# Patient Record
Sex: MALE | Race: WHITE | Employment: OTHER | URBAN - NONMETROPOLITAN AREA
[De-identification: names, ages, dates, MRNs, and addresses within clinical notes are randomized per-mention and may not be internally consistent; named-entity substitution may affect disease eponyms.]

---

## 2021-04-28 ENCOUNTER — TELEPHONE (OUTPATIENT)
Dept: VASCULAR SURGERY | Age: 76
End: 2021-04-28

## 2021-05-10 RX ORDER — SODIUM CHLORIDE FOR INHALATION 10 %
VIAL, NEBULIZER (ML) INHALATION
COMMUNITY
End: 2021-06-09 | Stop reason: CLARIF

## 2021-05-10 RX ORDER — PAROXETINE HYDROCHLORIDE 20 MG/1
20 TABLET, FILM COATED ORAL EVERY MORNING
COMMUNITY

## 2021-05-13 ENCOUNTER — OFFICE VISIT (OUTPATIENT)
Dept: VASCULAR SURGERY | Age: 76
End: 2021-05-13
Payer: MEDICARE

## 2021-05-13 ENCOUNTER — HOSPITAL ENCOUNTER (OUTPATIENT)
Dept: NON INVASIVE DIAGNOSTICS | Age: 76
Discharge: HOME | End: 2021-05-13
Payer: MEDICARE

## 2021-05-13 VITALS
HEART RATE: 108 BPM | SYSTOLIC BLOOD PRESSURE: 82 MMHG | HEIGHT: 67 IN | TEMPERATURE: 98.3 F | BODY MASS INDEX: 21.97 KG/M2 | WEIGHT: 140 LBS | OXYGEN SATURATION: 95 % | DIASTOLIC BLOOD PRESSURE: 54 MMHG | RESPIRATION RATE: 16 BRPM

## 2021-05-13 DIAGNOSIS — I73.9 CLAUDICATION (HCC): ICD-10-CM

## 2021-05-13 DIAGNOSIS — N17.9 ACUTE RENAL FAILURE, UNSPECIFIED ACUTE RENAL FAILURE TYPE (HCC): ICD-10-CM

## 2021-05-13 DIAGNOSIS — I65.23 BILATERAL CAROTID ARTERY STENOSIS: Primary | ICD-10-CM

## 2021-05-13 LAB
ANION GAP SERPL CALCULATED.3IONS-SCNC: 9 MMOL/L (ref 7–19)
BUN BLDV-MCNC: 26 MG/DL (ref 8–23)
CALCIUM SERPL-MCNC: 9.7 MG/DL (ref 8.8–10.2)
CHLORIDE BLD-SCNC: 98 MMOL/L (ref 98–111)
CO2: 28 MMOL/L (ref 22–29)
CREAT SERPL-MCNC: 1.3 MG/DL (ref 0.5–1.2)
GFR AFRICAN AMERICAN: >59
GFR NON-AFRICAN AMERICAN: 54
GLUCOSE BLD-MCNC: 91 MG/DL (ref 74–109)
POTASSIUM SERPL-SCNC: 4.1 MMOL/L (ref 3.5–5)
SODIUM BLD-SCNC: 135 MMOL/L (ref 136–145)

## 2021-05-13 PROCEDURE — 99204 OFFICE O/P NEW MOD 45 MIN: CPT | Performed by: NURSE PRACTITIONER

## 2021-05-13 PROCEDURE — 4004F PT TOBACCO SCREEN RCVD TLK: CPT | Performed by: NURSE PRACTITIONER

## 2021-05-13 PROCEDURE — 93923 UPR/LXTR ART STDY 3+ LVLS: CPT

## 2021-05-13 PROCEDURE — 3017F COLORECTAL CA SCREEN DOC REV: CPT | Performed by: NURSE PRACTITIONER

## 2021-05-13 PROCEDURE — G8420 CALC BMI NORM PARAMETERS: HCPCS | Performed by: NURSE PRACTITIONER

## 2021-05-13 PROCEDURE — 1123F ACP DISCUSS/DSCN MKR DOCD: CPT | Performed by: NURSE PRACTITIONER

## 2021-05-13 PROCEDURE — G8427 DOCREV CUR MEDS BY ELIG CLIN: HCPCS | Performed by: NURSE PRACTITIONER

## 2021-05-13 PROCEDURE — 4040F PNEUMOC VAC/ADMIN/RCVD: CPT | Performed by: NURSE PRACTITIONER

## 2021-05-13 RX ORDER — LISINOPRIL 40 MG/1
40 TABLET ORAL DAILY
COMMUNITY

## 2021-05-13 NOTE — PROGRESS NOTES
Millie Chirinos (:  1945) is a 70 y.o. male,New patient, here for evaluation of the following chief complaint(s):  New Patient [facility]; Carotid stenosis)            SUBJECTIVE/OBJECTIVE:  He presents for follow up of carotid artery stenosis. He was recently hospitalized. He was admitted with multiple episodes of syncope. He had sob and reports his eyes would dim as he was about to pass out. He had no lateralizing events. On admission he was in ARF he reports due to hydration. He states all of that is better but he is still having near syncopal spells. He reports he had cardiac monitoring while there and was sent her due to critical carotid. His current treatment includes none. He denies a history of CVA. He reports has not had TIA's, episodes of lateralizing weakness and episodes of amaurosis fugax. He also reports claudication at a short distance, about 100 foot. Right leg is more significant than the left. He has pain in his right foot at rest on occasion. Millie Chirinos is a 70 y.o. male [facility] the [facility] There are no active problems to display for this patient. Current Outpatient Medications   Medication Sig Dispense Refill    lisinopril (PRINIVIL;ZESTRIL) 40 MG tablet Take 40 mg by mouth daily      HYDROCHLOROTHIAZIDE PO Take by mouth      PARoxetine (PAXIL) 20 MG tablet Take 20 mg by mouth every morning      Meperidine HCl-Sodium Chloride 1000-0.9 MG/100ML-% SOLN Infuse intravenously.  Sodium Chloride 10 % NEBU Inhale into the lungs       No current facility-administered medications for this visit. Allergies:  Other and Sulfa antibiotics  Past Medical History:   Diagnosis Date    [facility]cere[facility] [address] [address]     Depression     Hypertension      Past Surgical History:   Procedure Laterality Date    CARPAL TUNNEL RELEASE Bilateral 2000    KNEE SURGERY       Family History   Problem Relation Age of Onset    function      Reviewed lfts and these are normal        Reviewed on this visit: pcp notes and recent hospitalization notes from last month         ASSESSMENT/PLAN:  1. Bilateral carotid artery stenosis  -     CTA NECK W CONTRAST; Future  -     CTA HEAD W WO CONTRAST; Future  2. Claudication ([address]  -     VL LOWER EXTREMITY ARTERIAL SEGMENTAL PRESSURES W PPG; Future  3. Acute renal failure, unspecified acute renal failure type ([address]  -     Basic Metabolic Panel        Discussed management of carotid u/s which includes:  start asa to reduce risk of TIA/CVA, to reduce risk of arterial thrombosis and to decrease rate of plaque buildup  Strongly encourage start/continue statin therapy - will need fasting lipid  Recommend no smoking - discussed the effect tobacco has on illness;   Proceed with Lower extremity arterial study: Right RON 0.39, Left RON 0.55. Individual films reviewed: Yes. These results were reviewed with the patient. Disease process is acute/chronic illness that poses a threat to life or bodily function    Bmp today - creatinine now 1.3. Discussed with Dr. Anthony Murrieta. He needs cta head and neck. We will prep with mucomyst and hydrate to try and decrease risk to kidneys. Then we can address whether CE is indicated. He will need intervention to his legs, especially right when this is complete         Patient instructed to call or proceed to the emergency room with any symptoms of lateralizing weakness, loss of vision in one eye, or episodes slurred speech. An electronic signature was used to authenticate this note.     --[facility], ROLANDO

## 2021-05-13 NOTE — Clinical Note
Please call patient. His blood flow to his feet is very poor. His labs are greatly improved. Per dr. Dianne Ken. We need to know more about the carotids because he is still passing out. The next step is a ct scan. We have to use dye which must pass through the kidneys. We can prep with mucomyst and hydrate as well as possible to reduce risk of injury to kidneys. We will then see if the carotids need to be cleaned out. We will deal with his legs after this.

## 2021-05-14 ENCOUNTER — TELEPHONE (OUTPATIENT)
Dept: VASCULAR SURGERY | Age: 76
End: 2021-05-14

## 2021-05-14 NOTE — TELEPHONE ENCOUNTER
Spoke with patient him know we have him scheduled for 5/25/21 for CTA with pre and post fluids. He will  mucomyst from [facility] next week and take as instructed. Patient voiced understanding is aware.

## 2021-05-25 ENCOUNTER — HOSPITAL ENCOUNTER (OUTPATIENT)
Dept: CT IMAGING | Age: 76
Discharge: HOME | End: 2021-05-25
Payer: MEDICARE

## 2021-05-25 DIAGNOSIS — I65.23 BILATERAL CAROTID ARTERY STENOSIS: ICD-10-CM

## 2021-05-25 PROCEDURE — 70496 CT ANGIOGRAPHY HEAD: CPT

## 2021-05-25 PROCEDURE — 70498 CT ANGIOGRAPHY NECK: CPT

## 2021-05-25 PROCEDURE — 6360000004 HC RX CONTRAST MEDICATION: Performed by: NURSE PRACTITIONER

## 2021-05-25 PROCEDURE — 2500000003 HC RX 250 WO HCPCS: Performed by: NURSE PRACTITIONER

## 2021-05-25 PROCEDURE — 2580000003 HC RX 258: Performed by: NURSE PRACTITIONER

## 2021-05-25 RX ORDER — SODIUM CHLORIDE 0.9 % (FLUSH) 0.9 %
5-40 SYRINGE (ML) INJECTION EVERY 12 HOURS SCHEDULED
Status: DISCONTINUED | OUTPATIENT
Start: 2021-05-25 | End: 2021-05-27 | Stop reason: HOSPADM

## 2021-05-25 RX ORDER — SODIUM CHLORIDE 0.9 % (FLUSH) 0.9 %
5-40 SYRINGE (ML) INJECTION PRN
Status: DISCONTINUED | OUTPATIENT
Start: 2021-05-25 | End: 2021-05-27 | Stop reason: HOSPADM

## 2021-05-25 RX ORDER — SODIUM CHLORIDE 9 MG/ML
25 INJECTION, SOLUTION INTRAVENOUS PRN
Status: DISCONTINUED | OUTPATIENT
Start: 2021-05-25 | End: 2021-05-27 | Stop reason: HOSPADM

## 2021-05-25 RX ADMIN — SODIUM BICARBONATE: 84 INJECTION, SOLUTION INTRAVENOUS at 13:30

## 2021-05-25 RX ADMIN — SODIUM CHLORIDE, PRESERVATIVE FREE 10 ML: 5 INJECTION INTRAVENOUS at 08:10

## 2021-05-25 RX ADMIN — IOPAMIDOL 90 ML: 755 INJECTION, SOLUTION INTRAVENOUS at 12:55

## 2021-05-25 RX ADMIN — SODIUM BICARBONATE: 84 INJECTION, SOLUTION INTRAVENOUS at 08:40

## 2021-05-25 RX ADMIN — SODIUM CHLORIDE, PRESERVATIVE FREE 10 ML: 5 INJECTION INTRAVENOUS at 08:40

## 2021-05-28 ENCOUNTER — VIRTUAL VISIT (OUTPATIENT)
Dept: VASCULAR SURGERY | Age: 76
End: 2021-05-28
Payer: MEDICARE

## 2021-05-28 DIAGNOSIS — I65.23 BILATERAL CAROTID ARTERY STENOSIS: Primary | ICD-10-CM

## 2021-05-28 PROCEDURE — 99442 PR PHYS/QHP TELEPHONE EVALUATION 11-20 MIN: CPT | Performed by: NURSE PRACTITIONER

## 2021-05-28 RX ORDER — CLOPIDOGREL BISULFATE 75 MG/1
75 TABLET ORAL DAILY
Qty: 30 TABLET | Refills: 3 | Status: ON HOLD | OUTPATIENT
Start: 2021-05-28 | End: 2021-07-30 | Stop reason: ALTCHOICE

## 2021-05-28 RX ORDER — ASPIRIN 81 MG/1
81 TABLET ORAL DAILY
COMMUNITY

## 2021-05-28 NOTE — PROGRESS NOTES
the patient. Disease process is acute/chronic illness that poses a threat to life or bodily function       Options have been discussed with the patient including continued medical management vs. proceeding with right CE. Patient has opted to proceed with this. Risks have been discussed with the patient including but not limited to MI, death, CVA, bleed, nerve injury, and infection. Assessment    1. Bilateral carotid artery stenosis          Plan    1. Bilateral carotid artery stenosis      Proceed with right ce  Offered next week, he prefers to wait  Start plavix 75 m po daily  Will get fasting lipid on arrival  Strongly encouraged start/continue statin therapy  Recommended no smoking        Documentation:  I communicated with the patient and/or health care decision maker about cvd. Details of this discussion including any medical advice provided: as above      I affirm this is a Patient Initiated Episode with a Patient who has not had a related appointment within my department in the past 7 days or scheduled within the next 24 hours. Patient identification was verified at the start of the visit: Yes    Total Time: minutes: 11-20 minutes    The visit was conducted pursuant to the emergency declaration under the [address] [place] [address], [address] [place] [address] authority and the [employer] Act. Patient identification was verified, and a caregiver was present when appropriate. The patient was located in a state where the provider was credentialed to provide care.     Note: not billable if this call serves to triage the patient into an appointment for the relevant concern      ROLANDO Valle

## 2021-06-01 ENCOUNTER — TELEPHONE (OUTPATIENT)
Dept: VASCULAR SURGERY | Age: 76
End: 2021-06-01

## 2021-06-01 DIAGNOSIS — Z01.818 PRE-OP TESTING: Primary | ICD-10-CM

## 2021-06-01 NOTE — TELEPHONE ENCOUNTER
bottle of Hibiclens. Wash thoroughly with this the night before and the morning of the procedure, paying special attention to the area that will be operated on. Make sure you rinse very well. The Hibiclens should only be used prior to surgery. 15. You will need to use Bactroban Ointment prior to your procedure. You will need to apply the Bactroban Ointment to the inside of your nose on both sides twice a day for 5 days starting on Wed. 6/9/2021. The Bactroban Ointment has been sent to [facility]. 16. Please register at the [facility] Patient Registration on Wed. 6/9/2021 at 1:00 pm for pre-op testing. Please make sure to bring your covid vaccination card with you to this appointment. You will not have to fast prior. 16. New policy requires that anyone who comes into the hospital will be required to wear a face mask. A cloth mask is acceptable. 18. To ensure the health and safety of our patients and staff, [facility] has implemented visitor restrictions. Only one person will be allowed to accompany you for your procedure. If you or your visitor are exhibiting signs & symptoms of illness such as fever, cough, sore throat or body aches, we ask that you reschedule your procedure to a later date after your symptoms have been resolved. 19. Other Directions: If you have any questions or concerns please contact our office at [phone] and ask for Lizbet Kaufman. Unless instructed otherwise by your physician, cleanse incision/puncture site twice daily with soap and water. Apply dry gauze. Do not get in tub. Okay to shower. Do not apply any salve, cream, peroxide or alcohol to the incision. Call with any increasing redness or drainage. PLEASE NOTE:  If the patient is unable to sign his/her own paperwork, the appointed caregiver (POA, child, sibling, etc) must be present at the time of registration for all testing and procedures.     Transportation to and from all testing and procedure appointments is the sole responsibility of the patient, caregiver, and/or nursing facility in which they reside. Please remember you will not be able to drive after you are discharged. Please call the office at [phone] with any questions or concerns. Please allow 48-72 hours notice for cancellations or rescheduling. We will attempt to accommodate your needs to the best of our capabilities, however, strict policies with procedure room availability does not allow much flexibility.

## 2021-06-02 RX ORDER — ASPIRIN 81 MG/1
81 TABLET ORAL ONCE
Status: CANCELLED | OUTPATIENT
Start: 2021-06-02 | End: 2021-06-02

## 2021-06-02 RX ORDER — SODIUM CHLORIDE 0.9 % (FLUSH) 0.9 %
10 SYRINGE (ML) INJECTION EVERY 12 HOURS SCHEDULED
Status: CANCELLED | OUTPATIENT
Start: 2021-06-02

## 2021-06-02 RX ORDER — SODIUM CHLORIDE 0.9 % (FLUSH) 0.9 %
10 SYRINGE (ML) INJECTION PRN
Status: CANCELLED | OUTPATIENT
Start: 2021-06-02

## 2021-06-02 RX ORDER — SODIUM CHLORIDE 9 MG/ML
25 INJECTION, SOLUTION INTRAVENOUS PRN
Status: CANCELLED | OUTPATIENT
Start: 2021-06-02

## 2021-06-02 NOTE — H&P (VIEW-ONLY)
Mary Tyler is a 70 y.o. male evaluated via telephone on 2021. Mary Tyler (:  1945) is a 70 y.o. male,Established patient, here for evaluation of the following chief complaint(s):     Consent:  He and/or health care decision maker is aware that that he may receive a bill for this telephone service, depending on his insurance coverage, and has provided verbal consent to proceed: Yes    Patient is located at home  [facility]   Also present during call is no rosa    Patient was recently hospitalized with multiple episodes of syncope. He continued to have spells for 2 weeks after leaving hospital but none since. He gets weak all over when these occur. His vision would dim as he would pass out. He had a complete workup and cardiac monitoring. He was found to have a critical carotid stenosis. He has had no amaurosis or lateralizing weakness. Mary Tyler is a 70 y.o. male with the following history reviewed and recorded in [employer]: There are no problems to display for this patient. Current Outpatient Medications   Medication Sig Dispense Refill    aspirin 81 MG EC tablet Take 81 mg by mouth daily      clopidogrel (PLAVIX) 75 MG tablet Take 1 tablet by mouth daily 30 tablet 3    mupirocin (BACTROBAN) 2 % ointment Apply to nares BID x 5 days starting on 21 3 g 0    lisinopril (PRINIVIL;ZESTRIL) 40 MG tablet Take 40 mg by mouth daily      HYDROCHLOROTHIAZIDE PO Take by mouth      Meperidine HCl-Sodium Chloride 1000-0.9 MG/100ML-% SOLN Infuse intravenously.  PARoxetine (PAXIL) 20 MG tablet Take 20 mg by mouth every morning      Sodium Chloride 10 % NEBU Inhale into the lungs       No current facility-administered medications for this visit. Allergies:  Other and Sulfa antibiotics  Past Medical History:   Diagnosis Date    [facility]cere[facility] [address]     Depression     Hypertension      Past Surgical History:   Procedure Laterality the patient. Disease process is acute/chronic illness that poses a threat to life or bodily function       Options have been discussed with the patient including continued medical management vs. proceeding with right CE. Patient has opted to proceed with this. Risks have been discussed with the patient including but not limited to MI, death, CVA, bleed, nerve injury, and infection. Assessment    1. Bilateral carotid artery stenosis          Plan    1.  Bilateral carotid artery stenosis      Proceed with right ce  Offered next week, he prefers to wait  Start plavix 75 m po daily  Will get fasting lipid on arrival  Strongly encouraged start/continue statin therapy  Recommended no smoking

## 2021-06-02 NOTE — H&P
Johana Lopez is a 70 y.o. male evaluated via telephone on 2021. Johana Lopez (:  1945) is a 70 y.o. male,Established patient, here for evaluation of the following chief complaint(s):     Consent:  He and/or health care decision maker is aware that that he may receive a bill for this telephone service, depending on his insurance coverage, and has provided verbal consent to proceed: Yes    Patient is located at home  Provider is located at [facility]   Also present during call is no rosa    Patient was recently hospitalized with multiple episodes of syncope. He continued to have spells for 2 weeks after leaving hospital but none since. He gets weak all over when these occur. His vision would dim as he would pass out. He had a complete workup and cardiac monitoring. He was found to have a critical carotid stenosis. He has had no amaurosis or lateralizing weakness. Johana Lopez is a 70 y.o. male with the following history reviewed and recorded in [employer]: There are no problems to display for this patient. Current Outpatient Medications   Medication Sig Dispense Refill    aspirin 81 MG EC tablet Take 81 mg by mouth daily      clopidogrel (PLAVIX) 75 MG tablet Take 1 tablet by mouth daily 30 tablet 3    mupirocin (BACTROBAN) 2 % ointment Apply to nares BID x 5 days starting on 21 3 g 0    lisinopril (PRINIVIL;ZESTRIL) 40 MG tablet Take 40 mg by mouth daily      HYDROCHLOROTHIAZIDE PO Take by mouth      Meperidine HCl-Sodium Chloride 1000-0.9 MG/100ML-% SOLN Infuse intravenously.  PARoxetine (PAXIL) 20 MG tablet Take 20 mg by mouth every morning      Sodium Chloride 10 % NEBU Inhale into the lungs       No current facility-administered medications for this visit. Allergies:  Other and Sulfa antibiotics  Past Medical History:   Diagnosis Date    [facility]cere[facility]) [facility] [address]     Depression     Hypertension      Past Surgical History:   Procedure Laterality Date    CARPAL TUNNEL RELEASE Bilateral 2000    KNEE SURGERY  1996     Family History   Problem Relation Age of Onset    Breast Cancer Mother      Social History     Tobacco Use    Smoking status: Current Every Day Smoker     Types: Cigars    Smokeless tobacco: Never Used    Tobacco comment: 1 cigar per day    Substance Use Topics    Alcohol use: Yes     Alcohol/week: 0.0 - 2.0 standard drinks       No flowsheet data found. Review of Systems      Eyes  no sudden vision change or amaurosis. Respiratory  no significant shortness of breath, wheezing, or stridor. No cough,  Cardiovascular  no chest pain, syncope, or significant dizziness. No significant leg swelling.  has not had claudication. Skin   has not had new wound. Neurologic   No speech difficulty or lateralizing weakness. Psychiatric  no severe anxiety or nervousness. No confusion. All other review of systems are negative. PHYSICAL EXAMINATION:    [ INSTRUCTIONS:  \"[x]\" Indicates a positive item  \"[]\" Indicates a negative item  -- DELETE ALL ITEMS NOT EXAMINED]    [x] Alert  [x] Oriented to person/place/time    [x] No apparent distress  [] Toxic appearing  [x] Normal Mood  [] Anxious appearing    [] Depressed appearing  [] Confused appearing      [] Poor short term memory  [] Poor long term memory   Memory appears to be intact       cta -  1.  80% narrowing of the RIGHT internal carotid artery origin and 60%   narrowing of the LEFT internal carotid artery origin secondary to   heavy atherosclerotic plaque and calcification. 2.  Only faint opacification of the LEFT vertebral artery with   appearance of occlusion distally near foramen magnum. 3.  Severe multilevel cervical spine degenerative change     Doppler results:    Right CCA/ICA 70-99% stenotic  Left CCA/ICA 50-69% stenotic  Right verterbral artery flow is antegrade  Left verterbral artery flow is antegrade  Individual velocities reviewed: Yes.   Results were reviewed with

## 2021-06-03 ENCOUNTER — TELEPHONE (OUTPATIENT)
Dept: NEUROSURGERY | Age: 76
End: 2021-06-03

## 2021-06-04 ENCOUNTER — TELEPHONE (OUTPATIENT)
Dept: VASCULAR SURGERY | Age: 76
End: 2021-06-04

## 2021-06-04 DIAGNOSIS — Z01.818 PRE-OP TESTING: Primary | ICD-10-CM

## 2021-06-04 NOTE — TELEPHONE ENCOUNTER
I left a vm letting the pt know he will meet with Dr. Remedios Schwartz the morning of his carotid surgery. I have also offered the pt an ov in Dr. Remedios Schwartz' clinic this coming Argalo Sanchez if he would prefer a sit down. When the pt and I had discussed this previously he did not want the ov scheduled if he would have the chance to meet with him the morning of. I asked for a callback if the pt has changed his mind about the ov.

## 2021-06-07 ENCOUNTER — TELEPHONE (OUTPATIENT)
Dept: NEUROSURGERY | Age: 76
End: 2021-06-07

## 2021-06-07 NOTE — TELEPHONE ENCOUNTER
2nd attempt to call patient to schedule appointment, left voicemail with call back number [phone]

## 2021-06-09 ENCOUNTER — HOSPITAL ENCOUNTER (OUTPATIENT)
Dept: GENERAL RADIOLOGY | Age: 76
Discharge: HOME | End: 2021-06-09
Payer: MEDICARE

## 2021-06-09 ENCOUNTER — HOSPITAL ENCOUNTER (OUTPATIENT)
Dept: PREADMISSION TESTING | Age: 76
Discharge: HOME | End: 2021-06-13
Payer: MEDICARE

## 2021-06-09 VITALS — HEIGHT: 67 IN | BODY MASS INDEX: 22.44 KG/M2 | WEIGHT: 143 LBS

## 2021-06-09 DIAGNOSIS — Z01.818 PRE-OP TESTING: ICD-10-CM

## 2021-06-09 LAB
ABO/RH: NORMAL
ANION GAP SERPL CALCULATED.3IONS-SCNC: 8 MMOL/L (ref 7–19)
ANTIBODY SCREEN: NORMAL
APTT: 27.2 SEC (ref 26–36.2)
BUN BLDV-MCNC: 17 MG/DL (ref 8–23)
CALCIUM SERPL-MCNC: 9.9 MG/DL (ref 8.8–10.2)
CHLORIDE BLD-SCNC: 102 MMOL/L (ref 98–111)
CO2: 29 MMOL/L (ref 22–29)
CREAT SERPL-MCNC: 1.1 MG/DL (ref 0.5–1.2)
GFR AFRICAN AMERICAN: >59
GFR NON-AFRICAN AMERICAN: >60
GLUCOSE BLD-MCNC: 92 MG/DL (ref 74–109)
HCT VFR BLD CALC: 40.7 % (ref 42–52)
HEMOGLOBIN: 14.1 G/DL (ref 14–18)
INR BLD: 0.99 (ref 0.88–1.18)
MCH RBC QN AUTO: 32.5 PG (ref 27–31)
MCHC RBC AUTO-ENTMCNC: 34.6 G/DL (ref 33–37)
MCV RBC AUTO: 93.8 FL (ref 80–94)
MRSA SCREEN RT-PCR: NOT DETECTED
PDW BLD-RTO: 13.4 % (ref 11.5–14.5)
PLATELET # BLD: 376 K/UL (ref 130–400)
PMV BLD AUTO: 9.7 FL (ref 9.4–12.4)
POTASSIUM SERPL-SCNC: 4.5 MMOL/L (ref 3.5–5)
PROTHROMBIN TIME: 13 SEC (ref 12–14.6)
RBC # BLD: 4.34 M/UL (ref 4.7–6.1)
SODIUM BLD-SCNC: 139 MMOL/L (ref 136–145)
WBC # BLD: 7.7 K/UL (ref 4.8–10.8)

## 2021-06-09 PROCEDURE — 80048 BASIC METABOLIC PNL TOTAL CA: CPT

## 2021-06-09 PROCEDURE — 85730 THROMBOPLASTIN TIME PARTIAL: CPT

## 2021-06-09 PROCEDURE — 86900 BLOOD TYPING SEROLOGIC ABO: CPT

## 2021-06-09 PROCEDURE — 93005 ELECTROCARDIOGRAM TRACING: CPT

## 2021-06-09 PROCEDURE — 85027 COMPLETE CBC AUTOMATED: CPT

## 2021-06-09 PROCEDURE — 86850 RBC ANTIBODY SCREEN: CPT

## 2021-06-09 PROCEDURE — 87641 MR-STAPH DNA AMP PROBE: CPT

## 2021-06-09 PROCEDURE — 86901 BLOOD TYPING SEROLOGIC RH(D): CPT

## 2021-06-09 PROCEDURE — 71046 X-RAY EXAM CHEST 2 VIEWS: CPT

## 2021-06-09 PROCEDURE — 85610 PROTHROMBIN TIME: CPT

## 2021-06-10 LAB
EKG P AXIS: 7 DEGREES
EKG P-R INTERVAL: 166 MS
EKG Q-T INTERVAL: 386 MS
EKG QRS DURATION: 82 MS
EKG QTC CALCULATION (BAZETT): 415 MS
EKG T AXIS: 68 DEGREES

## 2021-06-10 PROCEDURE — 93010 ELECTROCARDIOGRAM REPORT: CPT | Performed by: INTERNAL MEDICINE

## 2021-06-15 ENCOUNTER — ANESTHESIA (OUTPATIENT)
Dept: OPERATING ROOM | Age: 76
DRG: 038 | End: 2021-06-15
Payer: MEDICARE

## 2021-06-15 ENCOUNTER — HOSPITAL ENCOUNTER (INPATIENT)
Age: 76
LOS: 2 days | Discharge: HOME | DRG: 038 | End: 2021-06-17
Attending: SURGERY | Admitting: SURGERY
Payer: MEDICARE

## 2021-06-15 ENCOUNTER — APPOINTMENT (OUTPATIENT)
Dept: INTERVENTIONAL RADIOLOGY/VASCULAR | Age: 76
DRG: 038 | End: 2021-06-15
Attending: SURGERY
Payer: MEDICARE

## 2021-06-15 ENCOUNTER — ANESTHESIA EVENT (OUTPATIENT)
Dept: OPERATING ROOM | Age: 76
DRG: 038 | End: 2021-06-15
Payer: MEDICARE

## 2021-06-15 VITALS — DIASTOLIC BLOOD PRESSURE: 83 MMHG | TEMPERATURE: 99.3 F | SYSTOLIC BLOOD PRESSURE: 123 MMHG | OXYGEN SATURATION: 100 %

## 2021-06-15 PROBLEM — I65.21 ASYMPTOMATIC STENOSIS OF RIGHT CAROTID ARTERY: Status: ACTIVE | Noted: 2021-06-15

## 2021-06-15 LAB
ABO/RH: NORMAL
ANTIBODY SCREEN: NORMAL
CHOLESTEROL, TOTAL: 212 MG/DL (ref 160–199)
D DIMER: 1.25 UG/ML FEU (ref 0–0.48)
EKG P AXIS: 67 DEGREES
EKG P-R INTERVAL: 172 MS
EKG Q-T INTERVAL: 332 MS
EKG QRS DURATION: 88 MS
EKG QTC CALCULATION (BAZETT): 407 MS
EKG T AXIS: 122 DEGREES
HDLC SERPL-MCNC: 41 MG/DL (ref 55–121)
LDL CHOLESTEROL CALCULATED: 145 MG/DL
LV EF: 58 %
LVEF MODALITY: NORMAL
POTASSIUM SERPL-SCNC: 4.3 MMOL/L (ref 3.5–5)
TRIGL SERPL-MCNC: 128 MG/DL (ref 0–149)
TROPONIN: <0.01 NG/ML (ref 0–0.03)

## 2021-06-15 PROCEDURE — C1768 GRAFT, VASCULAR: HCPCS | Performed by: SURGERY

## 2021-06-15 PROCEDURE — 03CK0ZZ EXTIRPATION OF MATTER FROM RIGHT INTERNAL CAROTID ARTERY, OPEN APPROACH: ICD-10-PCS | Performed by: SURGERY

## 2021-06-15 PROCEDURE — 2500000003 HC RX 250 WO HCPCS: Performed by: NURSE ANESTHETIST, CERTIFIED REGISTERED

## 2021-06-15 PROCEDURE — 7100000001 HC PACU RECOVERY - ADDTL 15 MIN: Performed by: SURGERY

## 2021-06-15 PROCEDURE — 6360000002 HC RX W HCPCS: Performed by: SURGERY

## 2021-06-15 PROCEDURE — 3600000015 HC SURGERY LEVEL 5 ADDTL 15MIN: Performed by: SURGERY

## 2021-06-15 PROCEDURE — 2709999900 HC NON-CHARGEABLE SUPPLY: Performed by: SURGERY

## 2021-06-15 PROCEDURE — 3700000000 HC ANESTHESIA ATTENDED CARE: Performed by: SURGERY

## 2021-06-15 PROCEDURE — 93010 ELECTROCARDIOGRAM REPORT: CPT | Performed by: INTERNAL MEDICINE

## 2021-06-15 PROCEDURE — 2580000003 HC RX 258: Performed by: ANESTHESIOLOGY

## 2021-06-15 PROCEDURE — 7100000000 HC PACU RECOVERY - FIRST 15 MIN: Performed by: SURGERY

## 2021-06-15 PROCEDURE — 84132 ASSAY OF SERUM POTASSIUM: CPT

## 2021-06-15 PROCEDURE — 86900 BLOOD TYPING SEROLOGIC ABO: CPT

## 2021-06-15 PROCEDURE — 93005 ELECTROCARDIOGRAM TRACING: CPT | Performed by: ANESTHESIOLOGY

## 2021-06-15 PROCEDURE — 93306 TTE W/DOPPLER COMPLETE: CPT

## 2021-06-15 PROCEDURE — 80061 LIPID PANEL: CPT

## 2021-06-15 PROCEDURE — 86901 BLOOD TYPING SEROLOGIC RH(D): CPT

## 2021-06-15 PROCEDURE — 6360000002 HC RX W HCPCS: Performed by: NURSE PRACTITIONER

## 2021-06-15 PROCEDURE — 2580000003 HC RX 258: Performed by: SURGERY

## 2021-06-15 PROCEDURE — 3700000001 HC ADD 15 MINUTES (ANESTHESIA): Performed by: SURGERY

## 2021-06-15 PROCEDURE — 36415 COLL VENOUS BLD VENIPUNCTURE: CPT

## 2021-06-15 PROCEDURE — 84484 ASSAY OF TROPONIN QUANT: CPT

## 2021-06-15 PROCEDURE — 35301 RECHANNELING OF ARTERY: CPT | Performed by: SURGERY

## 2021-06-15 PROCEDURE — 2000000000 HC ICU R&B

## 2021-06-15 PROCEDURE — 6370000000 HC RX 637 (ALT 250 FOR IP): Performed by: NURSE ANESTHETIST, CERTIFIED REGISTERED

## 2021-06-15 PROCEDURE — 86850 RBC ANTIBODY SCREEN: CPT

## 2021-06-15 PROCEDURE — 6360000002 HC RX W HCPCS: Performed by: NURSE ANESTHETIST, CERTIFIED REGISTERED

## 2021-06-15 PROCEDURE — 99223 1ST HOSP IP/OBS HIGH 75: CPT | Performed by: INTERNAL MEDICINE

## 2021-06-15 PROCEDURE — 3600000005 HC SURGERY LEVEL 5 BASE: Performed by: SURGERY

## 2021-06-15 PROCEDURE — 88304 TISSUE EXAM BY PATHOLOGIST: CPT

## 2021-06-15 PROCEDURE — 03UK0KZ SUPPLEMENT RIGHT INTERNAL CAROTID ARTERY WITH NONAUTOLOGOUS TISSUE SUBSTITUTE, OPEN APPROACH: ICD-10-PCS | Performed by: SURGERY

## 2021-06-15 PROCEDURE — 2500000003 HC RX 250 WO HCPCS: Performed by: ANESTHESIOLOGY

## 2021-06-15 PROCEDURE — 6370000000 HC RX 637 (ALT 250 FOR IP): Performed by: SURGERY

## 2021-06-15 PROCEDURE — 88311 DECALCIFY TISSUE: CPT

## 2021-06-15 PROCEDURE — 85379 FIBRIN DEGRADATION QUANT: CPT

## 2021-06-15 DEVICE — PATCH BIOLOGIC VASC 1CM WX14CM L .55MM THICKNESSXENOSURE: Type: IMPLANTABLE DEVICE | Site: CAROTID | Status: FUNCTIONAL

## 2021-06-15 RX ORDER — ROCURONIUM BROMIDE 10 MG/ML
INJECTION, SOLUTION INTRAVENOUS PRN
Status: DISCONTINUED | OUTPATIENT
Start: 2021-06-15 | End: 2021-06-15 | Stop reason: SDUPTHER

## 2021-06-15 RX ORDER — HYDROCHLOROTHIAZIDE 25 MG/1
25 TABLET ORAL DAILY
Status: DISCONTINUED | OUTPATIENT
Start: 2021-06-15 | End: 2021-06-17 | Stop reason: HOSPADM

## 2021-06-15 RX ORDER — SODIUM CHLORIDE 0.9 % (FLUSH) 0.9 %
5-40 SYRINGE (ML) INJECTION EVERY 12 HOURS SCHEDULED
Status: DISCONTINUED | OUTPATIENT
Start: 2021-06-15 | End: 2021-06-17 | Stop reason: HOSPADM

## 2021-06-15 RX ORDER — MEPERIDINE HYDROCHLORIDE 50 MG/ML
12.5 INJECTION INTRAMUSCULAR; INTRAVENOUS; SUBCUTANEOUS EVERY 5 MIN PRN
Status: DISCONTINUED | OUTPATIENT
Start: 2021-06-15 | End: 2021-06-15 | Stop reason: HOSPADM

## 2021-06-15 RX ORDER — DEXAMETHASONE SODIUM PHOSPHATE 10 MG/ML
INJECTION, SOLUTION INTRAMUSCULAR; INTRAVENOUS PRN
Status: DISCONTINUED | OUTPATIENT
Start: 2021-06-15 | End: 2021-06-15 | Stop reason: SDUPTHER

## 2021-06-15 RX ORDER — SODIUM CHLORIDE 0.9 % (FLUSH) 0.9 %
10 SYRINGE (ML) INJECTION PRN
Status: DISCONTINUED | OUTPATIENT
Start: 2021-06-15 | End: 2021-06-15 | Stop reason: HOSPADM

## 2021-06-15 RX ORDER — LIDOCAINE HYDROCHLORIDE 10 MG/ML
1 INJECTION, SOLUTION EPIDURAL; INFILTRATION; INTRACAUDAL; PERINEURAL
Status: DISCONTINUED | OUTPATIENT
Start: 2021-06-15 | End: 2021-06-15 | Stop reason: HOSPADM

## 2021-06-15 RX ORDER — ASPIRIN 81 MG/1
81 TABLET ORAL DAILY
Status: DISCONTINUED | OUTPATIENT
Start: 2021-06-16 | End: 2021-06-17 | Stop reason: HOSPADM

## 2021-06-15 RX ORDER — SODIUM CHLORIDE 0.9 % (FLUSH) 0.9 %
5-40 SYRINGE (ML) INJECTION PRN
Status: DISCONTINUED | OUTPATIENT
Start: 2021-06-15 | End: 2021-06-15 | Stop reason: HOSPADM

## 2021-06-15 RX ORDER — ONDANSETRON 4 MG/1
4 TABLET, ORALLY DISINTEGRATING ORAL EVERY 8 HOURS PRN
Status: DISCONTINUED | OUTPATIENT
Start: 2021-06-15 | End: 2021-06-16

## 2021-06-15 RX ORDER — HYDROMORPHONE HYDROCHLORIDE 1 MG/ML
0.25 INJECTION, SOLUTION INTRAMUSCULAR; INTRAVENOUS; SUBCUTANEOUS
Status: DISCONTINUED | OUTPATIENT
Start: 2021-06-15 | End: 2021-06-16

## 2021-06-15 RX ORDER — METOCLOPRAMIDE HYDROCHLORIDE 5 MG/ML
10 INJECTION INTRAMUSCULAR; INTRAVENOUS
Status: DISCONTINUED | OUTPATIENT
Start: 2021-06-15 | End: 2021-06-15 | Stop reason: HOSPADM

## 2021-06-15 RX ORDER — SODIUM CHLORIDE, SODIUM LACTATE, POTASSIUM CHLORIDE, CALCIUM CHLORIDE 600; 310; 30; 20 MG/100ML; MG/100ML; MG/100ML; MG/100ML
INJECTION, SOLUTION INTRAVENOUS CONTINUOUS
Status: DISCONTINUED | OUTPATIENT
Start: 2021-06-15 | End: 2021-06-15

## 2021-06-15 RX ORDER — HYDROMORPHONE HYDROCHLORIDE 1 MG/ML
0.25 INJECTION, SOLUTION INTRAMUSCULAR; INTRAVENOUS; SUBCUTANEOUS EVERY 5 MIN PRN
Status: DISCONTINUED | OUTPATIENT
Start: 2021-06-15 | End: 2021-06-15 | Stop reason: HOSPADM

## 2021-06-15 RX ORDER — ONDANSETRON 2 MG/ML
INJECTION INTRAMUSCULAR; INTRAVENOUS PRN
Status: DISCONTINUED | OUTPATIENT
Start: 2021-06-15 | End: 2021-06-15 | Stop reason: SDUPTHER

## 2021-06-15 RX ORDER — MIDAZOLAM HYDROCHLORIDE 1 MG/ML
2 INJECTION INTRAMUSCULAR; INTRAVENOUS
Status: DISCONTINUED | OUTPATIENT
Start: 2021-06-15 | End: 2021-06-15 | Stop reason: HOSPADM

## 2021-06-15 RX ORDER — CLOPIDOGREL BISULFATE 75 MG/1
75 TABLET ORAL DAILY
Status: DISCONTINUED | OUTPATIENT
Start: 2021-06-15 | End: 2021-06-17 | Stop reason: HOSPADM

## 2021-06-15 RX ORDER — EPHEDRINE SULFATE 50 MG/ML
INJECTION, SOLUTION INTRAVENOUS PRN
Status: DISCONTINUED | OUTPATIENT
Start: 2021-06-15 | End: 2021-06-15 | Stop reason: SDUPTHER

## 2021-06-15 RX ORDER — SODIUM CHLORIDE 9 MG/ML
25 INJECTION, SOLUTION INTRAVENOUS PRN
Status: DISCONTINUED | OUTPATIENT
Start: 2021-06-15 | End: 2021-06-15 | Stop reason: HOSPADM

## 2021-06-15 RX ORDER — DIPHENHYDRAMINE HYDROCHLORIDE 50 MG/ML
12.5 INJECTION INTRAMUSCULAR; INTRAVENOUS
Status: DISCONTINUED | OUTPATIENT
Start: 2021-06-15 | End: 2021-06-15 | Stop reason: HOSPADM

## 2021-06-15 RX ORDER — OXYCODONE HYDROCHLORIDE 5 MG/1
5 TABLET ORAL EVERY 4 HOURS PRN
Status: DISCONTINUED | OUTPATIENT
Start: 2021-06-15 | End: 2021-06-17 | Stop reason: HOSPADM

## 2021-06-15 RX ORDER — HYDRALAZINE HYDROCHLORIDE 20 MG/ML
5 INJECTION INTRAMUSCULAR; INTRAVENOUS EVERY 10 MIN PRN
Status: DISCONTINUED | OUTPATIENT
Start: 2021-06-15 | End: 2021-06-15 | Stop reason: HOSPADM

## 2021-06-15 RX ORDER — PAROXETINE HYDROCHLORIDE 20 MG/1
20 TABLET, FILM COATED ORAL EVERY MORNING
Status: DISCONTINUED | OUTPATIENT
Start: 2021-06-16 | End: 2021-06-17 | Stop reason: HOSPADM

## 2021-06-15 RX ORDER — OXYCODONE HYDROCHLORIDE 5 MG/1
10 TABLET ORAL EVERY 4 HOURS PRN
Status: DISCONTINUED | OUTPATIENT
Start: 2021-06-15 | End: 2021-06-17 | Stop reason: HOSPADM

## 2021-06-15 RX ORDER — SODIUM CHLORIDE 9 MG/ML
INJECTION, SOLUTION INTRAVENOUS CONTINUOUS
Status: ACTIVE | OUTPATIENT
Start: 2021-06-15 | End: 2021-06-15

## 2021-06-15 RX ORDER — ONDANSETRON 2 MG/ML
4 INJECTION INTRAMUSCULAR; INTRAVENOUS EVERY 6 HOURS PRN
Status: DISCONTINUED | OUTPATIENT
Start: 2021-06-15 | End: 2021-06-17 | Stop reason: HOSPADM

## 2021-06-15 RX ORDER — ENALAPRILAT 2.5 MG/2ML
1.25 INJECTION INTRAVENOUS
Status: DISCONTINUED | OUTPATIENT
Start: 2021-06-15 | End: 2021-06-15 | Stop reason: HOSPADM

## 2021-06-15 RX ORDER — MORPHINE SULFATE 4 MG/ML
2 INJECTION, SOLUTION INTRAMUSCULAR; INTRAVENOUS EVERY 5 MIN PRN
Status: DISCONTINUED | OUTPATIENT
Start: 2021-06-15 | End: 2021-06-15 | Stop reason: HOSPADM

## 2021-06-15 RX ORDER — PROMETHAZINE HYDROCHLORIDE 25 MG/ML
6.25 INJECTION, SOLUTION INTRAMUSCULAR; INTRAVENOUS
Status: DISCONTINUED | OUTPATIENT
Start: 2021-06-15 | End: 2021-06-15 | Stop reason: HOSPADM

## 2021-06-15 RX ORDER — DILTIAZEM HYDROCHLORIDE 5 MG/ML
5 INJECTION INTRAVENOUS PRN
Status: DISCONTINUED | OUTPATIENT
Start: 2021-06-15 | End: 2021-06-15 | Stop reason: HOSPADM

## 2021-06-15 RX ORDER — PROPOFOL 10 MG/ML
INJECTION, EMULSION INTRAVENOUS PRN
Status: DISCONTINUED | OUTPATIENT
Start: 2021-06-15 | End: 2021-06-15 | Stop reason: SDUPTHER

## 2021-06-15 RX ORDER — SODIUM CHLORIDE 9 MG/ML
INJECTION, SOLUTION INTRAVENOUS CONTINUOUS
Status: DISCONTINUED | OUTPATIENT
Start: 2021-06-15 | End: 2021-06-15

## 2021-06-15 RX ORDER — CLONIDINE HYDROCHLORIDE 0.1 MG/1
0.1 TABLET ORAL
Status: DISCONTINUED | OUTPATIENT
Start: 2021-06-15 | End: 2021-06-17 | Stop reason: HOSPADM

## 2021-06-15 RX ORDER — HYDROMORPHONE HYDROCHLORIDE 1 MG/ML
0.5 INJECTION, SOLUTION INTRAMUSCULAR; INTRAVENOUS; SUBCUTANEOUS
Status: DISCONTINUED | OUTPATIENT
Start: 2021-06-15 | End: 2021-06-16

## 2021-06-15 RX ORDER — HEPARIN SODIUM 1000 [USP'U]/ML
INJECTION, SOLUTION INTRAVENOUS; SUBCUTANEOUS PRN
Status: DISCONTINUED | OUTPATIENT
Start: 2021-06-15 | End: 2021-06-15 | Stop reason: SDUPTHER

## 2021-06-15 RX ORDER — SODIUM CHLORIDE 0.9 % (FLUSH) 0.9 %
5-40 SYRINGE (ML) INJECTION PRN
Status: DISCONTINUED | OUTPATIENT
Start: 2021-06-15 | End: 2021-06-17 | Stop reason: HOSPADM

## 2021-06-15 RX ORDER — SODIUM CHLORIDE 0.9 % (FLUSH) 0.9 %
10 SYRINGE (ML) INJECTION EVERY 12 HOURS SCHEDULED
Status: DISCONTINUED | OUTPATIENT
Start: 2021-06-15 | End: 2021-06-15 | Stop reason: HOSPADM

## 2021-06-15 RX ORDER — FENTANYL CITRATE 50 UG/ML
INJECTION, SOLUTION INTRAMUSCULAR; INTRAVENOUS PRN
Status: DISCONTINUED | OUTPATIENT
Start: 2021-06-15 | End: 2021-06-15 | Stop reason: SDUPTHER

## 2021-06-15 RX ORDER — HYDROMORPHONE HYDROCHLORIDE 1 MG/ML
0.5 INJECTION, SOLUTION INTRAMUSCULAR; INTRAVENOUS; SUBCUTANEOUS EVERY 5 MIN PRN
Status: DISCONTINUED | OUTPATIENT
Start: 2021-06-15 | End: 2021-06-15 | Stop reason: HOSPADM

## 2021-06-15 RX ORDER — LABETALOL HYDROCHLORIDE 5 MG/ML
5 INJECTION, SOLUTION INTRAVENOUS EVERY 10 MIN PRN
Status: DISCONTINUED | OUTPATIENT
Start: 2021-06-15 | End: 2021-06-15 | Stop reason: HOSPADM

## 2021-06-15 RX ORDER — SODIUM CHLORIDE 9 MG/ML
25 INJECTION, SOLUTION INTRAVENOUS PRN
Status: DISCONTINUED | OUTPATIENT
Start: 2021-06-15 | End: 2021-06-16

## 2021-06-15 RX ORDER — LIDOCAINE HYDROCHLORIDE 10 MG/ML
INJECTION, SOLUTION EPIDURAL; INFILTRATION; INTRACAUDAL; PERINEURAL PRN
Status: DISCONTINUED | OUTPATIENT
Start: 2021-06-15 | End: 2021-06-15 | Stop reason: SDUPTHER

## 2021-06-15 RX ORDER — LIDOCAINE HYDROCHLORIDE 40 MG/ML
SOLUTION TOPICAL PRN
Status: DISCONTINUED | OUTPATIENT
Start: 2021-06-15 | End: 2021-06-15 | Stop reason: SDUPTHER

## 2021-06-15 RX ORDER — FENTANYL CITRATE 50 UG/ML
50 INJECTION, SOLUTION INTRAMUSCULAR; INTRAVENOUS
Status: DISCONTINUED | OUTPATIENT
Start: 2021-06-15 | End: 2021-06-15 | Stop reason: HOSPADM

## 2021-06-15 RX ORDER — MORPHINE SULFATE 4 MG/ML
4 INJECTION, SOLUTION INTRAMUSCULAR; INTRAVENOUS EVERY 5 MIN PRN
Status: DISCONTINUED | OUTPATIENT
Start: 2021-06-15 | End: 2021-06-15 | Stop reason: HOSPADM

## 2021-06-15 RX ORDER — HYDRALAZINE HYDROCHLORIDE 20 MG/ML
10 INJECTION INTRAMUSCULAR; INTRAVENOUS
Status: DISCONTINUED | OUTPATIENT
Start: 2021-06-15 | End: 2021-06-17 | Stop reason: HOSPADM

## 2021-06-15 RX ORDER — RIFAMPIN 600 MG/10ML
INJECTION, POWDER, LYOPHILIZED, FOR SOLUTION INTRAVENOUS PRN
Status: DISCONTINUED | OUTPATIENT
Start: 2021-06-15 | End: 2021-06-15 | Stop reason: HOSPADM

## 2021-06-15 RX ORDER — ASPIRIN 81 MG/1
81 TABLET ORAL ONCE
Status: DISCONTINUED | OUTPATIENT
Start: 2021-06-15 | End: 2021-06-15 | Stop reason: HOSPADM

## 2021-06-15 RX ORDER — LISINOPRIL 20 MG/1
40 TABLET ORAL DAILY
Status: DISCONTINUED | OUTPATIENT
Start: 2021-06-15 | End: 2021-06-17 | Stop reason: HOSPADM

## 2021-06-15 RX ORDER — FENTANYL CITRATE 50 UG/ML
25 INJECTION, SOLUTION INTRAMUSCULAR; INTRAVENOUS
Status: DISCONTINUED | OUTPATIENT
Start: 2021-06-15 | End: 2021-06-15 | Stop reason: HOSPADM

## 2021-06-15 RX ORDER — PROTAMINE SULFATE 10 MG/ML
INJECTION, SOLUTION INTRAVENOUS PRN
Status: DISCONTINUED | OUTPATIENT
Start: 2021-06-15 | End: 2021-06-15 | Stop reason: SDUPTHER

## 2021-06-15 RX ORDER — SODIUM CHLORIDE 0.9 % (FLUSH) 0.9 %
5-40 SYRINGE (ML) INJECTION EVERY 12 HOURS SCHEDULED
Status: DISCONTINUED | OUTPATIENT
Start: 2021-06-15 | End: 2021-06-15 | Stop reason: HOSPADM

## 2021-06-15 RX ORDER — FENTANYL CITRATE 50 UG/ML
INJECTION, SOLUTION INTRAMUSCULAR; INTRAVENOUS
Status: DISCONTINUED
Start: 2021-06-15 | End: 2021-06-15 | Stop reason: WASHOUT

## 2021-06-15 RX ADMIN — FENTANYL CITRATE 50 MCG: 50 INJECTION, SOLUTION INTRAMUSCULAR; INTRAVENOUS at 10:07

## 2021-06-15 RX ADMIN — FENTANYL CITRATE 50 MCG: 50 INJECTION, SOLUTION INTRAMUSCULAR; INTRAVENOUS at 07:55

## 2021-06-15 RX ADMIN — CEFAZOLIN SODIUM 2000 MG: 10 INJECTION, POWDER, FOR SOLUTION INTRAVENOUS at 23:10

## 2021-06-15 RX ADMIN — HEPARIN SODIUM 6000 UNITS: 1000 INJECTION, SOLUTION INTRAVENOUS; SUBCUTANEOUS at 08:26

## 2021-06-15 RX ADMIN — SUGAMMADEX 300 MG: 100 INJECTION, SOLUTION INTRAVENOUS at 09:47

## 2021-06-15 RX ADMIN — EPHEDRINE SULFATE 10 MG: 50 INJECTION INTRAMUSCULAR; INTRAVENOUS; SUBCUTANEOUS at 07:41

## 2021-06-15 RX ADMIN — PROTAMINE SULFATE 30 MG: 10 INJECTION, SOLUTION INTRAVENOUS at 09:40

## 2021-06-15 RX ADMIN — Medication 10 ML: at 20:19

## 2021-06-15 RX ADMIN — LIDOCAINE HYDROCHLORIDE 4 ML: 40 SOLUTION TOPICAL at 07:36

## 2021-06-15 RX ADMIN — PROPOFOL 150 MG: 10 INJECTION, EMULSION INTRAVENOUS at 07:34

## 2021-06-15 RX ADMIN — ROCURONIUM BROMIDE 50 MG: 10 INJECTION, SOLUTION INTRAVENOUS at 07:34

## 2021-06-15 RX ADMIN — SODIUM CHLORIDE, SODIUM LACTATE, POTASSIUM CHLORIDE, AND CALCIUM CHLORIDE: 600; 310; 30; 20 INJECTION, SOLUTION INTRAVENOUS at 09:03

## 2021-06-15 RX ADMIN — CEFAZOLIN SODIUM 2000 MG: 10 INJECTION, POWDER, FOR SOLUTION INTRAVENOUS at 16:26

## 2021-06-15 RX ADMIN — FENTANYL CITRATE 50 MCG: 50 INJECTION, SOLUTION INTRAMUSCULAR; INTRAVENOUS at 08:00

## 2021-06-15 RX ADMIN — ONDANSETRON HYDROCHLORIDE 4 MG: 2 INJECTION, SOLUTION INTRAMUSCULAR; INTRAVENOUS at 09:45

## 2021-06-15 RX ADMIN — LISINOPRIL 40 MG: 20 TABLET ORAL at 12:20

## 2021-06-15 RX ADMIN — LIDOCAINE HYDROCHLORIDE 50 MG: 10 INJECTION, SOLUTION EPIDURAL; INFILTRATION; INTRACAUDAL; PERINEURAL at 07:34

## 2021-06-15 RX ADMIN — DILTIAZEM HYDROCHLORIDE 5 MG: 5 INJECTION, SOLUTION INTRAVENOUS at 10:42

## 2021-06-15 RX ADMIN — CLOPIDOGREL BISULFATE 75 MG: 75 TABLET ORAL at 12:20

## 2021-06-15 RX ADMIN — HYDROCHLOROTHIAZIDE 25 MG: 25 TABLET ORAL at 12:20

## 2021-06-15 RX ADMIN — SODIUM CHLORIDE: 9 INJECTION, SOLUTION INTRAVENOUS at 12:14

## 2021-06-15 RX ADMIN — SODIUM CHLORIDE, SODIUM LACTATE, POTASSIUM CHLORIDE, AND CALCIUM CHLORIDE: 600; 310; 30; 20 INJECTION, SOLUTION INTRAVENOUS at 06:10

## 2021-06-15 RX ADMIN — SODIUM CHLORIDE 25 ML: 9 INJECTION, SOLUTION INTRAVENOUS at 23:09

## 2021-06-15 RX ADMIN — DEXAMETHASONE SODIUM PHOSPHATE 10 MG: 10 INJECTION, SOLUTION INTRAMUSCULAR; INTRAVENOUS at 07:49

## 2021-06-15 RX ADMIN — ROCURONIUM BROMIDE 20 MG: 10 INJECTION, SOLUTION INTRAVENOUS at 08:04

## 2021-06-15 RX ADMIN — EPHEDRINE SULFATE 20 MG: 50 INJECTION INTRAMUSCULAR; INTRAVENOUS; SUBCUTANEOUS at 07:39

## 2021-06-15 RX ADMIN — Medication 2000 MG: at 07:30

## 2021-06-15 ASSESSMENT — PAIN SCALES - GENERAL
PAINLEVEL_OUTOF10: 0

## 2021-06-15 ASSESSMENT — ENCOUNTER SYMPTOMS
EYES NEGATIVE: 1
SHORTNESS OF BREATH: 0
DIARRHEA: 0
GASTROINTESTINAL NEGATIVE: 1
NAUSEA: 0
RESPIRATORY NEGATIVE: 1
VOMITING: 0
SHORTNESS OF BREATH: 0

## 2021-06-15 ASSESSMENT — LIFESTYLE VARIABLES: SMOKING_STATUS: 1

## 2021-06-15 NOTE — ANESTHESIA PROCEDURE NOTES
Arterial Line:    An arterial line was placed using ultrasound guidance and surface landmarks, in the pre-op for the following indication(s): continuous blood pressure monitoring and blood sampling needed. A 22 gauge (size), 1 and 3/4 inch (length), Arrow (type) catheter was placed, Seldinger technique used, into the right radial artery and a [employer] Arterial Cannulation Support device was used for positioning, secured by tape and Tegaderm. Anesthesia type: Local  Local infiltration: None    Events:  patient tolerated procedure well with no complications and EBL 0mL. Additional notes:  Line placed by CRNA student.   6/15/2021 7:24 AM6/15/2021 7:24 AM  Anesthesiologist: Clinton Thibodeaux DO  Performed: Anesthesiologist and Other anesthesia staff   Preanesthetic Checklist  Completed: patient identified, IV checked, site marked, risks and benefits discussed, surgical consent, monitors and equipment checked, pre-op evaluation, timeout performed, anesthesia consent given, oxygen available and patient being monitored

## 2021-06-15 NOTE — ANESTHESIA PRE PROCEDURE
Department of Anesthesiology  Preprocedure Note       Name:  Jorden Ormond   Age:  70 y.o.  :  1945                                          MRN:  [id number]         Date:  6/15/2021      Surgeon: José Hicks):  Patti Burns MD    Procedure: Procedure(s):  RIGHT CAROTID ENDARTERECTOMY    Medications prior to admission:   Prior to Admission medications    Medication Sig Start Date End Date Taking? Authorizing Provider   aspirin 81 MG EC tablet Take 81 mg by mouth daily   Yes Historical Provider, MD   clopidogrel (PLAVIX) 75 MG tablet Take 1 tablet by mouth daily 21  Yes Ne Flow, APRN   lisinopril (PRINIVIL;ZESTRIL) 40 MG tablet Take 40 mg by mouth daily   Yes Historical Provider, MD   HYDROCHLOROTHIAZIDE PO Take 25 mg by mouth daily    Yes Historical Provider, MD   PARoxetine (PAXIL) 20 MG tablet Take 20 mg by mouth every morning   Yes Historical Provider, MD       Current medications:    Current Facility-Administered Medications   Medication Dose Route Frequency Provider Last Rate Last Admin    0.9 % sodium chloride infusion  25 mL Intravenous PRN Ne Flow, APRN        ceFAZolin (ANCEF) 2000 mg in 0.9% sodium chloride 50 mL IVPB  2,000 mg Intravenous On Call to [facility], APRN        sodium chloride flush 0.9 % injection 10 mL  10 mL Intravenous 2 times per day Ne Flow, APRN        sodium chloride flush 0.9 % injection 10 mL  10 mL Intravenous PRN Ne Flow, APRN        aspirin EC tablet 81 mg  81 mg Oral Once Ne Flow, APRN        lactated ringers infusion   Intravenous Continuous Jac Loft,  mL/hr at 06/15/21 0610 New Bag at 06/15/21 0610       Allergies: Allergies   Allergen Reactions    Other Hives     Red Meat; hives and throat swelling    Sulfa Antibiotics Other (See Comments)     Childhood allergy       Problem List:  There is no problem list on file for this patient.       Past Medical History:        Diagnosis Date    Allergy to alpha-gal     started in the 90's    Carotid arterial disease ([facility] [address]     CVA (cerebral vascular accident) ([facility] [address]     loss of balance    Depression     Hypertension        Past Surgical History:        Procedure Laterality Date    CARPAL TUNNEL RELEASE Bilateral 2000    KNEE SURGERY  1996       Social History:    Social History     Tobacco Use    Smoking status: Current Every Day Smoker     Types: Cigars    Smokeless tobacco: Never Used    Tobacco comment: 1 cigar per day    Substance Use Topics    Alcohol use: Yes     Alcohol/week: 12.0 standard drinks     Types: 12 Cans of beer per week                                Ready to quit: Not Answered  Counseling given: Not Answered  Comment: 1 cigar per day       Vital Signs (Current):   Vitals:    06/15/21 0546   BP: (!) 157/86   Pulse: 92   Resp: 16   Temp: 97.2 °F (36.2 °C)   TempSrc: Temporal   SpO2: 100%   Weight: 140 lb (63.5 kg)   Height: 5' 7\" (1.702 m)                                              BP Readings from Last 3 Encounters:   06/15/21 (!) 157/86   05/13/21 (!) 82/54       NPO Status: Time of last liquid consumption: 2300                        Time of last solid consumption: 2300                        Date of last liquid consumption: 06/14/21                        Date of last solid food consumption: 06/14/21    BMI:   Wt Readings from Last 3 Encounters:   06/15/21 140 lb (63.5 kg)   06/09/21 143 lb (64.9 kg)   05/13/21 140 lb (63.5 kg)     Body mass index is 21.93 kg/m².     CBC:   Lab Results   Component Value Date    WBC 7.7 06/09/2021    RBC 4.34 06/09/2021    HGB 14.1 06/09/2021    HCT 40.7 06/09/2021    MCV 93.8 06/09/2021    RDW 13.4 06/09/2021     06/09/2021       CMP:   Lab Results   Component Value Date     06/09/2021    K 4.5 06/09/2021     06/09/2021    CO2 29 06/09/2021    BUN 17 06/09/2021    CREATININE 1.1 06/09/2021    GFRAA >59 06/09/2021    LABGLOM >60 06/09/2021    GLUCOSE 92 06/09/2021    CALCIUM 9.9 06/09/2021       POC Tests: No results for input(s): POCGLU, POCNA, POCK, POCCL, POCBUN, POCHEMO, POCHCT in the last 72 hours. Coags:   Lab Results   Component Value Date    PROTIME 13.0 06/09/2021    INR 0.99 06/09/2021    APTT 27.2 06/09/2021       HCG (If Applicable): No results found for: PREGTESTUR, PREGSERUM, HCG, HCGQUANT     ABGs: No results found for: PHART, PO2ART, UVF4NKZ, IFC9HAN, BEART, R6UKDMTX     Type & Screen (If Applicable):  No results found for: LABABO, LABRH    Drug/Infectious Status (If Applicable):  No results found for: HIV, HEPCAB    COVID-19 Screening (If Applicable): No results found for: COVID19        Anesthesia Evaluation  Patient summary reviewed and Nursing notes reviewed  Airway: Mallampati: I  TM distance: >3 FB   Neck ROM: full  Mouth opening: > = 3 FB Dental:    (+) edentulous      Pulmonary:   (+) current smoker    (-) asthma, shortness of breath and sleep apnea          Patient smoked on day of surgery. ROS comment: CXR:  Impression  1. No acute lung disease.    Cardiovascular:  Exercise tolerance: good (>4 METS),   (+) hypertension:, hyperlipidemia    (-) pacemaker, past MI, CAD, CABG/stent, dysrhythmias and  angina    ECG reviewed               Beta Blocker:  Not on Beta Blocker      ROS comment: EKG;  77 BPM  Sinus rhythm  Lateral ST-T abnormality is nonspecific  Comparison Summary: No serial comparison made  Summary: Borderline ECG     Neuro/Psych:   (+) CVA: no interval change, psychiatric history:   (-) seizures           GI/Hepatic/Renal:        (-) GERD, liver disease and no renal disease       Endo/Other:    (+) blood dyscrasia (plavix use)::., .    (-) diabetes mellitus, no electrolyte abnormalities               Abdominal:           Vascular:   + PVD, aortic or cerebral, .  - DVT (? DVT in both legs?) and PE.       ROS comment: Impression:  1.  80% narrowing of the RIGHT internal carotid artery origin and 60%  narrowing of the LEFT internal carotid artery origin secondary to  heavy atherosclerotic plaque and calcification. 2.  Only faint opacification of the LEFT vertebral artery with  appearance of occlusion distally near foramen magnum. 3.  Severe multilevel cervical spine degenerative change. Signed by Dr Varsha Dillard on 5/25/2021 3:28 PM    .                               Anesthesia Plan      general     ASA 3       Induction: intravenous. arterial line  MIPS: Postoperative opioids intended and Prophylactic antiemetics administered. Anesthetic plan and risks discussed with patient. Use of blood products discussed with patient whom consented to blood products.                    Collette Blander, DO   6/15/2021

## 2021-06-15 NOTE — ANESTHESIA POSTPROCEDURE EVALUATION
Department of Anesthesiology  Postprocedure Note    Patient: Tigre Olvera  MRN: [id number]  YOB: 1945  Date of evaluation: 6/15/2021  Time:  10:18 AM     Procedure Summary     Date: 06/15/21 Room / Location: [address]    Anesthesia Start: 8030 Anesthesia Stop:     Procedure: RIGHT CAROTID ENDARTERECTOMY WITH COMPLETION ANGIOGRAM (Right ) Diagnosis: (W64.77)    Surgeons: Muna Cortez MD Responsible Provider: ROLANDO Frank CRNA    Anesthesia Type: general ASA Status: 3          Anesthesia Type: No value filed. Claudia Phase I: Claudia Score: 10    Claudia Phase II:      Last vitals: Reviewed and per EMR flowsheets.        Anesthesia Post Evaluation    Patient location during evaluation: PACU  Patient participation: complete - patient participated  Level of consciousness: awake  Pain score: 0  Airway patency: patent  Nausea & Vomiting: no nausea and no vomiting  Complications: no  Cardiovascular status: hemodynamically stable  Respiratory status: acceptable, nasal cannula and spontaneous ventilation  Hydration status: euvolemic

## 2021-06-15 NOTE — CONSULTS
Past Social History:  Social History     Socioeconomic History    Marital status:      Spouse name: Not on file    Number of children: Not on file    Years of education: Not on file    Highest education level: Not on file   Occupational History    Not on file   Tobacco Use    Smoking status: Current Every Day Smoker     Types: Cigars    Smokeless tobacco: Never Used    Tobacco comment: 1 cigar per day    Vaping Use    Vaping Use: Never used   Substance and Sexual Activity    Alcohol use: Yes     Alcohol/week: 12.0 standard drinks     Types: 12 Cans of beer per week    Drug use: Yes     Types: Marijuana     Comment: very rare    Sexual activity: Not on file   Other Topics Concern    Not on file   Social History Narrative    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 once divorcedHe has 1 child a daughterNever in the [facility] and riding motorcycles remains activeSmokes 1 to 2 cigars a day quit smoking cigarettes 15 to 20 years ago     Social Determinants of Health     Financial Resource Strain:     Difficulty of Paying Living Expenses:    Food Insecurity:     Worried About Running Out of Food in the Last Year:     Ran Out of Food in the Last Year:    Transportation Needs:     Lack of Transportation (Medical):  Lack of Transportation (Non-Medical):    Physical Activity:     Days of Exercise per Week:     Minutes of Exercise per Session:    Stress:     Feeling of Stress :    Social Connections:     Frequency of Communication with Friends and Family:     Frequency of Social Gatherings with Friends and Family:     Attends Gnosticist Services:     Active Member of Clubs or Organizations:     Attends Club or Organization Meetings:     Marital Status:    Intimate Partner Violence:     Fear of Current or Ex-Partner:     Emotionally Abused:     Physically Abused:     Sexually Abused: Allergies:   Allergies   Allergen Reactions    Other Hives     Red Meat; hives and throat swelling    Sulfa Antibiotics Other (See Comments)     Childhood allergy       Home Meds:  Prior to Admission medications    Medication Sig Start Date End Date Taking? Authorizing Provider   aspirin 81 MG EC tablet Take 81 mg by mouth daily   Yes Historical Provider, MD   clopidogrel (PLAVIX) 75 MG tablet Take 1 tablet by mouth daily 5/28/21  Yes ROLANDO Matthew   lisinopril (PRINIVIL;ZESTRIL) 40 MG tablet Take 40 mg by mouth daily   Yes Historical Provider, MD   HYDROCHLOROTHIAZIDE PO Take 25 mg by mouth daily    Yes Historical Provider, MD   PARoxetine (PAXIL) 20 MG tablet Take 20 mg by mouth every morning   Yes Historical Provider, MD       Current Meds:   [START ON 6/16/2021] aspirin  81 mg Oral Daily    clopidogrel  75 mg Oral Daily    hydroCHLOROthiazide  25 mg Oral Daily    lisinopril  40 mg Oral Daily    [START ON 6/16/2021] PARoxetine  20 mg Oral QAM    sodium chloride flush  5-40 mL Intravenous 2 times per day    ceFAZolin  2,000 mg Intravenous Q8H       Current Infused Meds:   sodium chloride 100 mL/hr at 06/15/21 1214    sodium chloride         Physical Exam:  Vitals:    06/15/21 1207   BP:    Pulse: 98   Resp: 22   Temp: 97.6 °F (36.4 °C)   SpO2: 92%       Intake/Output Summary (Last 24 hours) at 6/15/2021 1342  Last data filed at 6/15/2021 1207  Gross per 24 hour   Intake 1200 ml   Output 475 ml   Net 725 ml     Estimated body mass index is 21.93 kg/m² as calculated from the following:    Height as of this encounter: 5' 7\" (1.702 m). Weight as of this encounter: 140 lb (63.5 kg). Physical Exam  Vitals reviewed. Constitutional:       General: He is not in acute distress. Appearance: Normal appearance. He is well-developed and normal weight. He is not ill-appearing, toxic-appearing or diaphoretic. HENT:      Head: Normocephalic and atraumatic.       Nose: Nose normal.      Mouth/Throat:      Mouth: Mucous membranes MCAs and ACAs are widely patent. Signed by Dr Judah Goyal on 5/25/2021 3:19 PM    XR CHEST (2 VW)    Result Date: 6/9/2021  XR CHEST (2 VW) 6/9/2021 2:19 PM History: Preop vascular surgery. Carotid occlusive disease. Smoking history. Two-view chest x-ray with no comparison. The heart size is normal. The mediastinum is within normal limits. The lungs are adequately expanded with no pneumonia or pneumothorax. There is no significant pleural fluid. No congestive failure changes. Moderate thoracic endplate spurring. 1. No acute lung disease. Signed by Dr Adriana Robin on 6/9/2021 3:20 PM    CTA NECK W CONTRAST    Result Date: 5/25/2021  Exam: CT angiography with 3D MIP images neck with IV contrast - 5/25/2021 11:50 AM Indication: I65.23 Comparison: None available. DLP: 1571 mGy cm. In order to have a CT radiation dose as low as reasonably achievable, Automated Exposure Control was utilized for adjustment of the mA and/or KV according to patient size. Findings: All estimates of stenosis per published NASCET criteria. Aortic arch branch origins appear widely patent but evaluation is limited by streak artifact from dense contrast in the brachycephalic vein. Both subclavian arteries appear patent. Prominent atherosclerotic plaque in the distal aortic arch is noted. RIGHT common carotid artery is widely patent. Heavy atherosclerotic calcification and plaque at the RIGHT carotid bifurcation causes severe stenosis of the RIGHT internal carotid artery origin (80% narrowing, 1 mm narrow lumen, 5 mm distal patent lumen). The RIGHT internal carotid artery takes a retropharyngeal course. LEFT common carotid artery appears widely patent. Approximately 60% atherosclerotic stenosis of the LEFT internal carotid artery origin secondary to heavy atherosclerotic plaque at the carotid bulb (2 mm narrow lumen, 5 mm distal patent lumen).  The RIGHT vertebral artery is widely patent. The LEFT vertebral artery is only faintly opacified throughout its course and appears occluded just proximal to foramen magnum. Nonvascular findings: Parapharyngeal fat planes are maintained. Parotid glands appear normal. Submandibular glands appear normal. No cervical lymphadenopathy. No suspicious thyroid nodule. Included lung apices are clear. No focal asymmetry of the aerodigestive tract. Severe multilevel cervical spine degenerative change. Impression: 1.  80% narrowing of the RIGHT internal carotid artery origin and 60% narrowing of the LEFT internal carotid artery origin secondary to heavy atherosclerotic plaque and calcification. 2.  Only faint opacification of the LEFT vertebral artery with appearance of occlusion distally near foramen magnum. 3.  Severe multilevel cervical spine degenerative change. Signed by Dr Sanket Delacruz on 5/25/2021 3:28 PM      Assessment:  1. Carotid artery stenosis  2.  2180% right internal carotid artery stenosis at the origin 60% on the left faint opacification left vertebral artery appearance of occlusion distally near foramen magnum  3. Severe multilevel cervical spine degenerative changes by CT angiogram 5/25/2021  4. Postoperative day 0 following right carotid endarterectomy  5. Postoperative episode of rapid tachycardia? Atrial fibrillation now converted sinus rhythm  6. No typical exertional angina or limiting dyspnea        Recommendations:  1. Echocardiogram  2. Daily EKG  3. Check troponin  4. We will reassess tomorrow further comments to follow  5.  Will benefit from a stress test at least at some point possibly as an outpatient

## 2021-06-15 NOTE — OP NOTE
Preoperative Diagnosis:  1. Asymptomatic right carotid artery stenosis    Post Op Diagnosis: Same    Operative Procedure:  1. Right carotid endarterectomy with bovine pericardial catch  2. Right cervical carotid arteriograms    Anesthesia:  General endotracheal    Estimated Blood Loss:  100 mL    Specimens:  Plaque to pathology    Drains:  Fluted SAMEER right neck wound    Findings:   1. There was at least 80 percent stenosis of the right internal carotid artery. Organized thrombus within ulceration. 2.  The stenosis extended 4 cm beyond the carotid bulb. 3.  Post cervical carotid arteriogram showed a widely patent internal carotid artery. The external carotid artery remains patent. Procedure in detail:    After the patient was consented and given intravenous antibiotics, he was brought to the operating room and placed on the operating room table in the supine position. General endotracheal anesthesia was achieved. The patient's right neck was prepped and draped in the usual sterile procedure. A curvilinear incision in the right neck was made anterior to the sternocleidomastoid with scalpel. This was carried through subcutaneous tissue and platysma with bovie electrocautery. Facial vein branches are transected between hemostats and ligated with 3-0 Vicryl and 4-0 Vicryl sutures and/or clips. The patient was given intravenous heparin and the right common carotid and superior thyroid arteries were then dissected circumferentially and vesseloops placed for future vascular control. Finally, the internal carotid artery is dissected  and vessel loops were placed for future vascular control. Ansa cervicalis was not transected between clips. The hypoglossal and vagus nerves were carefully identified and protected throughout this dissection. After adequate exposure and heparinization time, the distal internal, common and external carotid arteries are clamped.   A longitudinal arteriotomy made in the

## 2021-06-16 ENCOUNTER — APPOINTMENT (OUTPATIENT)
Dept: CT IMAGING | Age: 76
DRG: 038 | End: 2021-06-16
Attending: SURGERY
Payer: MEDICARE

## 2021-06-16 LAB
ANION GAP SERPL CALCULATED.3IONS-SCNC: 12 MMOL/L (ref 7–19)
BASOPHILS ABSOLUTE: 0 K/UL (ref 0–0.2)
BASOPHILS RELATIVE PERCENT: 0.1 % (ref 0–1)
BUN BLDV-MCNC: 16 MG/DL (ref 8–23)
CALCIUM SERPL-MCNC: 8.8 MG/DL (ref 8.8–10.2)
CHLORIDE BLD-SCNC: 102 MMOL/L (ref 98–111)
CO2: 25 MMOL/L (ref 22–29)
CREAT SERPL-MCNC: 1.1 MG/DL (ref 0.5–1.2)
EKG P AXIS: 73 DEGREES
EKG P-R INTERVAL: 182 MS
EKG Q-T INTERVAL: 416 MS
EKG QRS DURATION: 88 MS
EKG QTC CALCULATION (BAZETT): 437 MS
EKG T AXIS: 85 DEGREES
EOSINOPHILS ABSOLUTE: 0 K/UL (ref 0–0.6)
EOSINOPHILS RELATIVE PERCENT: 0 % (ref 0–5)
GFR AFRICAN AMERICAN: >59
GFR NON-AFRICAN AMERICAN: >60
GLUCOSE BLD-MCNC: 147 MG/DL (ref 74–109)
HCT VFR BLD CALC: 32.4 % (ref 42–52)
HEMOGLOBIN: 11 G/DL (ref 14–18)
IMMATURE GRANULOCYTES #: 0 K/UL
LYMPHOCYTES ABSOLUTE: 1.2 K/UL (ref 1.1–4.5)
LYMPHOCYTES RELATIVE PERCENT: 12.6 % (ref 20–40)
MCH RBC QN AUTO: 32.2 PG (ref 27–31)
MCHC RBC AUTO-ENTMCNC: 34 G/DL (ref 33–37)
MCV RBC AUTO: 94.7 FL (ref 80–94)
MONOCYTES ABSOLUTE: 0.8 K/UL (ref 0–0.9)
MONOCYTES RELATIVE PERCENT: 8.6 % (ref 0–10)
NEUTROPHILS ABSOLUTE: 7.4 K/UL (ref 1.5–7.5)
NEUTROPHILS RELATIVE PERCENT: 78.3 % (ref 50–65)
PDW BLD-RTO: 13.4 % (ref 11.5–14.5)
PLATELET # BLD: 247 K/UL (ref 130–400)
PMV BLD AUTO: 10.1 FL (ref 9.4–12.4)
POTASSIUM SERPL-SCNC: 4.4 MMOL/L (ref 3.5–5)
RBC # BLD: 3.42 M/UL (ref 4.7–6.1)
SODIUM BLD-SCNC: 139 MMOL/L (ref 136–145)
WBC # BLD: 9.5 K/UL (ref 4.8–10.8)

## 2021-06-16 PROCEDURE — 85025 COMPLETE CBC W/AUTO DIFF WBC: CPT

## 2021-06-16 PROCEDURE — 99024 POSTOP FOLLOW-UP VISIT: CPT | Performed by: NURSE PRACTITIONER

## 2021-06-16 PROCEDURE — 6370000000 HC RX 637 (ALT 250 FOR IP): Performed by: SURGERY

## 2021-06-16 PROCEDURE — 1210000000 HC MED SURG R&B

## 2021-06-16 PROCEDURE — 36415 COLL VENOUS BLD VENIPUNCTURE: CPT

## 2021-06-16 PROCEDURE — 80048 BASIC METABOLIC PNL TOTAL CA: CPT

## 2021-06-16 PROCEDURE — 93010 ELECTROCARDIOGRAM REPORT: CPT | Performed by: INTERNAL MEDICINE

## 2021-06-16 PROCEDURE — 71275 CT ANGIOGRAPHY CHEST: CPT

## 2021-06-16 PROCEDURE — 6360000004 HC RX CONTRAST MEDICATION: Performed by: NURSE PRACTITIONER

## 2021-06-16 PROCEDURE — 6370000000 HC RX 637 (ALT 250 FOR IP): Performed by: NURSE PRACTITIONER

## 2021-06-16 PROCEDURE — 2580000003 HC RX 258: Performed by: SURGERY

## 2021-06-16 PROCEDURE — 2580000003 HC RX 258: Performed by: NURSE PRACTITIONER

## 2021-06-16 PROCEDURE — 93005 ELECTROCARDIOGRAM TRACING: CPT | Performed by: INTERNAL MEDICINE

## 2021-06-16 RX ORDER — ROSUVASTATIN CALCIUM 10 MG/1
10 TABLET, COATED ORAL NIGHTLY
Status: DISCONTINUED | OUTPATIENT
Start: 2021-06-16 | End: 2021-06-17 | Stop reason: HOSPADM

## 2021-06-16 RX ADMIN — ASPIRIN 81 MG: 81 TABLET, COATED ORAL at 08:23

## 2021-06-16 RX ADMIN — CLOPIDOGREL BISULFATE 75 MG: 75 TABLET ORAL at 08:23

## 2021-06-16 RX ADMIN — Medication 10 ML: at 08:22

## 2021-06-16 RX ADMIN — PAROXETINE HYDROCHLORIDE 20 MG: 20 TABLET, FILM COATED ORAL at 08:23

## 2021-06-16 RX ADMIN — ROSUVASTATIN CALCIUM 10 MG: 10 TABLET, FILM COATED ORAL at 20:52

## 2021-06-16 RX ADMIN — IOPAMIDOL 90 ML: 755 INJECTION, SOLUTION INTRAVENOUS at 17:20

## 2021-06-16 RX ADMIN — Medication 10 ML: at 20:53

## 2021-06-16 ASSESSMENT — PAIN SCALES - GENERAL
PAINLEVEL_OUTOF10: 0

## 2021-06-16 NOTE — CONSULTS
**Physician Signature**  This document was electronically signed by: Javier Rubio MD    06/15/2021 10:18 PM    **Consult Information**  Member Facility: [address] [place] [address] MRN: [id number]  Visit/Encounter Number: [id number]  Consult ID: [id number]  Facility Time Zone: CT  Date and Time of Request: 06/15/2021 09:03 PM  Requesting Clinician: Smita Montano MD  Patient Name: July Chappell  YOB: 1945  Gender: Male  Patient identity was confirmed at the beginning of the consult with the   patient/family/staff using two personal identifiers: Patient name and       **Admission**  Admission Date: 06-  Chief reason for ICU admission: CEA c/b - ?afib    **Core Metrics**  General orienting sentence for patient: Pt is a 69 yo male R CEA and had SVT   vs Afib w RVR- no gtt needed, watching overnight  Chief physiologic deterioration: None - Stable patient  Is the patient on DVT prophylaxis?: Yes  Prophylaxis type: Mechanical, Pharmacological  Is the patient on GI prophylaxis?: Yes  Has this patient reached their nutritional goal?: Yes  Are there current issues with pain management in this patient?:   No  Are there issues with skin integrity?: No  Are there issues with delirium?: No  Has the patient been mobilized?: No  Is this patient currently intubated?: No  Are there ethical or care philosophy or family issues?: No  Do you recommend an in depth evaluation?: No  Do you recommend the patient should: : Continue ICU level of care

## 2021-06-16 NOTE — PLAN OF CARE
Problem: Falls - Risk of:  Goal: Will remain free from falls  Description: Will remain free from falls  Outcome: Ongoing  Goal: Absence of physical injury  Description: Absence of physical injury  Outcome: Ongoing     Problem: Discharge Planning:  Goal: Participates in care planning  Description: Participates in care planning  Outcome: Ongoing  Goal: Discharged to appropriate level of care  Description: Discharged to appropriate level of care  Outcome: Ongoing     Problem: Airway Clearance - Ineffective:  Goal: Ability to maintain a clear airway will improve  Description: Ability to maintain a clear airway will improve  Outcome: Ongoing     Problem: Bowel Function - Altered:  Goal: Bowel elimination is within specified parameters  Description: Bowel elimination is within specified parameters  Outcome: Ongoing     Problem: Pain:  Description: Pain management should include both nonpharmacologic and pharmacologic interventions.   Goal: Pain level will decrease  Description: Pain level will decrease  Outcome: Ongoing  Goal: Recognizes and communicates pain  Description: Recognizes and communicates pain  Outcome: Ongoing  Goal: Control of acute pain  Description: Control of acute pain  Outcome: Ongoing  Goal: Control of chronic pain  Description: Control of chronic pain  Outcome: Ongoing     Problem: Tissue Perfusion - Cardiopulmonary, Altered:  Goal: Absence of angina  Description: Absence of angina  Outcome: Ongoing  Goal: Hemodynamic stability will improve  Description: Hemodynamic stability will improve  Outcome: Ongoing

## 2021-06-16 NOTE — PROGRESS NOTES
Mirian Aschoff transferred to 335 from 148 via bed. Reason for transfer: Lower level of care   Explained reason for transfer to Patient. Belongings: Glasses, dentures, books, bag of clothes with patient at bedside . Soft chart transferred with patient: Yes. Telemetry box number 19 transferred with patient: yes. Report given to: Walker Hernandez RN, via telephone.       Electronically signed by Aissatou Yee RN on 6/16/2021 at 3:04 PM

## 2021-06-16 NOTE — PROGRESS NOTES
Ronald Vnace received from ICU to room # 335 . Mental Status: Patient is oriented, alert, coherent, logical, thought processes intact and able to concentrate and follow conversation. Vitals:    06/16/21 1520   BP: 129/64   Pulse: 86   Resp: 16   Temp: 98.2 °F (36.8 °C)   SpO2: 96%     Placed on cardiac monitor: Yes. Box # [id number]. Belongings: Glasses, clothing with patient at bedside . Family at bedside Yes. Oriented Patient to room. Call light within reach. Yes. Transfer was: Well tolerated by patient. .    Electronically signed by Marcio Stewart RN on 6/16/2021 at 3:59 PM

## 2021-06-16 NOTE — PROGRESS NOTES
Vascular Surgery  Dr. Maite Jean-Baptiste   Daily Progress Note    Pt Name: Sonya Brody,[address] Floor [place] Record Number: [id number]  Date of Birth 1945   Today's Date: 6/16/2021        SUBJECTIVE:     Patient was seen and examined. Sitting up in bed, stated he felt great. Denies any complaints.      OBJECTIVE:     Patient Vitals for the past 24 hrs:   BP Temp Temp src Pulse Resp SpO2 Weight   06/16/21 0900 101/66 -- -- 79 14 97 % --   06/16/21 0800 104/73 98 °F (36.7 °C) Temporal 77 13 94 % --   06/16/21 0700 110/76 -- -- 74 14 96 % --   06/16/21 0615 92/63 -- -- 78 15 97 % --   06/16/21 0600 -- -- -- 72 11 96 % --   06/16/21 0500 -- -- -- 71 11 95 % --   06/16/21 0400 -- 98.1 °F (36.7 °C) Temporal 86 16 97 % 141 lb 11.2 oz (64.3 kg)   06/16/21 0300 -- -- -- 77 13 96 % --   06/16/21 0200 -- -- -- 77 12 95 % --   06/16/21 0100 -- -- -- 83 11 96 % --   06/16/21 0000 -- 97.8 °F (36.6 °C) Temporal 93 15 96 % --   06/15/21 2300 -- -- -- 87 13 94 % --   06/15/21 2200 -- -- -- 88 11 97 % --   06/15/21 2100 -- -- -- 91 18 96 % --   06/15/21 2000 -- 97.7 °F (36.5 °C) Temporal 93 15 95 % --   06/15/21 1900 -- -- -- 91 22 100 % --   06/15/21 1800 -- -- -- 79 19 99 % --   06/15/21 1700 -- -- -- 82 22 99 % --   06/15/21 1600 -- -- -- 86 13 100 % --   06/15/21 1500 -- -- -- 86 19 99 % --   06/15/21 1400 -- -- -- 90 15 99 % --   06/15/21 1300 -- -- -- 95 18 99 % --   06/15/21 1207 -- 97.6 °F (36.4 °C) Temporal 98 22 92 % --   06/15/21 1155 102/77 -- -- 97 11 100 % --   06/15/21 1154 -- -- -- 94 -- -- --   06/15/21 1145 104/74 97.3 °F (36.3 °C) -- 96 11 100 % --   06/15/21 1140 129/74 -- -- 96 12 100 % --   06/15/21 1139 -- -- -- 95 -- -- --   06/15/21 1130 88/72 -- -- 96 12 100 % --   06/15/21 1124 -- -- -- 96 -- -- --   06/15/21 1115 89/71 -- -- 100 13 98 % --   06/15/21 1110 93/66 -- -- 98 13 98 % --   06/15/21 1109 -- -- -- 97 -- -- --   06/15/21 1105 -- -- -- 98 -- -- --   06/15/21 1058 89/68 -- -- 103 14 100 % --   06/15/21 1055 85/62 -- -- 104 11 98 % --   06/15/21 1050 86/69 -- -- 101 16 97 % --   06/15/21 1045 (!) 79/65 -- -- 100 8 100 % --   06/15/21 1040 -- -- -- 105 18 97 % --         Intake/Output Summary (Last 24 hours) at 6/16/2021 1024  Last data filed at 6/16/2021 0800  Gross per 24 hour   Intake 1376.67 ml   Output 1820 ml   Net -443.33 ml       In: 1200 [P.O.:500; I.V.:600]  Out: 1580 [Urine:1535; Drains:45]    I/O last 3 completed shifts: In: 2576.7 [P.O.:500; I.V.:1976.7; IV Piggyback:100]  Out: 2165 [Urine:1910; Drains:55; Blood:200]     Date 06/16/21 0000 - 06/16/21 2359   Shift 3903-2739 9547-0533 7848-6523 24 Hour Total   INTAKE   P.O. 100   100   IV Piggyback 100   100   Shift Total(mL/kg) 200(3.1)   200(3.1)   OUTPUT   Urine(mL/kg/hr) 975(1.9)   975   Drains 10 5  15   Shift Total(mL/kg) 985(15.3) 5(0.1)  990(15.4)   Weight (kg) 64.3 64.3 64.3 64.3       Wt Readings from Last 3 Encounters:   06/16/21 141 lb 11.2 oz (64.3 kg)   06/09/21 143 lb (64.9 kg)   05/13/21 140 lb (63.5 kg)        Body mass index is 22.19 kg/m². Diet: ADULT DIET; Regular        MEDS:     Scheduled Meds:   aspirin  81 mg Oral Daily    clopidogrel  75 mg Oral Daily    hydroCHLOROthiazide  25 mg Oral Daily    lisinopril  40 mg Oral Daily    PARoxetine  20 mg Oral QAM    sodium chloride flush  5-40 mL Intravenous 2 times per day     Continuous Infusions:   sodium chloride 25 mL (06/15/21 2309)     PRN Meds:sodium chloride flush, 5-40 mL, PRN  sodium chloride, 25 mL, PRN  oxyCODONE, 5 mg, Q4H PRN   Or  oxyCODONE, 10 mg, Q4H PRN  ondansetron, 4 mg, Q8H PRN   Or  ondansetron, 4 mg, Q6H PRN  HYDROmorphone, 0.25 mg, Q3H PRN   Or  HYDROmorphone, 0.5 mg, Q3H PRN  metoprolol tartrate, 25 mg, Q12H PRN  cloNIDine, 0.1 mg, Q2H PRN  hydrALAZINE, 10 mg, Q1H PRN          PHYSICAL EXAM:     CONSTITUTIONAL: Alert and oriented times 3, no acute distress and cooperative to examination. HEENT: Normocephalic. Atraumatic. REZA. NECK: Supple.  Mild swelling left side of neck with SAMEER to bulb suction. Minimal serosanguinous drainage. LUNGS: Coarse bilateral breath sounds with scattered rhonchi, clears with cough. No wheezes. CARDIOVASCULAR: Normal HT with RRR. No murmurs, gallops or rubs. ABDOMEN: Soft, non-distended with active bowel sounds. NEUROLOGIC: Grossly intact. Strong, equal bilateral , shoulder shrug, and BLE.   WOUND/INCISION: Right neck incision with edges well approximated. No drainage or erythema. Moderate amount of bruising and swelling. SAMEER drain to bulb suction with minimal serosanguinous drainage. EXTREMITY: No clubbing or cyanosis. No peripheral edema. Pedal pulses palpated. LABS:     CBC:   Recent Labs     06/16/21  0110   WBC 9.5   RBC 3.42*   HGB 11.0*   HCT 32.4*   MCV 94.7*   MCH 32.2*   MCHC 34.0   RDW 13.4      MPV 10.1      Last 3 CMP:   Recent Labs     06/15/21  1356 06/16/21  0110   NA  --  139   K 4.3 4.4   CL  --  102   CO2  --  25   BUN  --  16   CREATININE  --  1.1   GLUCOSE  --  147*   CALCIUM  --  8.8      Troponin:   Recent Labs     06/15/21  1356   TROPONINI <0.01     Calcium:   Lab Results   Component Value Date    CALCIUM 8.8 06/16/2021    CALCIUM 9.9 06/09/2021    CALCIUM 9.7 05/13/2021      Lipids:   Recent Labs     06/15/21  0559   CHOL 212*   HDL 41*           DVT prophylaxis: [] Lovenox                                 [] SCDs                                 [] SQ Heparin                                 [x] Encourage ambulation, low risk for DVT, no chemical or                                      mechanical prophylaxis necessary              [] Already on Anticoagulation          ASSESSMENT:     1. Right Carotid Artery Stenosis, Asymptomatic  2. Postoperative Atrial Fibrillation with RVR - Currently in SR.  3. Essential HTN  4. Mixed Hyperlipidemia      PLAN:     1. D/C SAMEER Drain  2. Transfer to Vascular Floor with Telemetry      Patient seen in conjunction with Dr. Jovan Menjivar.        Marva Latif, APRN-BC

## 2021-06-17 VITALS
DIASTOLIC BLOOD PRESSURE: 70 MMHG | WEIGHT: 141.7 LBS | BODY MASS INDEX: 22.24 KG/M2 | RESPIRATION RATE: 18 BRPM | HEIGHT: 67 IN | SYSTOLIC BLOOD PRESSURE: 106 MMHG | HEART RATE: 94 BPM | OXYGEN SATURATION: 98 % | TEMPERATURE: 97.3 F

## 2021-06-17 PROCEDURE — 99024 POSTOP FOLLOW-UP VISIT: CPT | Performed by: NURSE PRACTITIONER

## 2021-06-17 PROCEDURE — 6370000000 HC RX 637 (ALT 250 FOR IP): Performed by: NURSE PRACTITIONER

## 2021-06-17 PROCEDURE — 93246 EXT ECG>7D<15D RECORDING: CPT

## 2021-06-17 PROCEDURE — 99231 SBSQ HOSP IP/OBS SF/LOW 25: CPT | Performed by: INTERNAL MEDICINE

## 2021-06-17 PROCEDURE — 2580000003 HC RX 258: Performed by: NURSE PRACTITIONER

## 2021-06-17 RX ORDER — ROSUVASTATIN CALCIUM 10 MG/1
10 TABLET, COATED ORAL NIGHTLY
Qty: 30 TABLET | Refills: 3 | Status: SHIPPED | OUTPATIENT
Start: 2021-06-17 | End: 2022-05-17

## 2021-06-17 RX ADMIN — CLOPIDOGREL BISULFATE 75 MG: 75 TABLET ORAL at 08:44

## 2021-06-17 RX ADMIN — ASPIRIN 81 MG: 81 TABLET, COATED ORAL at 08:44

## 2021-06-17 RX ADMIN — PAROXETINE HYDROCHLORIDE 20 MG: 20 TABLET, FILM COATED ORAL at 08:44

## 2021-06-17 RX ADMIN — HYDROCHLOROTHIAZIDE 25 MG: 25 TABLET ORAL at 08:44

## 2021-06-17 RX ADMIN — Medication 10 ML: at 08:44

## 2021-06-17 RX ADMIN — LISINOPRIL 40 MG: 20 TABLET ORAL at 08:44

## 2021-06-17 ASSESSMENT — PAIN SCALES - GENERAL: PAINLEVEL_OUTOF10: 0

## 2021-06-17 NOTE — PROGRESS NOTES
PRN  metoprolol tartrate, 25 mg, Q12H PRN  cloNIDine, 0.1 mg, Q2H PRN  hydrALAZINE, 10 mg, Q1H PRN        PHYSICAL EXAM:     CONSTITUTIONAL: Alert and oriented times 3, no acute distress and cooperative to examination. HEENT: Normal  NECK: Soft, trachea midline and straight, small soft hematoma noted mid-incision  LUNGS: Chest expands equally bilaterally upon respiration, no accessory muscle used. Ausculation reveals no wheezes, rales or rhonchi. CARDIOVASCULAR: Heart regular rate and rhythm  ABDOMEN: soft, nontender, nondistended  NEUROLOGIC: Awake, alert, oriented to name, place and time. Speech clear, swallow intact, moving extremities without difficulty,  equal  WOUND/INCISION:  Right neck incision line is SOLOMON with edges well approximated, Dermabond CDI. Incision line is soft to touch, small soft hematoma noted on proximal incision. EXTREMITY: feet warm to touch, pale color  LABS:     CBC:   Recent Labs     06/16/21  0110   WBC 9.5   RBC 3.42*   HGB 11.0*   HCT 32.4*   MCV 94.7*   MCH 32.2*   MCHC 34.0   RDW 13.4      MPV 10.1      Last 3 CMP:   Recent Labs     06/15/21  1356 06/16/21  0110   NA  --  139   K 4.3 4.4   CL  --  102   CO2  --  25   BUN  --  16   CREATININE  --  1.1   GLUCOSE  --  147*   CALCIUM  --  8.8      Troponin:   Recent Labs     06/15/21  1356   TROPONINI <0.01     Calcium:   Lab Results   Component Value Date    CALCIUM 8.8 06/16/2021    CALCIUM 9.9 06/09/2021    CALCIUM 9.7 05/13/2021     Lipids:   Recent Labs     06/15/21  0559   CHOL 212*   HDL 41*         DVT prophylaxis:                                  [x] SCDs                            ASSESSMENT:     Operative Procedure 6/15/21 :  1. Right carotid endarterectomy with bovine pericardial catch  2.   Right cervical carotid arteriograms     HD # 2  Active Hospital Problems    Diagnosis Date Noted    Asymptomatic stenosis of right carotid artery [I65.21] 06/15/2021         Chief Complaint:  No chief complaint on file.      PLAN:     HR in 130's per telemetry, was NSR.  HR now in 80-90's  Plans for Zio patch placement at discharge  Follow up Vascular office on 6/9/21 for post op visit  Home instructions reviewed with patient, he voices understanding  Okay for dc home today per Vascular standpoint

## 2021-06-17 NOTE — PROGRESS NOTES
Cardiology Daily Note Camille Johnson MD      Patient:  Santiago Thakkar  [id number]    Patient Active Problem List    Diagnosis Date Noted    Asymptomatic stenosis of right carotid artery 06/15/2021       Admit Date:  6/15/2021    Admission Problem List: Present on Admission:   Asymptomatic stenosis of right carotid artery      Cardiac Specific Data:  Specialty Problems        Cardiology Problems    * (Principal) Asymptomatic stenosis of right carotid artery              Subjective:  Mr. Rodrigo Moncada seen today CTA pulmonary yesterday negative. Overall feels well denies chest pain or dyspnea. No new findings or complaints otherwise noted. Blood pressure 106/70 heart 94. Objective:   /70   Pulse 94   Temp 97.3 °F (36.3 °C)   Resp 18   Ht 5' 7\" (1.702 m)   Wt 141 lb 11.2 oz (64.3 kg)   SpO2 98%   BMI 22.19 kg/m²       Intake/Output Summary (Last 24 hours) at 6/17/2021 1530  Last data filed at 6/17/2021 1794  Gross per 24 hour   Intake 970 ml   Output --   Net 970 ml       Prior to Admission medications    Medication Sig Start Date End Date Taking?  Authorizing Provider   rosuvastatin (CRESTOR) 10 MG tablet Take 1 tablet by mouth nightly 6/17/21  Yes ROLANDO Rogel   aspirin 81 MG EC tablet Take 81 mg by mouth daily   Yes Historical Provider, MD   clopidogrel (PLAVIX) 75 MG tablet Take 1 tablet by mouth daily 5/28/21  Yes ROLANDO Evans   lisinopril (PRINIVIL;ZESTRIL) 40 MG tablet Take 40 mg by mouth daily   Yes Historical Provider, MD   HYDROCHLOROTHIAZIDE PO Take 25 mg by mouth daily    Yes Historical Provider, MD   PARoxetine (PAXIL) 20 MG tablet Take 20 mg by mouth every morning   Yes Historical Provider, MD           TELEMETRY: Sinus     Physical Exam:      Physical Exam      General:  Awake, alert, NAD  Skin:  Warm and dry  Neck:  no jvd , no carotid bruits  Chest:  Clear to auscultation, no wheezing or rales  Cardiovascular:  RRR D6I1 no murmurs, clicks, gallups, or rubs  Abdomen:  Soft nontender, nondistended, bowel sounds present  Extremities:  Edema: none       Lab Data:  CBC:   Recent Labs     06/16/21  0110   WBC 9.5   HGB 11.0*   HCT 32.4*   MCV 94.7*        BMP:   Recent Labs     06/15/21  1356 06/16/21  0110   NA  --  139   K 4.3 4.4   CL  --  102   CO2  --  25   BUN  --  16   CREATININE  --  1.1     LIVER PROFILE: No results for input(s): AST, ALT, LIPASE, BILIDIR, BILITOT, ALKPHOS in the last 72 hours. Invalid input(s): AMYLASE,  ALB  PT/INR: No results for input(s): PROTIME, INR in the last 72 hours. APTT: No results for input(s): APTT in the last 72 hours. BNP:  No results for input(s): BNP in the last 72 hours. CK, CKMB, Troponin: @LABRCNT (CKTOTAL:3, CKMB:3, TROPONINI:3)@    IMAGING:  ECHO Complete 2D W Doppler W Color    Result Date: 6/16/2021  Transthoracic Echocardiography Report (TTE)  Demographics   Patient Name   Laurie Henning  Date of Study            06/15/2021   MRN            [id number]        Gender                   Male   Date of Birth  1945    Room Number              MHL-0148   Age            70 year(s)   Height:        67 inches     Referring Physician      Marion Stanford MD   Weight:        140 pounds    Sonographer              Daksha Chaudhary   BSA:           1.74 m^2      Interpreting Physician   Marion Stanford MD   BMI:           21.93 kg/m^2  Procedure Type of Study   TTE procedure:ECHO NO CONTRAST WITH DOP/COLR. Study Location: Echo Lab Technical Quality: Adequate visualization Patient Status: Inpatient Rhythm: Within normal limits HR: 82 bpm BP: 142/55 mmHg Indications:Abnormal Heart Rhythm. Conclusions   Summary  Mitral valve leaflets are mildly thickened with preserved leaflet  mobility. Mildly thickened aortic valve leaflets with preserved leaflet mobility. Tricuspid valve is structurally normal.  Normal left ventricular size with preserved LV function and an estimated  ejection fraction of approximately 55-60%.   Mild concentric left MVA by PHT3.33 cm^2      CTA HEAD W WO CONTRAST    Result Date: 5/25/2021  Exam: CT angiography with 3D MIP images head without and with IV contrast - 5/25/2021 11:50 AM Indication: Bilateral carotid artery stenosis Comparison: None available. DLP: 1189 mGy cm. In order to have a CT radiation dose as low as reasonably achievable, Automated Exposure Control was utilized for adjustment of the mA and/or KV according to patient size. Findings: CT head without contrast: No evidence of intracranial hemorrhage. No loss of gray-white differentiation to suggest acute infarct. No midline shift or mass effect. Lateral ventricles are nondilated. Thousand cisterns are patent. Mild presumed chronic microvascular ischemic white matter change. No acute orbital finding. Mastoid air cells are clear. Paranasal sinuses are predominantly clear. No acute osseous finding. CTA head with IV contrast: Heavy vascular calcification of the RIGHT intracranial internal carotid artery, with mild to moderate narrowing of the supraclinoid RIGHT ICA. No evidence of severe RIGHT intracranial ICA narrowing. RIGHT MCA and ANNEMARIE are widely patent. Heavy vascular calcification of the LEFT intracranial internal carotid artery without evidence of flow-limiting stenosis. LEFT MCA and LEFT ANNEMARIE are widely patent. The distal LEFT vertebral artery is occluded just proximal to foramen magnum. Basilar artery and tip are widely patent but slightly diminutive. Bilateral PCAs are widely patent with fetal origin bilaterally. Impression: 1. 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 RIGHT vertebral artery is patent. Basilar artery and tip are diminutive but patent. Bilateral PCAs are widely patent with predominant fetal supply. 2.  Heavy bilateral intracranial ICA atherosclerotic calcification causing mild to moderate narrowing on the RIGHT. MCAs and ACAs are widely patent.  Signed by Dr Michelle Payton on 5/25/2021 3:19 PM    XR CHEST (2 VW)    Result Date: 6/9/2021  XR CHEST (2 VW) 6/9/2021 2:19 PM History: Preop vascular surgery. Carotid occlusive disease. Smoking history. Two-view chest x-ray with no comparison. The heart size is normal. The mediastinum is within normal limits. The lungs are adequately expanded with no pneumonia or pneumothorax. There is no significant pleural fluid. No congestive failure changes. Moderate thoracic endplate spurring. 1. No acute lung disease. Signed by Dr Meghan Lott on 6/9/2021 3:20 PM    CTA NECK W CONTRAST    Result Date: 5/25/2021  Exam: CT angiography with 3D MIP images neck with IV contrast - 5/25/2021 11:50 AM Indication: I65.23 Comparison: None available. DLP: 1571 mGy cm. In order to have a CT radiation dose as low as reasonably achievable, Automated Exposure Control was utilized for adjustment of the mA and/or KV according to patient size. Findings: All estimates of stenosis per published NASCET criteria. Aortic arch branch origins appear widely patent but evaluation is limited by streak artifact from dense contrast in the brachycephalic vein. Both subclavian arteries appear patent. Prominent atherosclerotic plaque in the distal aortic arch is noted. RIGHT common carotid artery is widely patent. Heavy atherosclerotic calcification and plaque at the RIGHT carotid bifurcation causes severe stenosis of the RIGHT internal carotid artery origin (80% narrowing, 1 mm narrow lumen, 5 mm distal patent lumen). The RIGHT internal carotid artery takes a retropharyngeal course. LEFT common carotid artery appears widely patent. Approximately 60% atherosclerotic stenosis of the LEFT internal carotid artery origin secondary to heavy atherosclerotic plaque at the carotid bulb (2 mm narrow lumen, 5 mm distal patent lumen). The RIGHT vertebral artery is widely patent.  The LEFT vertebral artery is only faintly opacified throughout its course and 5/25/2021  4. Postoperative day 0 following right carotid endarterectomy  5. Postoperative episode of rapid tachycardia? Atrial fibrillation now converted sinus rhythm  6. No typical exertional angina or limiting dyspnea  7. CTA pulmonary yesterday no evidence of pulmonary embolism or acute lung findings    Plan:  1.  Stable from a cardiac standpoint can be discharged    Clayton Paulson MD, MD 6/17/2021 3:30 PM

## 2021-06-17 NOTE — DISCHARGE SUMMARY
[facility] Vascular Surgery  Discharge Summary    Patient ID: Marshall Anand      Patient's PCP: Jack Solitario MD    Admit Date: 6/15/2021     Discharge Date:  06/17/2021    Admitting Physician: Maynor Edwards MD     Discharge Physician: Dr. Nelia Castro     Discharge Diagnoses:     Primary:   1. Right Carotid Artery Stenosis, Asymptomatic     Secondary:   2. Postoperative Atrial Fibrillation with RVR - Currently in SR.  3.         Essential HTN  4. Mixed Hyperlipidemia    Procedures This Admit:   1. On 06/15/2021, Right Carotid Endarterectomy by Dr. Schmitt Nap:   1. Dr. Elizabeth Hodge, Cardiology      Complications:   1. Postoperative Atrial Fibrillation with RVR     The patient was seen and examined on day of discharge and this discharge summary is in conjunction with any daily progress note from day of discharge. History of Present Illness and Hospital Course:   Mr. Ravi Moody is a 70year old male who was hospitalized earlier this year 2' to multiple episodes of syncope. During the hospital work-up he was found to have critical stenosis of right carotid artery. He was referred to vascular surgery for evaluation with recommendations. During visit on 05/13/2021, he also complained of claudication at a short distance, R>L. Review of carotid duplex revealed right carotid stenosis 70-99% with mild left carotid stenosis 50-69%. CTA head and neck, along with BLE RON were ordered. RON revealed severely diminished RON of BLE with right 0.39 and left 0.55. CTA head and neck revealed left vertebral appearing chronically occluded and right vertebral patent. 80% right ICA and 60% left ICA. Patient was seen via telephone visit on 05/28/2021. Results of carotid studies were discussed with patient with recommendation of right carotid endarterectomy. The operation was explained and discussed in detail, including the risks and benefits, with the patient.  He voiced understanding and was agreeable to proceed. Patient was admitted on 06/15/2021, taken to the OR and underwent RCE. He tolerated the operation without complication and was taken to PACU for recovery. While in PACU, patient went into atrial fibrillation RVR. Cardiology was consulted, and patient was seen by Dr. Jacobo Leos. He was treated with IV cardizem and converted to SR. He was taken to ICU for close monitoring. The following morning, patient remained in SR. He was hemodynamically and neurologically intact. He was felt stable for transfer to the vascular floor for ongoing monitoring. Cardiology checked a D-dimer which was elevated, therefore, a CTA pulmonary was completed and was negative for PE. On day of discharge, patient neurologically intact without deficits. He had remained in SR without any documented breakthrough atrial fibrillation. He is felt stable for discharge home with ZioPatch per Cardiology. Disposition:  Home    Follow-up:    1.  Hospital Follow-up with Dr. Martha Baldwin on 06/23/2021 at [phone] am.   2. Vascular Appointment with ROLANDO Stephen on 06/29/2021 at 11 am.   3. Cardiology Follow-up with ROLANDO Maldonado on 07/29/2021 at 11 am.       Discharge Medications:     aspirin 81 MG EC tablet  Take 81 mg by mouth daily     clopidogrel (PLAVIX) 75 MG tablet  Take 1 tablet by mouth daily     HYDROCHLOROTHIAZIDE PO  Take 25 mg by mouth daily      lisinopril (PRINIVIL;ZESTRIL) 40 MG tablet  Take 40 mg by mouth daily     PARoxetine (PAXIL) 20 MG tablet  Take 20 mg by mouth every morning     rosuvastatin (CRESTOR) 10 MG tablet  Take 1 tablet by mouth nightly

## 2021-06-28 ENCOUNTER — TELEPHONE (OUTPATIENT)
Dept: VASCULAR SURGERY | Age: 76
End: 2021-06-28

## 2021-06-29 ENCOUNTER — TELEPHONE (OUTPATIENT)
Dept: VASCULAR SURGERY | Age: 76
End: 2021-06-29

## 2021-07-08 ENCOUNTER — OFFICE VISIT (OUTPATIENT)
Dept: VASCULAR SURGERY | Age: 76
End: 2021-07-08
Payer: MEDICARE

## 2021-07-08 ENCOUNTER — PREP FOR PROCEDURE (OUTPATIENT)
Dept: VASCULAR SURGERY | Age: 76
End: 2021-07-08

## 2021-07-08 VITALS
DIASTOLIC BLOOD PRESSURE: 84 MMHG | HEART RATE: 98 BPM | SYSTOLIC BLOOD PRESSURE: 155 MMHG | OXYGEN SATURATION: 93 % | TEMPERATURE: 98 F

## 2021-07-08 DIAGNOSIS — I70.213 ATHEROSCLER OF NATIVE ARTERY OF BOTH LEGS WITH INTERMIT CLAUDICATION (HCC): Primary | ICD-10-CM

## 2021-07-08 DIAGNOSIS — I65.23 BILATERAL CAROTID ARTERY STENOSIS: ICD-10-CM

## 2021-07-08 PROCEDURE — G8427 DOCREV CUR MEDS BY ELIG CLIN: HCPCS | Performed by: NURSE PRACTITIONER

## 2021-07-08 PROCEDURE — 4040F PNEUMOC VAC/ADMIN/RCVD: CPT | Performed by: NURSE PRACTITIONER

## 2021-07-08 PROCEDURE — 1123F ACP DISCUSS/DSCN MKR DOCD: CPT | Performed by: NURSE PRACTITIONER

## 2021-07-08 PROCEDURE — 3017F COLORECTAL CA SCREEN DOC REV: CPT | Performed by: NURSE PRACTITIONER

## 2021-07-08 PROCEDURE — 4004F PT TOBACCO SCREEN RCVD TLK: CPT | Performed by: NURSE PRACTITIONER

## 2021-07-08 PROCEDURE — 99214 OFFICE O/P EST MOD 30 MIN: CPT | Performed by: NURSE PRACTITIONER

## 2021-07-08 PROCEDURE — 1111F DSCHRG MED/CURRENT MED MERGE: CPT | Performed by: NURSE PRACTITIONER

## 2021-07-08 PROCEDURE — G8420 CALC BMI NORM PARAMETERS: HCPCS | Performed by: NURSE PRACTITIONER

## 2021-07-08 RX ORDER — CLONIDINE HYDROCHLORIDE 0.1 MG/1
0.1 TABLET ORAL PRN
Status: CANCELLED | OUTPATIENT
Start: 2021-07-08

## 2021-07-08 RX ORDER — SODIUM CHLORIDE 0.9 % (FLUSH) 0.9 %
10 SYRINGE (ML) INJECTION EVERY 12 HOURS SCHEDULED
Status: CANCELLED | OUTPATIENT
Start: 2021-07-08

## 2021-07-08 RX ORDER — SODIUM CHLORIDE 0.9 % (FLUSH) 0.9 %
10 SYRINGE (ML) INJECTION PRN
Status: CANCELLED | OUTPATIENT
Start: 2021-07-08

## 2021-07-08 RX ORDER — ASPIRIN 81 MG/1
81 TABLET ORAL ONCE
Status: CANCELLED | OUTPATIENT
Start: 2021-07-08 | End: 2021-07-08

## 2021-07-08 RX ORDER — SODIUM CHLORIDE 9 MG/ML
25 INJECTION, SOLUTION INTRAVENOUS PRN
Status: CANCELLED | OUTPATIENT
Start: 2021-07-08

## 2021-07-08 RX ORDER — SODIUM CHLORIDE 9 MG/ML
INJECTION, SOLUTION INTRAVENOUS CONTINUOUS
Status: CANCELLED | OUTPATIENT
Start: 2021-07-08

## 2021-07-08 NOTE — H&P
Lorene Amador (:  1945) is a 70 y.o. male,Established patient, here for evaluation of the following chief complaint(s):  Post-Op Check            SUBJECTIVE/OBJECTIVE:  Yuniel Kee has a history of peripheral vascular disease of the lower extremities. He has had this for 1 - 5 years. Current treatment includes clopidogrel 75 mg po qd, ASA EC daily. Yuniel Kee has not had new wounds. Recently, he reports claudication at a distance of  100 feet. Yuniel Kee reports that the right leg is more signifcant than the left . He reports claudication is worsened and is mostly in the form of crampy type pain starting in the hips and calves. He has a short recovery time. This is reproducible in nature. He reports ischemic rest pain 0 times per night. He reports walking with cart does not help. He is post op from [address] [place] [address]. He has had no TIA, AF, or lateralizing weakness. No fever or chills. Lorene Amador is a 70 y.o. male with the following history as recorded in [facility]:  Patient Active Problem List    Diagnosis Date Noted    Asymptomatic stenosis of right carotid artery 06/15/2021     Current Outpatient Medications   Medication Sig Dispense Refill    rosuvastatin (CRESTOR) 10 MG tablet Take 1 tablet by mouth nightly 30 tablet 3    aspirin 81 MG EC tablet Take 81 mg by mouth daily      clopidogrel (PLAVIX) 75 MG tablet Take 1 tablet by mouth daily 30 tablet 3    lisinopril (PRINIVIL;ZESTRIL) 40 MG tablet Take 40 mg by mouth daily      HYDROCHLOROTHIAZIDE PO Take 25 mg by mouth daily       PARoxetine (PAXIL) 20 MG tablet Take 20 mg by mouth every morning       No current facility-administered medications for this visit. Allergies:  Other, Sulfa antibiotics, Beef-derived products, and Pork-derived products  Past Medical History:   Diagnosis Date    Allergy to alpha-gal     started in the     Carotid arterial disease ([address]     [address]A [facility]cerebral vascular accident) [facility]     loss of balance    Depression     Hypertension      Past Surgical History:   Procedure Laterality Date    CAROTID ENDARTERECTOMY Right 6/15/2021    RIGHT CAROTID ENDARTERECTOMY WITH COMPLETION ANGIOGRAM performed by Lino Erazo MD at [facility] Bilateral 2000   [address]     Family History   Problem Relation Age of Onset    Breast Cancer Mother      Social History     Tobacco Use    Smoking status: Current Every Day Smoker     Packs/day: 1.00     Years: 55.00     Pack years: 55.00     Types: Cigars     Start date: 28    Smokeless tobacco: Never Used    Tobacco comment: 1 cigar per day    Substance Use Topics    Alcohol use: Yes     Alcohol/week: 12.0 standard drinks     Types: 12 Cans of beer per week       ROS  Eyes - no sudden vision change or amaurosis. Respiratory - no significant shortness of breath,  Cardiovascular - no chest pain or syncope. No  significant leg swelling. No claudication. Musculoskeletal - no gait disturbance  Skin - no new wound. Neurologic -  No speech difficulty or lateralizing weakness. All other review of systems are negative. Physical Exam    BP (!) 155/84 (Site: Right Upper Arm, Position: Sitting, Cuff Size: Large Adult)   Pulse 98   Temp 98 °F (36.7 °C) (Temporal)   SpO2 93%       Neck- carotid pulses 2+ to palpation with no bruit; incision looks great  Cardiovascular - Regular rate and rhythm. Pulmonary - effort appears normal.  No respiratory distress. Lungs - Breath sounds normal. No wheezes or rales. Extremities -  - Radial and brachial pulses are 2+ to palpation bilaterally. Right femoral pulse: absent; Right popliteal pulse: absent Right DP: absent; Right PT absent; Left femoral pulse: present 2+; Left popliteal pulse: absent; Left DP: absent; Left PT: absent No cyanosis, clubbing, or significant edema. No signs atheroembolic event. Neurologic - alert and oriented X 3. Physiologic. Face symmetric. Skin - warm, dry, and intact.   no wound  Psychiatric - mood, affect, and behavior appear normal.  Judgment and thought processes appear normal.    Risk factors for atherosclerosis of all vascular beds have been reviewed with the patient including:  Family history, tobacco abuse in all forms, elevated cholesterol, hyperlipidemia, and diabetes. Lower extremity arterial study: Right RON 0.39, Left RON 0.55. Individual films reviewed: Yes. These results were reviewed with the patient. Disease process is chronic illness with severe exacerbation/progression          Options have been discussed with the patient including continued medical management vs. proceeding with angiogram with runoff and possible angioplasty/atherectomy/stent  . Patient has opted to proceed with this. Risks have been discussed with the patient including but not limited to MI, death, CVA, bleed, nerve injury, and infection. ASSESSMENT/PLAN:  1. Atheroscler of native artery of both legs with intermit claudication ([address]  2. Bilateral carotid artery stenosis    1. Atheroscler of native artery of both legs with intermit claudication ([address]    2. Bilateral carotid artery stenosis           Strongly encourage start/continue statin therapy -   Recommend no smoking - discussed the effect tobacco has on illness;   Proceed with angiogram with runoff and possible angioplasty/atherectomy/stent  Okay to stop plavix today  Continue asa/crestor          Patient instructed to walk as much as possible. Call our office with any progressive pain in leg(s) or hip(s) with walking. Take good care of your feet. Let us know right away if you develop a wound on your foot. An electronic signature was used to authenticate this note.     --ROLANDO Lovelace

## 2021-07-08 NOTE — LETTER
[facility]    Arteriogram Instructions    1. Report to the [facility] center at [facility] (go in the front door and to the left) on [address]. 7/15/2021, at 6:00 am.  2. Nothing to eat or drink after midnight the night before the procedure. 3. Please take all medications as normally scheduled to take, including heart and blood pressure medicines with a sip of water. Except Lisinopril and Hydrochlorothiazide do not take these medications the morning of your procedure. 4. Do not take Lasix, insulin, or any diabetic medicine the morning of the procedure. If you take insulin, you may only take 1/2 of any scheduled nightly dose, and none the morning of the procedure. You may take all regularly scheduled heart, cholesterol, and blood pressure medicines with a sip of water. 5. If you take Glucophage, Metformin, Actos Plus Met, or Glucovance,you can not take this the day of your procedure and two days after the procedure. 6. Hold Plavix/Coumadin for 0 days prior to surgery. 7. Do not hold Aspirin. 8. If you have sleep apnea and require C-PAP, please bring this with you to the hospital.  9. Bring a list of all of your allergies and medications with you to the hospital.  10. Please let our nurse know if you have had an allergy to iodine, shellfish, or x-ray dye. 6. Let the nurse know if you take any of the followin. Over the counter herbal supplements  2. Diclofenec, indomethacin, ketoprofen, Caridopa/levadopa, naproxen, sulindac, piroxicam, glucosamine, Chondrotin, cocchine, or methotrexate. 12. Plan to stay at the hospital for 4 - 6 hours before being released  by the physician. You will need someone to drive you home after the procedure. 13. Medications instructed to hold: See Above  14. Please stop at your local [employer] or pharmacy and buy a bottle of Hibiclens.  Wash thoroughly with this the night before and the morning of the procedure, paying special attention to the area that will be operated on. Make sure you rinse very well. The Hibiclens should only be used prior to surgery. 15. New policy requires that anyone who comes into the hospital will be required to wear a face mask. A cloth mask is acceptable. 16. To ensure the health and safety of our patients and staff, [facility] has implemented visitor restrictions. Only one person will be allowed to accompany you for your procedure. If you or your visitor are exhibiting signs & symptoms of illness such as fever, cough, sore throat or body aches, we ask that you reschedule your procedure to a later date after your symptoms have been resolved. 17. Other Directions: If you have any questions or concerns please contact our office at [phone] and ask for Cheyenne Mustafa. Unless instructed otherwise by your physician, cleanse incision/puncture site twice daily with soap and water. Apply dry gauze. Do not get in tub. Okay to shower. Do not apply any salve, cream, peroxide or alcohol to the incision. Call with any increasing redness or drainage. PLEASE NOTE:  If the patient is unable to sign his/her own paperwork, the appointed caregiver (POA, child, sibling, etc) must be present at the time of registration for all testing and procedures. Transportation to and from all testing and procedure appointments is the sole responsibility of the patient, caregiver, and/or nursing facility in which they reside. Please remember you will not be able to drive after you are discharged. Please call the office at [phone] with any questions or concerns. Please allow 48-72 hours notice for cancellations or rescheduling. We will attempt to accommodate your needs to the best of our capabilities, however, strict policies with procedure room availability does not allow much flexibility.

## 2021-07-08 NOTE — H&P (VIEW-ONLY)
Lety Main (:  1945) is a 70 y.o. male,Established patient, here for evaluation of the following chief complaint(s):  Post-Op Check            SUBJECTIVE/OBJECTIVE:  Betty Cavazos has a history of peripheral vascular disease of the lower extremities. He has had this for 1 - 5 years. Current treatment includes clopidogrel 75 mg po qd, ASA EC daily. Betty Cavazos has not had new wounds. Recently, he reports claudication at a distance of  100 feet. Betty Cavazos reports that the right leg is more signifcant than the left . He reports claudication is worsened and is mostly in the form of crampy type pain starting in the hips and calves. He has a short recovery time. This is reproducible in nature. He reports ischemic rest pain 0 times per night. He reports walking with cart does not help. He is post op from [address] [place] [address]. He has had no TIA, AF, or lateralizing weakness. No fever or chills. Lety Main is a 70 y.o. male with the following history as recorded in [facility]:  Patient Active Problem List    Diagnosis Date Noted    Asymptomatic stenosis of right carotid artery 06/15/2021     Current Outpatient Medications   Medication Sig Dispense Refill    rosuvastatin (CRESTOR) 10 MG tablet Take 1 tablet by mouth nightly 30 tablet 3    aspirin 81 MG EC tablet Take 81 mg by mouth daily      clopidogrel (PLAVIX) 75 MG tablet Take 1 tablet by mouth daily 30 tablet 3    lisinopril (PRINIVIL;ZESTRIL) 40 MG tablet Take 40 mg by mouth daily      HYDROCHLOROTHIAZIDE PO Take 25 mg by mouth daily       PARoxetine (PAXIL) 20 MG tablet Take 20 mg by mouth every morning       No current facility-administered medications for this visit. Allergies:  Other, Sulfa antibiotics, Beef-derived products, and Pork-derived products  Past Medical History:   Diagnosis Date    Allergy to alpha-gal     started in the s    Carotid arterial disease ([address]     CVA [facility]cerebral vascular accident) [facility]     loss of balance    Depression     wound  Psychiatric  mood, affect, and behavior appear normal.  Judgment and thought processes appear normal.    Risk factors for atherosclerosis of all vascular beds have been reviewed with the patient including:  Family history, tobacco abuse in all forms, elevated cholesterol, hyperlipidemia, and diabetes. Lower extremity arterial study: Right RON 0.39, Left RON 0.55. Individual films reviewed: Yes. These results were reviewed with the patient. Disease process is chronic illness with severe exacerbation/progression          Options have been discussed with the patient including continued medical management vs. proceeding with angiogram with runoff and possible angioplasty/atherectomy/stent  . Patient has opted to proceed with this. Risks have been discussed with the patient including but not limited to MI, death, CVA, bleed, nerve injury, and infection. ASSESSMENT/PLAN:  1. Atheroscler of native artery of both legs with intermit claudication ([address]  2. Bilateral carotid artery stenosis    1. Atheroscler of native artery of both legs with intermit claudication ([address]    2.  Bilateral carotid artery stenosis           Strongly encourage start/continue statin therapy -   Recommend no smoking - discussed the effect tobacco has on illness;   Proceed with angiogram with runoff and possible angioplasty/atherectomy/stent  Okay to stop plavix today  Continue asa/crestor

## 2021-07-08 NOTE — PROGRESS NOTES
Hypertension      Past Surgical History:   Procedure Laterality Date    CAROTID ENDARTERECTOMY Right 6/15/2021    RIGHT CAROTID ENDARTERECTOMY WITH COMPLETION ANGIOGRAM performed by Conrad Miller MD at [address] [facility] [address] Bilateral 2000   [facility]     Family History   Problem Relation Age of Onset    Breast Cancer Mother      Social History     Tobacco Use    Smoking status: Current Every Day Smoker     Packs/day: 1.00     Years: 55.00     Pack years: 55.00     Types: Cigars     Start date: 28    Smokeless tobacco: Never Used    Tobacco comment: 1 cigar per day    Substance Use Topics    Alcohol use: Yes     Alcohol/week: 12.0 standard drinks     Types: 12 Cans of beer per week       ROS  Eyes - no sudden vision change or amaurosis. Respiratory - no significant shortness of breath,  Cardiovascular - no chest pain or syncope. No  significant leg swelling. No claudication. Musculoskeletal - no gait disturbance  Skin - no new wound. Neurologic -  No speech difficulty or lateralizing weakness. All other review of systems are negative. Physical Exam    BP (!) 155/84 (Site: Right Upper Arm, Position: Sitting, Cuff Size: Large Adult)   Pulse 98   Temp 98 °F (36.7 °C) (Temporal)   SpO2 93%       Neck- carotid pulses 2+ to palpation with no bruit; incision looks great  Cardiovascular - Regular rate and rhythm. Pulmonary - effort appears normal.  No respiratory distress. Lungs - Breath sounds normal. No wheezes or rales. Extremities -  - Radial and brachial pulses are 2+ to palpation bilaterally. Right femoral pulse: absent; Right popliteal pulse: absent Right DP: absent; Right PT absent; Left femoral pulse: present 2+; Left popliteal pulse: absent; Left DP: absent; Left PT: absent No cyanosis, clubbing, or significant edema. No signs atheroembolic event. Neurologic - alert and oriented X 3. Physiologic. Face symmetric. Skin - warm, dry, and intact.   no wound  Psychiatric - mood, affect, and behavior appear normal.  Judgment and thought processes appear normal.    Risk factors for atherosclerosis of all vascular beds have been reviewed with the patient including:  Family history, tobacco abuse in all forms, elevated cholesterol, hyperlipidemia, and diabetes. Lower extremity arterial study: Right RON 0.39, Left RON 0.55. Individual films reviewed: Yes. These results were reviewed with the patient. Disease process is chronic illness with severe exacerbation/progression          Options have been discussed with the patient including continued medical management vs. proceeding with angiogram with runoff and possible angioplasty/atherectomy/stent  . Patient has opted to proceed with this. Risks have been discussed with the patient including but not limited to MI, death, CVA, bleed, nerve injury, and infection. ASSESSMENT/PLAN:  1. Atheroscler of native artery of both legs with intermit claudication ([address]  2. Bilateral carotid artery stenosis    1. Atheroscler of native artery of both legs with intermit claudication ([address]    2. Bilateral carotid artery stenosis           Strongly encourage start/continue statin therapy -   Recommend no smoking - discussed the effect tobacco has on illness;   Proceed with angiogram with runoff and possible angioplasty/atherectomy/stent  Okay to stop plavix today  Continue asa/crestor          Patient instructed to walk as much as possible. Call our office with any progressive pain in leg(s) or hip(s) with walking. Take good care of your feet. Let us know right away if you develop a wound on your foot. An electronic signature was used to authenticate this note.     --ROLANDO Pedraza

## 2021-07-08 NOTE — H&P
Dolores Sharpe (:  1945) is a 70 y.o. male,Established patient, here for evaluation of the following chief complaint(s):  Post-Op Check            SUBJECTIVE/OBJECTIVE:  Arminda Rapp has a history of peripheral vascular disease of the lower extremities. He has had this for 1 - 5 years. Current treatment includes clopidogrel 75 mg po qd, ASA EC daily. Arminda Rapp has not had new wounds. Recently, he reports claudication at a distance of  100 feet. Arminda Rapp reports that the right leg is more signifcant than the left . He reports claudication is worsened and is mostly in the form of crampy type pain starting in the hips and calves. He has a short recovery time. This is reproducible in nature. He reports ischemic rest pain 0 times per night. He reports walking with cart does not help. He is post op from [address] [place] [address]. He has had no TIA, AF, or lateralizing weakness. No fever or chills. Dolores Sharpe is a 70 y.o. male with the following history as recorded in [facility]:  Patient Active Problem List    Diagnosis Date Noted    Asymptomatic stenosis of right carotid artery 06/15/2021     Current Outpatient Medications   Medication Sig Dispense Refill    rosuvastatin (CRESTOR) 10 MG tablet Take 1 tablet by mouth nightly 30 tablet 3    aspirin 81 MG EC tablet Take 81 mg by mouth daily      clopidogrel (PLAVIX) 75 MG tablet Take 1 tablet by mouth daily 30 tablet 3    lisinopril (PRINIVIL;ZESTRIL) 40 MG tablet Take 40 mg by mouth daily      HYDROCHLOROTHIAZIDE PO Take 25 mg by mouth daily       PARoxetine (PAXIL) 20 MG tablet Take 20 mg by mouth every morning       No current facility-administered medications for this visit. Allergies:  Other, Sulfa antibiotics, Beef-derived products, and Pork-derived products  Past Medical History:   Diagnosis Date    Allergy to alpha-gal     started in the s    Carotid arterial disease ([address]     [facility]A [facility]cerebral vascular accident) [facility] [address]     loss of balance    Depression     Hypertension      Past Surgical History:   Procedure Laterality Date    CAROTID ENDARTERECTOMY Right 6/15/2021    RIGHT CAROTID ENDARTERECTOMY WITH COMPLETION ANGIOGRAM performed by Vikram Jane MD at [address] [facility] Bilateral 2000   [address]     Family History   Problem Relation Age of Onset    Breast Cancer Mother      Social History     Tobacco Use    Smoking status: Current Every Day Smoker     Packs/day: 1.00     Years: 55.00     Pack years: 55.00     Types: Cigars     Start date: 28    Smokeless tobacco: Never Used    Tobacco comment: 1 cigar per day    Substance Use Topics    Alcohol use: Yes     Alcohol/week: 12.0 standard drinks     Types: 12 Cans of beer per week       ROS  Eyes - no sudden vision change or amaurosis. Respiratory - no significant shortness of breath,  Cardiovascular - no chest pain or syncope. No  significant leg swelling. No claudication. Musculoskeletal - no gait disturbance  Skin - no new wound. Neurologic -  No speech difficulty or lateralizing weakness. All other review of systems are negative. Physical Exam    BP (!) 155/84 (Site: Right Upper Arm, Position: Sitting, Cuff Size: Large Adult)   Pulse 98   Temp 98 °F (36.7 °C) (Temporal)   SpO2 93%       Neck- carotid pulses 2+ to palpation with no bruit; incision looks great  Cardiovascular - Regular rate and rhythm. Pulmonary - effort appears normal.  No respiratory distress. Lungs - Breath sounds normal. No wheezes or rales. Extremities -  - Radial and brachial pulses are 2+ to palpation bilaterally. Right femoral pulse: absent; Right popliteal pulse: absent Right DP: absent; Right PT absent; Left femoral pulse: present 2+; Left popliteal pulse: absent; Left DP: absent; Left PT: absent No cyanosis, clubbing, or significant edema. No signs atheroembolic event. Neurologic - alert and oriented X 3. Physiologic. Face symmetric. Skin - warm, dry, and intact.   no wound  Psychiatric - mood, affect, and behavior appear normal.  Judgment and thought processes appear normal.    Risk factors for atherosclerosis of all vascular beds have been reviewed with the patient including:  Family history, tobacco abuse in all forms, elevated cholesterol, hyperlipidemia, and diabetes. Lower extremity arterial study: Right RON 0.39, Left RON 0.55. Individual films reviewed: Yes. These results were reviewed with the patient. Disease process is chronic illness with severe exacerbation/progression          Options have been discussed with the patient including continued medical management vs. proceeding with angiogram with runoff and possible angioplasty/atherectomy/stent  . Patient has opted to proceed with this. Risks have been discussed with the patient including but not limited to MI, death, CVA, bleed, nerve injury, and infection. ASSESSMENT/PLAN:  1. Atheroscler of native artery of both legs with intermit claudication ([address]  2. Bilateral carotid artery stenosis    1. Atheroscler of native artery of both legs with intermit claudication ([address]    2.  Bilateral carotid artery stenosis           Strongly encourage start/continue statin therapy -   Recommend no smoking - discussed the effect tobacco has on illness;   Proceed with angiogram with runoff and possible angioplasty/atherectomy/stent  Okay to stop plavix today  Continue asa/crestor

## 2021-07-09 ENCOUNTER — OFFICE VISIT (OUTPATIENT)
Dept: FAMILY MEDICINE CLINIC | Facility: CLINIC | Age: 76
End: 2021-07-09

## 2021-07-09 VITALS
OXYGEN SATURATION: 99 % | TEMPERATURE: 98.1 F | DIASTOLIC BLOOD PRESSURE: 86 MMHG | BODY MASS INDEX: 22.44 KG/M2 | HEART RATE: 97 BPM | HEIGHT: 67 IN | RESPIRATION RATE: 16 BRPM | SYSTOLIC BLOOD PRESSURE: 142 MMHG | WEIGHT: 143 LBS

## 2021-07-09 DIAGNOSIS — E78.2 MIXED HYPERLIPIDEMIA: ICD-10-CM

## 2021-07-09 DIAGNOSIS — I10 ESSENTIAL HYPERTENSION: ICD-10-CM

## 2021-07-09 DIAGNOSIS — I73.9 PERIPHERAL VASCULAR DISEASE (HCC): Primary | ICD-10-CM

## 2021-07-09 PROCEDURE — 99202 OFFICE O/P NEW SF 15 MIN: CPT | Performed by: FAMILY MEDICINE

## 2021-07-09 RX ORDER — LISINOPRIL 40 MG/1
40 TABLET ORAL
COMMUNITY
End: 2022-09-07 | Stop reason: SDUPTHER

## 2021-07-09 RX ORDER — PAROXETINE HYDROCHLORIDE 20 MG/1
20 TABLET, FILM COATED ORAL
COMMUNITY
End: 2022-09-07 | Stop reason: SDUPTHER

## 2021-07-09 NOTE — PROGRESS NOTES
"Subjective   Crys Sky is a 75 y.o. male.     Chief Complaint   Patient presents with   • Establish Care   • Coronary Artery Disease     Had clot removal done at [facility]   • Peripheral Vascular Disease       History of Present Illness     recently was admitted to [facility] for vascular surgery---he notes destin of htn and checks bp at home---toleiang statain without myaglais ---hx of depression on paxil      Current Outpatient Medications:   •  HYDROCHLOROTHIAZIDE PO, Take 25 mg by mouth., Disp: , Rfl:   •  lisinopril (PRINIVIL,ZESTRIL) 40 MG tablet, Take 40 mg by mouth., Disp: , Rfl:   •  PARoxetine (PAXIL) 20 MG tablet, Take 20 mg by mouth., Disp: , Rfl:   Allergies   Allergen Reactions   • Sulfa Antibiotics Rash     Childhood allergy   • Beef-Derived Products Hives   • Pork-Derived Products Hives       No past medical history on file.  No past surgical history on file.    Review of Systems   Constitutional: Negative.    HENT: Negative.    Eyes: Negative.    Respiratory: Negative.    Cardiovascular: Negative.    Gastrointestinal: Negative.    Endocrine: Negative.    Genitourinary: Negative.    Musculoskeletal: Negative.    Skin: Negative.    Allergic/Immunologic: Negative.    Neurological: Negative.    Hematological: Negative.    Psychiatric/Behavioral: Negative.        Objective  /86 (BP Location: Left arm)   Pulse 97   Temp 98.1 °F (36.7 °C)   Resp 16   Ht 170.2 cm (67\")   Wt 64.9 kg (143 lb)   SpO2 99%   BMI 22.40 kg/m²   Physical Exam  Vitals and nursing note reviewed.   Constitutional:       Appearance: Normal appearance. He is normal weight.   HENT:      Head: Normocephalic and atraumatic.      Nose: Nose normal.      Mouth/Throat:      Mouth: Mucous membranes are moist.      Pharynx: Oropharynx is clear.   Eyes:      Extraocular Movements: Extraocular movements intact.      Conjunctiva/sclera: Conjunctivae normal.      Pupils: Pupils are equal, round, and reactive to light.   Cardiovascular:      " Rate and Rhythm: Normal rate and regular rhythm.      Pulses: Normal pulses.      Heart sounds: Normal heart sounds.   Pulmonary:      Effort: Pulmonary effort is normal.      Breath sounds: Normal breath sounds.   Abdominal:      General: Abdomen is flat. Bowel sounds are normal.      Palpations: Abdomen is soft.   Musculoskeletal:         General: Normal range of motion.      Cervical back: Normal range of motion and neck supple.   Skin:     General: Skin is warm and dry.      Capillary Refill: Capillary refill takes less than 2 seconds.   Neurological:      General: No focal deficit present.      Mental Status: He is alert and oriented to person, place, and time. Mental status is at baseline.   Psychiatric:         Mood and Affect: Mood normal.         Behavior: Behavior normal.         Thought Content: Thought content normal.         Judgment: Judgment normal.         Assessment/Plan   Diagnoses and all orders for this visit:    1. Peripheral vascular disease (CMS/HCC) (Primary)    2. Essential hypertension    3. Mixed hyperlipidemia      He will continue meds for now..  He will moniotor bp at home and keep me informed       No orders of the defined types were placed in this encounter.      Follow up: 6 week(s)

## 2021-07-15 ENCOUNTER — HOSPITAL ENCOUNTER (OUTPATIENT)
Dept: INTERVENTIONAL RADIOLOGY/VASCULAR | Age: 76
Discharge: HOME | End: 2021-07-15
Payer: MEDICARE

## 2021-07-15 VITALS
RESPIRATION RATE: 14 BRPM | SYSTOLIC BLOOD PRESSURE: 107 MMHG | TEMPERATURE: 98 F | HEART RATE: 90 BPM | DIASTOLIC BLOOD PRESSURE: 76 MMHG | OXYGEN SATURATION: 95 %

## 2021-07-15 DIAGNOSIS — I73.9 PVD (PERIPHERAL VASCULAR DISEASE) WITH CLAUDICATION (HCC): ICD-10-CM

## 2021-07-15 DIAGNOSIS — I70.219 ATHEROSCLEROSIS WITH CLAUDICATION OF EXTREMITY (HCC): ICD-10-CM

## 2021-07-15 LAB
ANION GAP SERPL CALCULATED.3IONS-SCNC: 6 MMOL/L (ref 7–19)
BUN BLDV-MCNC: 29 MG/DL (ref 8–23)
CALCIUM SERPL-MCNC: 9.8 MG/DL (ref 8.8–10.2)
CHLORIDE BLD-SCNC: 101 MMOL/L (ref 98–111)
CO2: 31 MMOL/L (ref 22–29)
CREAT SERPL-MCNC: 1.1 MG/DL (ref 0.5–1.2)
GFR AFRICAN AMERICAN: >59
GFR NON-AFRICAN AMERICAN: >60
GLUCOSE BLD-MCNC: 123 MG/DL (ref 74–109)
HCT VFR BLD CALC: 36 % (ref 42–52)
HEMOGLOBIN: 12.1 G/DL (ref 14–18)
MCH RBC QN AUTO: 32.3 PG (ref 27–31)
MCHC RBC AUTO-ENTMCNC: 33.6 G/DL (ref 33–37)
MCV RBC AUTO: 96 FL (ref 80–94)
PDW BLD-RTO: 13.9 % (ref 11.5–14.5)
PLATELET # BLD: 239 K/UL (ref 130–400)
PMV BLD AUTO: 9.6 FL (ref 9.4–12.4)
POTASSIUM SERPL-SCNC: 4.7 MMOL/L (ref 3.5–5)
RBC # BLD: 3.75 M/UL (ref 4.7–6.1)
SODIUM BLD-SCNC: 138 MMOL/L (ref 136–145)
WBC # BLD: 7.5 K/UL (ref 4.8–10.8)

## 2021-07-15 PROCEDURE — 75625 CONTRAST EXAM ABDOMINL AORTA: CPT | Performed by: SURGERY

## 2021-07-15 PROCEDURE — 36200 PLACE CATHETER IN AORTA: CPT | Performed by: SURGERY

## 2021-07-15 PROCEDURE — 2580000003 HC RX 258: Performed by: NURSE PRACTITIONER

## 2021-07-15 PROCEDURE — 6360000004 HC RX CONTRAST MEDICATION: Performed by: SURGERY

## 2021-07-15 PROCEDURE — 75716 ARTERY X-RAYS ARMS/LEGS: CPT | Performed by: SURGERY

## 2021-07-15 PROCEDURE — 6360000002 HC RX W HCPCS: Performed by: SURGERY

## 2021-07-15 PROCEDURE — 36415 COLL VENOUS BLD VENIPUNCTURE: CPT

## 2021-07-15 PROCEDURE — 2500000003 HC RX 250 WO HCPCS: Performed by: SURGERY

## 2021-07-15 PROCEDURE — 2709999900 IR AORTAGRAM ABDOMINAL SERIALOGRAM

## 2021-07-15 PROCEDURE — 99152 MOD SED SAME PHYS/QHP 5/>YRS: CPT | Performed by: SURGERY

## 2021-07-15 PROCEDURE — 99153 MOD SED SAME PHYS/QHP EA: CPT | Performed by: SURGERY

## 2021-07-15 PROCEDURE — 80048 BASIC METABOLIC PNL TOTAL CA: CPT

## 2021-07-15 PROCEDURE — 85027 COMPLETE CBC AUTOMATED: CPT

## 2021-07-15 RX ORDER — SODIUM CHLORIDE 9 MG/ML
INJECTION, SOLUTION INTRAVENOUS CONTINUOUS
Status: DISCONTINUED | OUTPATIENT
Start: 2021-07-15 | End: 2021-07-17 | Stop reason: HOSPADM

## 2021-07-15 RX ORDER — SODIUM CHLORIDE 0.9 % (FLUSH) 0.9 %
10 SYRINGE (ML) INJECTION PRN
Status: DISCONTINUED | OUTPATIENT
Start: 2021-07-15 | End: 2021-07-17 | Stop reason: HOSPADM

## 2021-07-15 RX ORDER — LIDOCAINE HYDROCHLORIDE 20 MG/ML
INJECTION, SOLUTION INFILTRATION; PERINEURAL
Status: COMPLETED | OUTPATIENT
Start: 2021-07-15 | End: 2021-07-15

## 2021-07-15 RX ORDER — HYDROCODONE BITARTRATE AND ACETAMINOPHEN 5; 325 MG/1; MG/1
1 TABLET ORAL EVERY 4 HOURS PRN
Status: DISCONTINUED | OUTPATIENT
Start: 2021-07-15 | End: 2021-07-17 | Stop reason: HOSPADM

## 2021-07-15 RX ORDER — SODIUM CHLORIDE 9 MG/ML
25 INJECTION, SOLUTION INTRAVENOUS PRN
Status: DISCONTINUED | OUTPATIENT
Start: 2021-07-15 | End: 2021-07-17 | Stop reason: HOSPADM

## 2021-07-15 RX ORDER — SODIUM CHLORIDE 0.9 % (FLUSH) 0.9 %
10 SYRINGE (ML) INJECTION EVERY 12 HOURS SCHEDULED
Status: DISCONTINUED | OUTPATIENT
Start: 2021-07-15 | End: 2021-07-17 | Stop reason: HOSPADM

## 2021-07-15 RX ORDER — HYDROCODONE BITARTRATE AND ACETAMINOPHEN 5; 325 MG/1; MG/1
2 TABLET ORAL EVERY 4 HOURS PRN
Status: DISCONTINUED | OUTPATIENT
Start: 2021-07-15 | End: 2021-07-17 | Stop reason: HOSPADM

## 2021-07-15 RX ORDER — HEPARIN SODIUM 5000 [USP'U]/ML
INJECTION, SOLUTION INTRAVENOUS; SUBCUTANEOUS
Status: COMPLETED | OUTPATIENT
Start: 2021-07-15 | End: 2021-07-15

## 2021-07-15 RX ORDER — METHYLPREDNISOLONE SODIUM SUCCINATE 125 MG/2ML
INJECTION, POWDER, LYOPHILIZED, FOR SOLUTION INTRAMUSCULAR; INTRAVENOUS
Status: COMPLETED | OUTPATIENT
Start: 2021-07-15 | End: 2021-07-15

## 2021-07-15 RX ORDER — IODIXANOL 320 MG/ML
INJECTION, SOLUTION INTRAVASCULAR
Status: COMPLETED | OUTPATIENT
Start: 2021-07-15 | End: 2021-07-15

## 2021-07-15 RX ORDER — ASPIRIN 81 MG/1
81 TABLET ORAL ONCE
Status: DISCONTINUED | OUTPATIENT
Start: 2021-07-15 | End: 2021-07-17 | Stop reason: HOSPADM

## 2021-07-15 RX ORDER — ACETAMINOPHEN 325 MG/1
650 TABLET ORAL EVERY 4 HOURS PRN
Status: DISCONTINUED | OUTPATIENT
Start: 2021-07-15 | End: 2021-07-17 | Stop reason: HOSPADM

## 2021-07-15 RX ORDER — DIPHENHYDRAMINE HYDROCHLORIDE 50 MG/ML
INJECTION INTRAMUSCULAR; INTRAVENOUS
Status: COMPLETED | OUTPATIENT
Start: 2021-07-15 | End: 2021-07-15

## 2021-07-15 RX ORDER — CLONIDINE HYDROCHLORIDE 0.1 MG/1
0.1 TABLET ORAL PRN
Status: DISCONTINUED | OUTPATIENT
Start: 2021-07-15 | End: 2021-07-17 | Stop reason: HOSPADM

## 2021-07-15 RX ORDER — ONDANSETRON 2 MG/ML
4 INJECTION INTRAMUSCULAR; INTRAVENOUS EVERY 8 HOURS PRN
Status: DISCONTINUED | OUTPATIENT
Start: 2021-07-15 | End: 2021-07-17 | Stop reason: HOSPADM

## 2021-07-15 RX ADMIN — LIDOCAINE HYDROCHLORIDE 10 ML: 20 INJECTION, SOLUTION INFILTRATION; PERINEURAL at 07:58

## 2021-07-15 RX ADMIN — METHYLPREDNISOLONE SODIUM SUCCINATE 250 MG: 125 INJECTION, POWDER, FOR SOLUTION INTRAMUSCULAR; INTRAVENOUS at 07:56

## 2021-07-15 RX ADMIN — DIPHENHYDRAMINE HYDROCHLORIDE 50 MG: 50 INJECTION, SOLUTION INTRAMUSCULAR; INTRAVENOUS at 07:56

## 2021-07-15 RX ADMIN — Medication 2000 MG: at 07:39

## 2021-07-15 RX ADMIN — SODIUM CHLORIDE: 9 INJECTION, SOLUTION INTRAVENOUS at 07:25

## 2021-07-15 RX ADMIN — IODIXANOL 120 ML: 320 INJECTION, SOLUTION INTRAVASCULAR at 08:23

## 2021-07-15 RX ADMIN — HEPARIN SODIUM 5000 UNITS: 5000 INJECTION, SOLUTION INTRAVENOUS; SUBCUTANEOUS at 07:59

## 2021-07-15 ASSESSMENT — PAIN SCALES - GENERAL
PAINLEVEL_OUTOF10: 0
PAINLEVEL_OUTOF10: 0

## 2021-07-15 NOTE — PROGRESS NOTES
Discharge instructions reviewed with patient and patient states understanding. Patient discharged from cath holding with all belongings and AVS. Transported pt to car via w/c for discharge home per ex wife.

## 2021-07-15 NOTE — PROGRESS NOTES
Patient out of bed with RN at bedside and ambulated to bathroom and back. Patient tolerated activity without difficulty.

## 2021-07-19 ENCOUNTER — TELEPHONE (OUTPATIENT)
Dept: VASCULAR SURGERY | Age: 76
End: 2021-07-19

## 2021-07-19 NOTE — TELEPHONE ENCOUNTER
I left a vm asking the pt to return my call to discuss the details of his upcoming surgery with Dr. Mira Bone.

## 2021-07-20 DIAGNOSIS — Z01.818 PRE-OP TESTING: Primary | ICD-10-CM

## 2021-07-20 NOTE — TELEPHONE ENCOUNTER
local [facility] or pharmacy and buy a bottle of Hibiclens. Wash thoroughly with this the night before and the morning of the procedure, paying special attention to the area that will be operated on. Make sure you rinse very well. The Hibiclens should only be used prior to surgery. 15. Please register at the [facility] Patient Registration on [address]. 7/22/0221 at 10:30 am for pre-op testing. You will not need to fast prior. 12. New policy requires that anyone who comes into the hospital will be required to wear a face mask. A cloth mask is acceptable. 17. To ensure the health and safety of our patients and staff, [facility] has implemented visitor restrictions. Only one person will be allowed to accompany you for your procedure. If you or your visitor are exhibiting signs & symptoms of illness such as fever, cough, sore throat or body aches, we ask that you reschedule your procedure to a later date after your symptoms have been resolved. 18. Other Directions: If you have any questions or concerns please contact our office at [phone] and ask for [place]. Unless instructed otherwise by your physician, cleanse incision/puncture site twice daily with soap and water. Apply dry gauze. Do not get in tub. Okay to shower. Do not apply any salve, cream, peroxide or alcohol to the incision. Call with any increasing redness or drainage. PLEASE NOTE:  If the patient is unable to sign his/her own paperwork, the appointed caregiver (POA, child, sibling, etc) must be present at the time of registration for all testing and procedures. Transportation to and from all testing and procedure appointments is the sole responsibility of the patient, caregiver, and/or nursing facility in which they reside. Please remember you will not be able to drive after you are discharged. Please call the office at [phone] with any questions or concerns.  Please allow 48-72 hours notice for cancellations or rescheduling. We will attempt to accommodate your needs to the best of our capabilities, however, strict policies with procedure room availability does not allow much flexibility.

## 2021-07-21 ENCOUNTER — TELEPHONE (OUTPATIENT)
Dept: VASCULAR SURGERY | Age: 76
End: 2021-07-21

## 2021-07-21 NOTE — TELEPHONE ENCOUNTER
I sw Mr. Slaughter Setting to let him know he will register at the [address] [place] [address] tomorrow for his pre-op appt. Daisy voiced understanding and is aware.

## 2021-07-22 ENCOUNTER — HOSPITAL ENCOUNTER (OUTPATIENT)
Dept: GENERAL RADIOLOGY | Age: 76
Discharge: HOME | End: 2021-07-22
Payer: MEDICARE

## 2021-07-22 ENCOUNTER — TELEPHONE (OUTPATIENT)
Dept: VASCULAR SURGERY | Age: 76
End: 2021-07-22

## 2021-07-22 ENCOUNTER — HOSPITAL ENCOUNTER (OUTPATIENT)
Dept: PREADMISSION TESTING | Age: 76
Discharge: HOME | End: 2021-07-26
Payer: MEDICARE

## 2021-07-22 VITALS — BODY MASS INDEX: 22.13 KG/M2 | HEIGHT: 67 IN | WEIGHT: 141 LBS

## 2021-07-22 DIAGNOSIS — Z01.818 PRE-OP TESTING: ICD-10-CM

## 2021-07-22 LAB
ABO/RH: NORMAL
ANION GAP SERPL CALCULATED.3IONS-SCNC: 8 MMOL/L (ref 7–19)
ANTIBODY SCREEN: NORMAL
APTT: 26.8 SEC (ref 26–36.2)
BUN BLDV-MCNC: 16 MG/DL (ref 8–23)
CALCIUM SERPL-MCNC: 9.6 MG/DL (ref 8.8–10.2)
CHLORIDE BLD-SCNC: 105 MMOL/L (ref 98–111)
CO2: 28 MMOL/L (ref 22–29)
CREAT SERPL-MCNC: 1.1 MG/DL (ref 0.5–1.2)
GFR AFRICAN AMERICAN: >59
GFR NON-AFRICAN AMERICAN: >60
GLUCOSE BLD-MCNC: 83 MG/DL (ref 74–109)
HCT VFR BLD CALC: 36.9 % (ref 42–52)
HEMOGLOBIN: 12.6 G/DL (ref 14–18)
INR BLD: 0.9 (ref 0.88–1.18)
MCH RBC QN AUTO: 32.6 PG (ref 27–31)
MCHC RBC AUTO-ENTMCNC: 34.1 G/DL (ref 33–37)
MCV RBC AUTO: 95.6 FL (ref 80–94)
PDW BLD-RTO: 13.6 % (ref 11.5–14.5)
PLATELET # BLD: 259 K/UL (ref 130–400)
PMV BLD AUTO: 10.3 FL (ref 9.4–12.4)
POTASSIUM SERPL-SCNC: 4.7 MMOL/L (ref 3.5–5)
PROTHROMBIN TIME: 12.4 SEC (ref 12–14.6)
RBC # BLD: 3.86 M/UL (ref 4.7–6.1)
SODIUM BLD-SCNC: 141 MMOL/L (ref 136–145)
WBC # BLD: 9.3 K/UL (ref 4.8–10.8)

## 2021-07-22 PROCEDURE — 85027 COMPLETE CBC AUTOMATED: CPT

## 2021-07-22 PROCEDURE — 85610 PROTHROMBIN TIME: CPT

## 2021-07-22 PROCEDURE — 86850 RBC ANTIBODY SCREEN: CPT

## 2021-07-22 PROCEDURE — 85730 THROMBOPLASTIN TIME PARTIAL: CPT

## 2021-07-22 PROCEDURE — 71046 X-RAY EXAM CHEST 2 VIEWS: CPT

## 2021-07-22 PROCEDURE — 80048 BASIC METABOLIC PNL TOTAL CA: CPT

## 2021-07-22 PROCEDURE — 93005 ELECTROCARDIOGRAM TRACING: CPT

## 2021-07-22 PROCEDURE — 86901 BLOOD TYPING SEROLOGIC RH(D): CPT

## 2021-07-22 PROCEDURE — 87081 CULTURE SCREEN ONLY: CPT

## 2021-07-22 PROCEDURE — 86900 BLOOD TYPING SEROLOGIC ABO: CPT

## 2021-07-22 NOTE — PROGRESS NOTES
Notified Bertha Dougherty at Dr. Chanell Jamil office per telephone of CXR results. Will repeat CXR day of surgery.

## 2021-07-22 NOTE — TELEPHONE ENCOUNTER
Muriel Garvey RN called to make me aware of the impression on the pt's CXR. I have discussed this with Tiffani Fu. Primitiov Ballard will reorder a CXR on arrival the day of the pt's surgery with a nipple marker to r/o nodule.

## 2021-07-23 LAB — MRSA CULTURE ONLY: NORMAL

## 2021-07-26 LAB
EKG P AXIS: 57 DEGREES
EKG P-R INTERVAL: 174 MS
EKG Q-T INTERVAL: 354 MS
EKG QRS DURATION: 82 MS
EKG QTC CALCULATION (BAZETT): 392 MS
EKG T AXIS: 60 DEGREES

## 2021-07-28 RX ORDER — SODIUM CHLORIDE 9 MG/ML
25 INJECTION, SOLUTION INTRAVENOUS PRN
Status: CANCELLED | OUTPATIENT
Start: 2021-07-28

## 2021-07-28 RX ORDER — ASPIRIN 81 MG/1
81 TABLET ORAL ONCE
Status: CANCELLED | OUTPATIENT
Start: 2021-07-28 | End: 2021-07-28

## 2021-07-28 RX ORDER — SODIUM CHLORIDE 0.9 % (FLUSH) 0.9 %
10 SYRINGE (ML) INJECTION PRN
Status: CANCELLED | OUTPATIENT
Start: 2021-07-28

## 2021-07-28 RX ORDER — SODIUM CHLORIDE 0.9 % (FLUSH) 0.9 %
10 SYRINGE (ML) INJECTION EVERY 12 HOURS SCHEDULED
Status: CANCELLED | OUTPATIENT
Start: 2021-07-28

## 2021-07-28 NOTE — OP NOTE
Preprocedure diagnosis:  1. Atherosclerosis of native arteries bilateral lower extremities with severe lifestyle limiting claudication  2. Asymptomatic stenosis right carotid artery status post right carotid endarterectomy 6/15/2021  3. Hypertension  4. Coronary artery disease    Post procedure diagnosis: Same    Procedure:  1. Ultrasound-guided cannulation left common femoral artery with 5 Armenian glide sheath #2 suprarenal abdominal aortogram with bilateral iliofemoral arteriogram and bilateral lower extremity arteriogram    Surgeon: Radha Shafer MD    Anesthesia: Intravenous/moderate sedation plus local    Estimated blood loss: Less than 50 mL    Findings:  1. The left renal artery is patent without any high-grade stenosis. I will see her renal artery on the right side. May be occluded. 2.  There is diffuse infrarenal abdominal aortic plaque but no obvious high-grade stenosis. 3.  There is moderate to severe stenosis of the right common iliac artery. The right external iliac artery is completely occluded approximately 2 cm distal to the takeoff of the patent internal iliac artery. The right common femoral artery is completely occluded. There is reconstitution of a profunda femoris artery. The right superficial femoral artery is completely occluded. There is reconstitution of a popliteal artery just above the knee. The right anterior tibial artery appears to be patent but the flow was not well seen into the foot, which may be secondary to the more proximal severe occlusions. The tibial peroneal trunk appears to be heavily diseased. The peroneal artery appears to be patent. The posterior tibial artery appears to be occluded. 3.  The left common iliac artery has 30 to 40% stenosis. The left external iliac artery has a 70 to 80% stenosis extending at least 3 to 4 cm. The left internal iliac artery is patent. The left common femoral artery is diseased but patent.   The left profunda femoris artery is patent. There is a flush occlusion of the left superficial femoral artery. It does reconstitute around the knee level in the popliteal.  Again, because the more proximal inflow occlusions, the runoff is not well seen but it does appear that the tibial arteries on the left side are patent at least down to the distal calf. Procedure in detail:    After the patient was consented and given intravenous antibiotics, he is brought to the angiography suite and placed on the table supine position. Intravenous/moderate sedation was administered and both groins were prepped and draped in usual sterile procedure. Skin and subcutaneous tissues in the left groin were anesthetized with lidocaine. Micropuncture needle was used to cannulate the left common femoral artery over the femoral head utilizing ultrasound guidance and fluoroscopic visualization. Seldinger technique was utilized place a 5 [place] glide sheath. Over an 035 wire, an Omni Flush cath was placed up to the suprarenal abdominal aorta. Suprarenal abdominal aortogram with bilateral iliofemoral arteriograms were performed with the above findings. The catheter was withdrawn to the distal abdominal aorta and distal abdominal aortogram with bilateral iliofemoral arteriograms were performed in both oblique views with the above findings. Finally, bilateral lower extremity arteriograms were performed with the above findings. The patient will need surgery and stents for this procedure (hybrid operating room). We will bring him back to the office to discuss surgical planning. The sheath was removed and pressure applied for hemostasis. Patient tolerated the procedure well. He is brought back to cath holding in good condition.

## 2021-07-28 NOTE — H&P (VIEW-ONLY)
Deana Hutton (:  1945) is a 70 y.o. male,Established patient, here for evaluation of the following chief complaint(s):  Post-Op Check            SUBJECTIVE/OBJECTIVE:  Bhupinder Rojas has a history of peripheral vascular disease of the lower extremities. He has had this for 1 - 5 years. Current treatment includes clopidogrel 75 mg po qd, ASA EC daily. Bhupinder Rojas has not had new wounds. Recently, he reports claudication at a distance of  100 feet. Bhupinder Rojas reports that the right leg is more signifcant than the left . He reports claudication is worsened and is mostly in the form of crampy type pain starting in the hips and calves. He has a short recovery time. This is reproducible in nature. He reports ischemic rest pain 0 times per night. He reports walking with cart does not help. He is post op from [address] [place] [address]. He has had no TIA, AF, or lateralizing weakness. No fever or chills. Deana Hutton is a 70 y.o. male with the following history as recorded in [facility]:  Patient Active Problem List    Diagnosis Date Noted    Asymptomatic stenosis of right carotid artery 06/15/2021     Current Outpatient Medications   Medication Sig Dispense Refill    rosuvastatin (CRESTOR) 10 MG tablet Take 1 tablet by mouth nightly 30 tablet 3    aspirin 81 MG EC tablet Take 81 mg by mouth daily      clopidogrel (PLAVIX) 75 MG tablet Take 1 tablet by mouth daily 30 tablet 3    lisinopril (PRINIVIL;ZESTRIL) 40 MG tablet Take 40 mg by mouth daily      HYDROCHLOROTHIAZIDE PO Take 25 mg by mouth daily       PARoxetine (PAXIL) 20 MG tablet Take 20 mg by mouth every morning       No current facility-administered medications for this visit. Allergies:  Other, Sulfa antibiotics, Beef-derived products, and Pork-derived products  Past Medical History:   Diagnosis Date    Allergy to alpha-gal     started in the     Carotid arterial disease ([address]     [facility]cere[facility]     loss of balance    Depression     Hypertension      Past Surgical History:   Procedure Laterality Date    CAROTID ENDARTERECTOMY Right 6/15/2021    RIGHT CAROTID ENDARTERECTOMY WITH COMPLETION ANGIOGRAM performed by Chaparro King MD at [place] [address] Bilateral 2000 [facility]     Family History   Problem Relation Age of Onset    Breast Cancer Mother      Social History     Tobacco Use    Smoking status: Current Every Day Smoker     Packs/day: 1.00     Years: 55.00     Pack years: 55.00     Types: Cigars     Start date: 26    Smokeless tobacco: Never Used    Tobacco comment: 1 cigar per day    Substance Use Topics    Alcohol use: Yes     Alcohol/week: 12.0 standard drinks     Types: 12 Cans of beer per week       ROS  Eyes - no sudden vision change or amaurosis. Respiratory - no significant shortness of breath,  Cardiovascular - no chest pain or syncope. No  significant leg swelling. No claudication. Musculoskeletal - no gait disturbance  Skin - no new wound. Neurologic -  No speech difficulty or lateralizing weakness. All other review of systems are negative. Physical Exam    BP (!) 155/84 (Site: Right Upper Arm, Position: Sitting, Cuff Size: Large Adult)   Pulse 98   Temp 98 °F (36.7 °C) (Temporal)   SpO2 93%       Neck- carotid pulses 2+ to palpation with no bruit; incision looks great  Cardiovascular - Regular rate and rhythm. Pulmonary - effort appears normal.  No respiratory distress. Lungs - Breath sounds normal. No wheezes or rales. Extremities -  - Radial and brachial pulses are 2+ to palpation bilaterally. Right femoral pulse: absent; Right popliteal pulse: absent Right DP: absent; Right PT absent; Left femoral pulse: present 2+; Left popliteal pulse: absent; Left DP: absent; Left PT: absent No cyanosis, clubbing, or significant edema. No signs atheroembolic event. Neurologic - alert and oriented X 3. Physiologic. Face symmetric. Skin - warm, dry, and intact.   no

## 2021-07-28 NOTE — H&P
Chuck Andrea (:  1945) is a 70 y.o. male,Established patient, here for evaluation of the following chief complaint(s):  Post-Op Check            SUBJECTIVE/OBJECTIVE:  Rubén Brown has a history of peripheral vascular disease of the lower extremities. He has had this for 1 - 5 years. Current treatment includes clopidogrel 75 mg po qd, ASA EC daily. Rubén Brown has not had new wounds. Recently, he reports claudication at a distance of  100 feet. Rubén Brown reports that the right leg is more signifcant than the left . He reports claudication is worsened and is mostly in the form of crampy type pain starting in the hips and calves. He has a short recovery time. This is reproducible in nature. He reports ischemic rest pain 0 times per night. He reports walking with cart does not help. He is post op from [address] [place] [address]. He has had no TIA, AF, or lateralizing weakness. No fever or chills. Chuck Andrea is a 70 y.o. male with the following history as recorded in [facility]:  Patient Active Problem List    Diagnosis Date Noted    Asymptomatic stenosis of right carotid artery 06/15/2021     Current Outpatient Medications   Medication Sig Dispense Refill    rosuvastatin (CRESTOR) 10 MG tablet Take 1 tablet by mouth nightly 30 tablet 3    aspirin 81 MG EC tablet Take 81 mg by mouth daily      clopidogrel (PLAVIX) 75 MG tablet Take 1 tablet by mouth daily 30 tablet 3    lisinopril (PRINIVIL;ZESTRIL) 40 MG tablet Take 40 mg by mouth daily      HYDROCHLOROTHIAZIDE PO Take 25 mg by mouth daily       PARoxetine (PAXIL) 20 MG tablet Take 20 mg by mouth every morning       No current facility-administered medications for this visit. Allergies:  Other, Sulfa antibiotics, Beef-derived products, and Pork-derived products  Past Medical History:   Diagnosis Date    Allergy to alpha-gal     started in the s    Carotid arterial disease ([address]     CVA [facility]cerebral vascular accident) [facility]     loss of balance    Depression     Hypertension      Past Surgical History:   Procedure Laterality Date    CAROTID ENDARTERECTOMY Right 6/15/2021    RIGHT CAROTID ENDARTERECTOMY WITH COMPLETION ANGIOGRAM performed by Chaparro Webb MD at [address] Bilateral 2000   [facility]     Family History   Problem Relation Age of Onset    Breast Cancer Mother      Social History     Tobacco Use    Smoking status: Current Every Day Smoker     Packs/day: 1.00     Years: 55.00     Pack years: 55.00     Types: Cigars     Start date: 26    Smokeless tobacco: Never Used    Tobacco comment: 1 cigar per day    Substance Use Topics    Alcohol use: Yes     Alcohol/week: 12.0 standard drinks     Types: 12 Cans of beer per week       ROS  Eyes - no sudden vision change or amaurosis. Respiratory - no significant shortness of breath,  Cardiovascular - no chest pain or syncope. No  significant leg swelling. No claudication. Musculoskeletal - no gait disturbance  Skin - no new wound. Neurologic -  No speech difficulty or lateralizing weakness. All other review of systems are negative. Physical Exam    BP (!) 155/84 (Site: Right Upper Arm, Position: Sitting, Cuff Size: Large Adult)   Pulse 98   Temp 98 °F (36.7 °C) (Temporal)   SpO2 93%       Neck- carotid pulses 2+ to palpation with no bruit; incision looks great  Cardiovascular - Regular rate and rhythm. Pulmonary - effort appears normal.  No respiratory distress. Lungs - Breath sounds normal. No wheezes or rales. Extremities -  - Radial and brachial pulses are 2+ to palpation bilaterally. Right femoral pulse: absent; Right popliteal pulse: absent Right DP: absent; Right PT absent; Left femoral pulse: present 2+; Left popliteal pulse: absent; Left DP: absent; Left PT: absent No cyanosis, clubbing, or significant edema. No signs atheroembolic event. Neurologic - alert and oriented X 3. Physiologic. Face symmetric. Skin - warm, dry, and intact.   no wound  Psychiatric - mood, affect, and behavior appear normal.  Judgment and thought processes appear normal.    Risk factors for atherosclerosis of all vascular beds have been reviewed with the patient including:  Family history, tobacco abuse in all forms, elevated cholesterol, hyperlipidemia, and diabetes. Lower extremity arterial study: Right RON 0.39, Left RON 0.55. Individual films reviewed: Yes. These results were reviewed with the patient. Disease process is chronic illness with severe exacerbation/progression          Options have been discussed with the patient including continued medical management vs. proceeding with angiogram with runoff and possible angioplasty/atherectomy/stent  . Patient has opted to proceed with this. Risks have been discussed with the patient including but not limited to MI, death, CVA, bleed, nerve injury, and infection. ASSESSMENT/PLAN:  1. Atheroscler of native artery of both legs with intermit claudication ([address]  2. Bilateral carotid artery stenosis    1. Atheroscler of native artery of both legs with intermit claudication ([address]    2. Bilateral carotid artery stenosis               Aortogram with bilateral lower arteriogram was performed and showed right external iliac, common femoral and superficial femoral artery chronic total Occlusion. Options have been discussed with the patient including continued medical management vs. proceeding with right cfa end, intraoperative angiogram with runoff and possible angioplasty/atherectomy/stent with possible iliac stent, possible fem fem bypass. Patient has opted to proceed with this. Risks have been discussed with the patient including but not limited to MI, death, CVA, bleed, nerve injury, and infection.

## 2021-07-30 ENCOUNTER — APPOINTMENT (OUTPATIENT)
Dept: INTERVENTIONAL RADIOLOGY/VASCULAR | Age: 76
DRG: 271 | End: 2021-07-30
Attending: SURGERY
Payer: MEDICARE

## 2021-07-30 ENCOUNTER — HOSPITAL ENCOUNTER (INPATIENT)
Age: 76
LOS: 2 days | Discharge: HOME | DRG: 271 | End: 2021-08-01
Attending: SURGERY | Admitting: SURGERY
Payer: MEDICARE

## 2021-07-30 ENCOUNTER — ANESTHESIA (OUTPATIENT)
Dept: OPERATING ROOM | Age: 76
DRG: 271 | End: 2021-07-30
Payer: MEDICARE

## 2021-07-30 ENCOUNTER — ANESTHESIA EVENT (OUTPATIENT)
Dept: OPERATING ROOM | Age: 76
DRG: 271 | End: 2021-07-30
Payer: MEDICARE

## 2021-07-30 VITALS — OXYGEN SATURATION: 100 % | TEMPERATURE: 98.6 F | SYSTOLIC BLOOD PRESSURE: 162 MMHG | DIASTOLIC BLOOD PRESSURE: 71 MMHG

## 2021-07-30 DIAGNOSIS — G89.18 POSTOPERATIVE PAIN: ICD-10-CM

## 2021-07-30 DIAGNOSIS — I70.219 ATHEROSCLEROSIS WITH CLAUDICATION OF EXTREMITY (HCC): Primary | ICD-10-CM

## 2021-07-30 LAB
ABO/RH: NORMAL
ANTIBODY SCREEN: NORMAL

## 2021-07-30 PROCEDURE — 3700000000 HC ANESTHESIA ATTENDED CARE: Performed by: SURGERY

## 2021-07-30 PROCEDURE — 2780000010 HC IMPLANT OTHER: Performed by: SURGERY

## 2021-07-30 PROCEDURE — 2500000003 HC RX 250 WO HCPCS: Performed by: NURSE ANESTHETIST, CERTIFIED REGISTERED

## 2021-07-30 PROCEDURE — 2709999900 HC NON-CHARGEABLE SUPPLY: Performed by: SURGERY

## 2021-07-30 PROCEDURE — 6370000000 HC RX 637 (ALT 250 FOR IP): Performed by: SURGERY

## 2021-07-30 PROCEDURE — 37223 PR REVSC OPN/PRQ ILIAC ART W/STNT & ANGIOP IPSILATL: CPT | Performed by: SURGERY

## 2021-07-30 PROCEDURE — C1887 CATHETER, GUIDING: HCPCS | Performed by: SURGERY

## 2021-07-30 PROCEDURE — 047H3EZ DILATION OF RIGHT EXTERNAL ILIAC ARTERY WITH TWO INTRALUMINAL DEVICES, PERCUTANEOUS APPROACH: ICD-10-PCS | Performed by: SURGERY

## 2021-07-30 PROCEDURE — 2580000003 HC RX 258: Performed by: SURGERY

## 2021-07-30 PROCEDURE — 047C3EZ DILATION OF RIGHT COMMON ILIAC ARTERY WITH TWO INTRALUMINAL DEVICES, PERCUTANEOUS APPROACH: ICD-10-PCS | Performed by: SURGERY

## 2021-07-30 PROCEDURE — 047J3DZ DILATION OF LEFT EXTERNAL ILIAC ARTERY WITH INTRALUMINAL DEVICE, PERCUTANEOUS APPROACH: ICD-10-PCS | Performed by: SURGERY

## 2021-07-30 PROCEDURE — 6360000002 HC RX W HCPCS: Performed by: NURSE PRACTITIONER

## 2021-07-30 PROCEDURE — 86900 BLOOD TYPING SEROLOGIC ABO: CPT

## 2021-07-30 PROCEDURE — 04UH07Z SUPPLEMENT RIGHT EXTERNAL ILIAC ARTERY WITH AUTOLOGOUS TISSUE SUBSTITUTE, OPEN APPROACH: ICD-10-PCS | Performed by: SURGERY

## 2021-07-30 PROCEDURE — 6360000002 HC RX W HCPCS: Performed by: SURGERY

## 2021-07-30 PROCEDURE — 04CH0ZZ EXTIRPATION OF MATTER FROM RIGHT EXTERNAL ILIAC ARTERY, OPEN APPROACH: ICD-10-PCS | Performed by: SURGERY

## 2021-07-30 PROCEDURE — 75716 ARTERY X-RAYS ARMS/LEGS: CPT | Performed by: SURGERY

## 2021-07-30 PROCEDURE — C1874 STENT, COATED/COV W/DEL SYS: HCPCS | Performed by: SURGERY

## 2021-07-30 PROCEDURE — 6360000002 HC RX W HCPCS: Performed by: ANESTHESIOLOGY

## 2021-07-30 PROCEDURE — C1769 GUIDE WIRE: HCPCS | Performed by: SURGERY

## 2021-07-30 PROCEDURE — 2580000003 HC RX 258: Performed by: NURSE ANESTHETIST, CERTIFIED REGISTERED

## 2021-07-30 PROCEDURE — 37221 PR REVSC OPN/PRQ ILIAC ART W/STNT PLMT & ANGIOPLSTY: CPT | Performed by: SURGERY

## 2021-07-30 PROCEDURE — 3600000005 HC SURGERY LEVEL 5 BASE: Performed by: SURGERY

## 2021-07-30 PROCEDURE — 2500000003 HC RX 250 WO HCPCS: Performed by: SURGERY

## 2021-07-30 PROCEDURE — 6360000002 HC RX W HCPCS: Performed by: NURSE ANESTHETIST, CERTIFIED REGISTERED

## 2021-07-30 PROCEDURE — 86850 RBC ANTIBODY SCREEN: CPT

## 2021-07-30 PROCEDURE — C1725 CATH, TRANSLUMIN NON-LASER: HCPCS | Performed by: SURGERY

## 2021-07-30 PROCEDURE — 3700000001 HC ADD 15 MINUTES (ANESTHESIA): Performed by: SURGERY

## 2021-07-30 PROCEDURE — C1760 CLOSURE DEV, VASC: HCPCS | Performed by: SURGERY

## 2021-07-30 PROCEDURE — C1876 STENT, NON-COA/NON-COV W/DEL: HCPCS | Performed by: SURGERY

## 2021-07-30 PROCEDURE — 1210000000 HC MED SURG R&B

## 2021-07-30 PROCEDURE — 35351 RECHANNELING OF ARTERY: CPT | Performed by: SURGERY

## 2021-07-30 PROCEDURE — 36415 COLL VENOUS BLD VENIPUNCTURE: CPT

## 2021-07-30 PROCEDURE — 7100000001 HC PACU RECOVERY - ADDTL 15 MIN: Performed by: SURGERY

## 2021-07-30 PROCEDURE — 3600000015 HC SURGERY LEVEL 5 ADDTL 15MIN: Performed by: SURGERY

## 2021-07-30 PROCEDURE — 88311 DECALCIFY TISSUE: CPT

## 2021-07-30 PROCEDURE — 86901 BLOOD TYPING SEROLOGIC RH(D): CPT

## 2021-07-30 PROCEDURE — 35372 RECHANNELING OF ARTERY: CPT | Performed by: SURGERY

## 2021-07-30 PROCEDURE — 7100000000 HC PACU RECOVERY - FIRST 15 MIN: Performed by: SURGERY

## 2021-07-30 PROCEDURE — 88304 TISSUE EXAM BY PATHOLOGIST: CPT

## 2021-07-30 PROCEDURE — C1894 INTRO/SHEATH, NON-LASER: HCPCS | Performed by: SURGERY

## 2021-07-30 PROCEDURE — 6360000002 HC RX W HCPCS

## 2021-07-30 PROCEDURE — B41D1ZZ FLUOROSCOPY OF AORTA AND BILATERAL LOWER EXTREMITY ARTERIES USING LOW OSMOLAR CONTRAST: ICD-10-PCS | Performed by: SURGERY

## 2021-07-30 DEVICE — SELF-EXPANDING STENT SYSTEM
Type: IMPLANTABLE DEVICE | Status: FUNCTIONAL
Brand: EPIC™ VASCULAR

## 2021-07-30 DEVICE — PREMOUNTED STENT SYSTEM
Type: IMPLANTABLE DEVICE | Status: FUNCTIONAL
Brand: EXPRESS® LD ILIAC / BILIARY

## 2021-07-30 DEVICE — STENT TRACHBRONCH L38MM DIA10MM CATH 7FR L120CM S STL PTFE: Type: IMPLANTABLE DEVICE | Status: FUNCTIONAL

## 2021-07-30 RX ORDER — LIDOCAINE HYDROCHLORIDE 10 MG/ML
1 INJECTION, SOLUTION EPIDURAL; INFILTRATION; INTRACAUDAL; PERINEURAL
Status: DISCONTINUED | OUTPATIENT
Start: 2021-07-30 | End: 2021-07-30 | Stop reason: HOSPADM

## 2021-07-30 RX ORDER — HYDROMORPHONE HYDROCHLORIDE 1 MG/ML
0.25 INJECTION, SOLUTION INTRAMUSCULAR; INTRAVENOUS; SUBCUTANEOUS
Status: DISCONTINUED | OUTPATIENT
Start: 2021-07-30 | End: 2021-08-01 | Stop reason: HOSPADM

## 2021-07-30 RX ORDER — SODIUM CHLORIDE 9 MG/ML
25 INJECTION, SOLUTION INTRAVENOUS PRN
Status: DISCONTINUED | OUTPATIENT
Start: 2021-07-30 | End: 2021-08-01 | Stop reason: HOSPADM

## 2021-07-30 RX ORDER — FENTANYL CITRATE 50 UG/ML
INJECTION, SOLUTION INTRAMUSCULAR; INTRAVENOUS PRN
Status: DISCONTINUED | OUTPATIENT
Start: 2021-07-30 | End: 2021-07-30 | Stop reason: SDUPTHER

## 2021-07-30 RX ORDER — SODIUM CHLORIDE 0.9 % (FLUSH) 0.9 %
10 SYRINGE (ML) INJECTION PRN
Status: DISCONTINUED | OUTPATIENT
Start: 2021-07-30 | End: 2021-07-30 | Stop reason: HOSPADM

## 2021-07-30 RX ORDER — SODIUM CHLORIDE 9 MG/ML
25 INJECTION, SOLUTION INTRAVENOUS PRN
Status: DISCONTINUED | OUTPATIENT
Start: 2021-07-30 | End: 2021-07-30 | Stop reason: HOSPADM

## 2021-07-30 RX ORDER — ROSUVASTATIN CALCIUM 10 MG/1
10 TABLET, COATED ORAL NIGHTLY
Status: DISCONTINUED | OUTPATIENT
Start: 2021-07-30 | End: 2021-08-01 | Stop reason: HOSPADM

## 2021-07-30 RX ORDER — HYDROCHLOROTHIAZIDE 25 MG/1
25 TABLET ORAL DAILY
Status: DISCONTINUED | OUTPATIENT
Start: 2021-07-30 | End: 2021-08-01 | Stop reason: HOSPADM

## 2021-07-30 RX ORDER — SODIUM CHLORIDE 0.9 % (FLUSH) 0.9 %
5-40 SYRINGE (ML) INJECTION EVERY 12 HOURS SCHEDULED
Status: DISCONTINUED | OUTPATIENT
Start: 2021-07-30 | End: 2021-07-30 | Stop reason: HOSPADM

## 2021-07-30 RX ORDER — METHYLPREDNISOLONE SODIUM SUCCINATE 125 MG/2ML
INJECTION, POWDER, LYOPHILIZED, FOR SOLUTION INTRAMUSCULAR; INTRAVENOUS
Status: COMPLETED
Start: 2021-07-30 | End: 2021-07-30

## 2021-07-30 RX ORDER — ONDANSETRON 2 MG/ML
4 INJECTION INTRAMUSCULAR; INTRAVENOUS EVERY 6 HOURS PRN
Status: DISCONTINUED | OUTPATIENT
Start: 2021-07-30 | End: 2021-08-01 | Stop reason: HOSPADM

## 2021-07-30 RX ORDER — METHYLPREDNISOLONE SODIUM SUCCINATE 125 MG/2ML
250 INJECTION, POWDER, LYOPHILIZED, FOR SOLUTION INTRAMUSCULAR; INTRAVENOUS ONCE
Status: COMPLETED | OUTPATIENT
Start: 2021-07-30 | End: 2021-07-30

## 2021-07-30 RX ORDER — HEPARIN SODIUM 1000 [USP'U]/ML
INJECTION, SOLUTION INTRAVENOUS; SUBCUTANEOUS PRN
Status: DISCONTINUED | OUTPATIENT
Start: 2021-07-30 | End: 2021-07-30 | Stop reason: SDUPTHER

## 2021-07-30 RX ORDER — M-VIT,TX,IRON,MINS/CALC/FOLIC 27MG-0.4MG
1 TABLET ORAL DAILY
COMMUNITY

## 2021-07-30 RX ORDER — SODIUM CHLORIDE 0.9 % (FLUSH) 0.9 %
5-40 SYRINGE (ML) INJECTION PRN
Status: DISCONTINUED | OUTPATIENT
Start: 2021-07-30 | End: 2021-08-01 | Stop reason: HOSPADM

## 2021-07-30 RX ORDER — PROMETHAZINE HYDROCHLORIDE 25 MG/ML
6.25 INJECTION, SOLUTION INTRAMUSCULAR; INTRAVENOUS
Status: DISCONTINUED | OUTPATIENT
Start: 2021-07-30 | End: 2021-07-30 | Stop reason: HOSPADM

## 2021-07-30 RX ORDER — SODIUM CHLORIDE 0.9 % (FLUSH) 0.9 %
5-40 SYRINGE (ML) INJECTION PRN
Status: DISCONTINUED | OUTPATIENT
Start: 2021-07-30 | End: 2021-07-30 | Stop reason: HOSPADM

## 2021-07-30 RX ORDER — PROTAMINE SULFATE 10 MG/ML
INJECTION, SOLUTION INTRAVENOUS PRN
Status: DISCONTINUED | OUTPATIENT
Start: 2021-07-30 | End: 2021-07-30 | Stop reason: SDUPTHER

## 2021-07-30 RX ORDER — MORPHINE SULFATE 4 MG/ML
4 INJECTION, SOLUTION INTRAMUSCULAR; INTRAVENOUS EVERY 5 MIN PRN
Status: DISCONTINUED | OUTPATIENT
Start: 2021-07-30 | End: 2021-07-30 | Stop reason: HOSPADM

## 2021-07-30 RX ORDER — SODIUM CHLORIDE 0.9 % (FLUSH) 0.9 %
10 SYRINGE (ML) INJECTION EVERY 12 HOURS SCHEDULED
Status: DISCONTINUED | OUTPATIENT
Start: 2021-07-30 | End: 2021-07-30 | Stop reason: HOSPADM

## 2021-07-30 RX ORDER — OXYCODONE HYDROCHLORIDE AND ACETAMINOPHEN 5; 325 MG/1; MG/1
1 TABLET ORAL EVERY 6 HOURS PRN
Status: DISCONTINUED | OUTPATIENT
Start: 2021-07-30 | End: 2021-08-01 | Stop reason: HOSPADM

## 2021-07-30 RX ORDER — FENTANYL CITRATE 50 UG/ML
50 INJECTION, SOLUTION INTRAMUSCULAR; INTRAVENOUS
Status: DISCONTINUED | OUTPATIENT
Start: 2021-07-30 | End: 2021-07-30 | Stop reason: HOSPADM

## 2021-07-30 RX ORDER — ONDANSETRON 4 MG/1
4 TABLET, ORALLY DISINTEGRATING ORAL EVERY 8 HOURS PRN
Status: DISCONTINUED | OUTPATIENT
Start: 2021-07-30 | End: 2021-08-01 | Stop reason: HOSPADM

## 2021-07-30 RX ORDER — HYDRALAZINE HYDROCHLORIDE 20 MG/ML
5 INJECTION INTRAMUSCULAR; INTRAVENOUS EVERY 10 MIN PRN
Status: DISCONTINUED | OUTPATIENT
Start: 2021-07-30 | End: 2021-07-30 | Stop reason: HOSPADM

## 2021-07-30 RX ORDER — SODIUM CHLORIDE 9 MG/ML
INJECTION, SOLUTION INTRAVENOUS CONTINUOUS
Status: ACTIVE | OUTPATIENT
Start: 2021-07-30 | End: 2021-07-30

## 2021-07-30 RX ORDER — MIDAZOLAM HYDROCHLORIDE 1 MG/ML
2 INJECTION INTRAMUSCULAR; INTRAVENOUS ONCE
Status: COMPLETED | OUTPATIENT
Start: 2021-07-30 | End: 2021-07-30

## 2021-07-30 RX ORDER — HYDROMORPHONE HYDROCHLORIDE 1 MG/ML
0.25 INJECTION, SOLUTION INTRAMUSCULAR; INTRAVENOUS; SUBCUTANEOUS EVERY 5 MIN PRN
Status: DISCONTINUED | OUTPATIENT
Start: 2021-07-30 | End: 2021-07-30 | Stop reason: HOSPADM

## 2021-07-30 RX ORDER — SODIUM CHLORIDE, SODIUM LACTATE, POTASSIUM CHLORIDE, CALCIUM CHLORIDE 600; 310; 30; 20 MG/100ML; MG/100ML; MG/100ML; MG/100ML
INJECTION, SOLUTION INTRAVENOUS CONTINUOUS
Status: DISCONTINUED | OUTPATIENT
Start: 2021-07-30 | End: 2021-07-30

## 2021-07-30 RX ORDER — SODIUM CHLORIDE 9 MG/ML
INJECTION, SOLUTION INTRAVENOUS CONTINUOUS
Status: DISCONTINUED | OUTPATIENT
Start: 2021-07-30 | End: 2021-07-30

## 2021-07-30 RX ORDER — SODIUM CHLORIDE 0.9 % (FLUSH) 0.9 %
5-40 SYRINGE (ML) INJECTION EVERY 12 HOURS SCHEDULED
Status: DISCONTINUED | OUTPATIENT
Start: 2021-07-30 | End: 2021-08-01 | Stop reason: HOSPADM

## 2021-07-30 RX ORDER — METOCLOPRAMIDE HYDROCHLORIDE 5 MG/ML
10 INJECTION INTRAMUSCULAR; INTRAVENOUS
Status: DISCONTINUED | OUTPATIENT
Start: 2021-07-30 | End: 2021-07-30 | Stop reason: HOSPADM

## 2021-07-30 RX ORDER — LIDOCAINE HYDROCHLORIDE 10 MG/ML
INJECTION, SOLUTION EPIDURAL; INFILTRATION; INTRACAUDAL; PERINEURAL PRN
Status: DISCONTINUED | OUTPATIENT
Start: 2021-07-30 | End: 2021-07-30 | Stop reason: SDUPTHER

## 2021-07-30 RX ORDER — ENALAPRILAT 2.5 MG/2ML
1.25 INJECTION INTRAVENOUS
Status: DISCONTINUED | OUTPATIENT
Start: 2021-07-30 | End: 2021-07-30 | Stop reason: HOSPADM

## 2021-07-30 RX ORDER — HYDROMORPHONE HYDROCHLORIDE 1 MG/ML
0.5 INJECTION, SOLUTION INTRAMUSCULAR; INTRAVENOUS; SUBCUTANEOUS
Status: DISCONTINUED | OUTPATIENT
Start: 2021-07-30 | End: 2021-08-01 | Stop reason: HOSPADM

## 2021-07-30 RX ORDER — HYDROMORPHONE HYDROCHLORIDE 1 MG/ML
0.5 INJECTION, SOLUTION INTRAMUSCULAR; INTRAVENOUS; SUBCUTANEOUS EVERY 5 MIN PRN
Status: DISCONTINUED | OUTPATIENT
Start: 2021-07-30 | End: 2021-07-30 | Stop reason: HOSPADM

## 2021-07-30 RX ORDER — PROPOFOL 10 MG/ML
INJECTION, EMULSION INTRAVENOUS PRN
Status: DISCONTINUED | OUTPATIENT
Start: 2021-07-30 | End: 2021-07-30 | Stop reason: SDUPTHER

## 2021-07-30 RX ORDER — M-VIT,TX,IRON,MINS/CALC/FOLIC 27MG-0.4MG
1 TABLET ORAL DAILY
Status: DISCONTINUED | OUTPATIENT
Start: 2021-07-30 | End: 2021-08-01 | Stop reason: HOSPADM

## 2021-07-30 RX ORDER — MIDAZOLAM HYDROCHLORIDE 1 MG/ML
2 INJECTION INTRAMUSCULAR; INTRAVENOUS
Status: DISCONTINUED | OUTPATIENT
Start: 2021-07-30 | End: 2021-07-30 | Stop reason: HOSPADM

## 2021-07-30 RX ORDER — DEXAMETHASONE SODIUM PHOSPHATE 10 MG/ML
INJECTION, SOLUTION INTRAMUSCULAR; INTRAVENOUS PRN
Status: DISCONTINUED | OUTPATIENT
Start: 2021-07-30 | End: 2021-07-30 | Stop reason: SDUPTHER

## 2021-07-30 RX ORDER — FENTANYL CITRATE 50 UG/ML
25 INJECTION, SOLUTION INTRAMUSCULAR; INTRAVENOUS
Status: DISCONTINUED | OUTPATIENT
Start: 2021-07-30 | End: 2021-07-30 | Stop reason: HOSPADM

## 2021-07-30 RX ORDER — PAROXETINE HYDROCHLORIDE 20 MG/1
20 TABLET, FILM COATED ORAL EVERY MORNING
Status: DISCONTINUED | OUTPATIENT
Start: 2021-07-31 | End: 2021-08-01 | Stop reason: HOSPADM

## 2021-07-30 RX ORDER — LABETALOL HYDROCHLORIDE 5 MG/ML
5 INJECTION, SOLUTION INTRAVENOUS EVERY 10 MIN PRN
Status: DISCONTINUED | OUTPATIENT
Start: 2021-07-30 | End: 2021-07-30 | Stop reason: HOSPADM

## 2021-07-30 RX ORDER — DIPHENHYDRAMINE HYDROCHLORIDE 50 MG/ML
50 INJECTION INTRAMUSCULAR; INTRAVENOUS ONCE
Status: COMPLETED | OUTPATIENT
Start: 2021-07-30 | End: 2021-07-30

## 2021-07-30 RX ORDER — ONDANSETRON 2 MG/ML
INJECTION INTRAMUSCULAR; INTRAVENOUS PRN
Status: DISCONTINUED | OUTPATIENT
Start: 2021-07-30 | End: 2021-07-30 | Stop reason: SDUPTHER

## 2021-07-30 RX ORDER — CLOPIDOGREL BISULFATE 75 MG/1
75 TABLET ORAL DAILY
Qty: 30 TABLET | Refills: 5 | Status: SHIPPED | OUTPATIENT
Start: 2021-07-30 | End: 2022-03-08

## 2021-07-30 RX ORDER — LISINOPRIL 20 MG/1
40 TABLET ORAL DAILY
Status: DISCONTINUED | OUTPATIENT
Start: 2021-07-30 | End: 2021-08-01 | Stop reason: HOSPADM

## 2021-07-30 RX ORDER — SODIUM CHLORIDE, SODIUM LACTATE, POTASSIUM CHLORIDE, CALCIUM CHLORIDE 600; 310; 30; 20 MG/100ML; MG/100ML; MG/100ML; MG/100ML
INJECTION, SOLUTION INTRAVENOUS CONTINUOUS PRN
Status: DISCONTINUED | OUTPATIENT
Start: 2021-07-30 | End: 2021-07-30 | Stop reason: SDUPTHER

## 2021-07-30 RX ORDER — OXYCODONE HYDROCHLORIDE AND ACETAMINOPHEN 5; 325 MG/1; MG/1
1 TABLET ORAL EVERY 6 HOURS PRN
Qty: 40 TABLET | Refills: 0 | Status: SHIPPED | OUTPATIENT
Start: 2021-07-30 | End: 2021-08-09

## 2021-07-30 RX ORDER — MORPHINE SULFATE 4 MG/ML
2 INJECTION, SOLUTION INTRAMUSCULAR; INTRAVENOUS EVERY 5 MIN PRN
Status: DISCONTINUED | OUTPATIENT
Start: 2021-07-30 | End: 2021-07-30 | Stop reason: HOSPADM

## 2021-07-30 RX ORDER — ASPIRIN 81 MG/1
81 TABLET ORAL DAILY
Status: DISCONTINUED | OUTPATIENT
Start: 2021-07-30 | End: 2021-08-01 | Stop reason: HOSPADM

## 2021-07-30 RX ORDER — DIPHENHYDRAMINE HYDROCHLORIDE 50 MG/ML
12.5 INJECTION INTRAMUSCULAR; INTRAVENOUS
Status: DISCONTINUED | OUTPATIENT
Start: 2021-07-30 | End: 2021-07-30 | Stop reason: HOSPADM

## 2021-07-30 RX ORDER — MEPERIDINE HYDROCHLORIDE 50 MG/ML
12.5 INJECTION INTRAMUSCULAR; INTRAVENOUS; SUBCUTANEOUS EVERY 5 MIN PRN
Status: DISCONTINUED | OUTPATIENT
Start: 2021-07-30 | End: 2021-07-30 | Stop reason: HOSPADM

## 2021-07-30 RX ORDER — ROCURONIUM BROMIDE 10 MG/ML
INJECTION, SOLUTION INTRAVENOUS PRN
Status: DISCONTINUED | OUTPATIENT
Start: 2021-07-30 | End: 2021-07-30 | Stop reason: SDUPTHER

## 2021-07-30 RX ORDER — ASPIRIN 81 MG/1
81 TABLET ORAL ONCE
Status: DISCONTINUED | OUTPATIENT
Start: 2021-07-30 | End: 2021-07-30 | Stop reason: HOSPADM

## 2021-07-30 RX ORDER — CLONIDINE HYDROCHLORIDE 0.1 MG/1
0.1 TABLET ORAL
Status: DISCONTINUED | OUTPATIENT
Start: 2021-07-30 | End: 2021-08-01 | Stop reason: HOSPADM

## 2021-07-30 RX ORDER — CLOPIDOGREL BISULFATE 75 MG/1
75 TABLET ORAL DAILY
Status: DISCONTINUED | OUTPATIENT
Start: 2021-07-30 | End: 2021-08-01 | Stop reason: HOSPADM

## 2021-07-30 RX ADMIN — HYDROMORPHONE HYDROCHLORIDE 0.5 MG: 1 INJECTION, SOLUTION INTRAMUSCULAR; INTRAVENOUS; SUBCUTANEOUS at 20:20

## 2021-07-30 RX ADMIN — PROTAMINE SULFATE 20 MG: 10 INJECTION, SOLUTION INTRAVENOUS at 11:07

## 2021-07-30 RX ADMIN — PHENYLEPHRINE HYDROCHLORIDE 160 MCG: 10 INJECTION INTRAVENOUS at 11:31

## 2021-07-30 RX ADMIN — DEXAMETHASONE SODIUM PHOSPHATE 4 MG: 10 INJECTION, SOLUTION INTRAMUSCULAR; INTRAVENOUS at 07:57

## 2021-07-30 RX ADMIN — Medication 2000 MG: at 07:45

## 2021-07-30 RX ADMIN — LIDOCAINE HYDROCHLORIDE 50 MG: 10 INJECTION, SOLUTION EPIDURAL; INFILTRATION; INTRACAUDAL; PERINEURAL at 07:38

## 2021-07-30 RX ADMIN — METHYLPREDNISOLONE SODIUM SUCCINATE 250 MG: 125 INJECTION, POWDER, FOR SOLUTION INTRAMUSCULAR; INTRAVENOUS at 07:21

## 2021-07-30 RX ADMIN — METHYLPREDNISOLONE SODIUM SUCCINATE 250 MG: 125 INJECTION, POWDER, LYOPHILIZED, FOR SOLUTION INTRAMUSCULAR; INTRAVENOUS at 07:21

## 2021-07-30 RX ADMIN — Medication 2000 MG: at 15:27

## 2021-07-30 RX ADMIN — PROTAMINE SULFATE 20 MG: 10 INJECTION, SOLUTION INTRAVENOUS at 11:18

## 2021-07-30 RX ADMIN — PHENYLEPHRINE HYDROCHLORIDE 80 MCG: 10 INJECTION INTRAVENOUS at 11:34

## 2021-07-30 RX ADMIN — PHENYLEPHRINE HYDROCHLORIDE 160 MCG: 10 INJECTION INTRAVENOUS at 07:47

## 2021-07-30 RX ADMIN — PHENYLEPHRINE HYDROCHLORIDE 160 MCG: 10 INJECTION INTRAVENOUS at 08:44

## 2021-07-30 RX ADMIN — PHENYLEPHRINE HYDROCHLORIDE 100 MCG/MIN: 10 INJECTION INTRAVENOUS at 07:50

## 2021-07-30 RX ADMIN — SUGAMMADEX 200 MG: 100 INJECTION, SOLUTION INTRAVENOUS at 11:29

## 2021-07-30 RX ADMIN — HYDROMORPHONE HYDROCHLORIDE 0.25 MG: 1 INJECTION, SOLUTION INTRAMUSCULAR; INTRAVENOUS; SUBCUTANEOUS at 23:42

## 2021-07-30 RX ADMIN — SODIUM CHLORIDE, SODIUM LACTATE, POTASSIUM CHLORIDE, AND CALCIUM CHLORIDE: 600; 310; 30; 20 INJECTION, SOLUTION INTRAVENOUS at 07:30

## 2021-07-30 RX ADMIN — SODIUM CHLORIDE: 9 INJECTION, SOLUTION INTRAVENOUS at 13:52

## 2021-07-30 RX ADMIN — PHENYLEPHRINE HYDROCHLORIDE 160 MCG: 10 INJECTION INTRAVENOUS at 07:44

## 2021-07-30 RX ADMIN — DIPHENHYDRAMINE HYDROCHLORIDE 50 MG: 50 INJECTION, SOLUTION INTRAMUSCULAR; INTRAVENOUS at 07:22

## 2021-07-30 RX ADMIN — ROCURONIUM BROMIDE 15 MG: 10 INJECTION, SOLUTION INTRAVENOUS at 09:19

## 2021-07-30 RX ADMIN — MIDAZOLAM 2 MG: 1 INJECTION INTRAMUSCULAR; INTRAVENOUS at 07:13

## 2021-07-30 RX ADMIN — FAMOTIDINE 20 MG: 10 INJECTION, SOLUTION INTRAVENOUS at 07:22

## 2021-07-30 RX ADMIN — HEPARIN SODIUM 6000 UNITS: 1000 INJECTION, SOLUTION INTRAVENOUS; SUBCUTANEOUS at 08:40

## 2021-07-30 RX ADMIN — SODIUM CHLORIDE, SODIUM LACTATE, POTASSIUM CHLORIDE, AND CALCIUM CHLORIDE: 600; 310; 30; 20 INJECTION, SOLUTION INTRAVENOUS at 09:17

## 2021-07-30 RX ADMIN — PHENYLEPHRINE HYDROCHLORIDE 160 MCG: 10 INJECTION INTRAVENOUS at 11:37

## 2021-07-30 RX ADMIN — HEPARIN SODIUM 1000 UNITS: 1000 INJECTION, SOLUTION INTRAVENOUS; SUBCUTANEOUS at 10:03

## 2021-07-30 RX ADMIN — Medication 2000 MG: at 23:42

## 2021-07-30 RX ADMIN — PHENYLEPHRINE HYDROCHLORIDE 80 MCG: 10 INJECTION INTRAVENOUS at 09:42

## 2021-07-30 RX ADMIN — PROPOFOL 100 MG: 10 INJECTION, EMULSION INTRAVENOUS at 07:38

## 2021-07-30 RX ADMIN — ROSUVASTATIN CALCIUM 10 MG: 10 TABLET, FILM COATED ORAL at 20:20

## 2021-07-30 RX ADMIN — FENTANYL CITRATE 100 MCG: 50 INJECTION, SOLUTION INTRAMUSCULAR; INTRAVENOUS at 07:38

## 2021-07-30 RX ADMIN — Medication 1 TABLET: at 15:27

## 2021-07-30 RX ADMIN — HEPARIN SODIUM 1000 UNITS: 1000 INJECTION, SOLUTION INTRAVENOUS; SUBCUTANEOUS at 09:42

## 2021-07-30 RX ADMIN — CLOPIDOGREL BISULFATE 75 MG: 75 TABLET ORAL at 15:27

## 2021-07-30 RX ADMIN — PHENYLEPHRINE HYDROCHLORIDE 160 MCG: 10 INJECTION INTRAVENOUS at 07:41

## 2021-07-30 RX ADMIN — FENTANYL CITRATE 100 MCG: 50 INJECTION, SOLUTION INTRAMUSCULAR; INTRAVENOUS at 11:26

## 2021-07-30 RX ADMIN — PHENYLEPHRINE HYDROCHLORIDE 80 MCG: 10 INJECTION INTRAVENOUS at 10:58

## 2021-07-30 RX ADMIN — ONDANSETRON HYDROCHLORIDE 4 MG: 2 INJECTION, SOLUTION INTRAMUSCULAR; INTRAVENOUS at 11:29

## 2021-07-30 RX ADMIN — ROCURONIUM BROMIDE 65 MG: 10 INJECTION, SOLUTION INTRAVENOUS at 07:38

## 2021-07-30 ASSESSMENT — PAIN SCALES - GENERAL
PAINLEVEL_OUTOF10: 8
PAINLEVEL_OUTOF10: 6
PAINLEVEL_OUTOF10: 0

## 2021-07-30 ASSESSMENT — ENCOUNTER SYMPTOMS: SHORTNESS OF BREATH: 0

## 2021-07-30 ASSESSMENT — LIFESTYLE VARIABLES: SMOKING_STATUS: 1

## 2021-07-30 NOTE — OP NOTE
Preoperative diagnosis:  1. Atherosclerosis of native arteries bilateral lower extremities with severe claudication right greater than left lower extremity  2. Cigarette abuse    Postoperative diagnosis: Same    Operative procedure:  1. Ultrasound-guided cannulation left common femoral artery with 5 Western Raeann and later 6 Western Raeann sheath  2. Abdominal aortogram with bilateral iliofemoral arteriogram  3. Open right common femoral and profunda femoris endarterectomy with right greater saphenous vein patch harvested from the right calf  4. Open remote endarterectomy of the right external iliac artery  5. Right common/external iliac artery stents (Icast 10 mm x 38 mm proximal, express balloon expandable uncovered 10 mm x 57 mm stent distal common iliac/proximal external iliac, epic self-expanding uncovered 10 mm x 80 mm stent distal external iliac artery)  6. Completion right iliofemoral arteriograms  7. Left external iliac artery stent (epic self-expanding uncovered 9 mm x 80 mm stent)  8. Completion left iliofemoral arteriograms    Surgeon: Yong Espinal MD    Anesthesia: General    Estimated blood loss: 100 mL    Findings:  1. There is no significant stenosis of the distal abdominal aorta. There is an 80% stenosis of the distal right common iliac artery. The right internal iliac artery is fairly widely patent. There is a completely occluded right external iliac artery around 2 cm beyond the origin. The right common femoral artery does have some more subacute thrombus within it but its nearly completely occluded with chronic plaque as well. The right profunda femoris artery is occluded with plaque for at least the first 3 cm. The right superficial femoral artery is chronically occluded. The popliteal artery reconstitutes behind the knee. 2.  The left common iliac artery is patent without any high-grade stenosis. Left internal iliac artery is patent.   There is 70 to 80% stenosis of the left external iliac artery beginning at the origin and the stenosis is at least 50% all the way to the distal external iliac artery. Procedure in detail:    After the patient was consented and given intravenous antibiotics, he is brought to the operating room placed on the hybrid operating table supine position. General anesthesia was achieved. Garcia catheter was placed by nursing. Lines were placed by anesthesia. Both groins and the bilateral legs and feet were prepped and draped in usual sterile procedure. Ultrasound was used to visualize the right common femoral artery on the patient skin because there is no palpable pulse. A longitudinal incision was made in the right groin with knife. Dissection was carried down through subtenons tissue with Bovie electrocautery. The distal external iliac artery, common femoral, superficial femoral, and at least the first 5 cm of profunda femoris artery including branches were exposed and circumferentially dissected and Vesseloops placed for future vascular control. Micropuncture needle was then used to cannulate the left common femoral artery over the femoral head under ultrasound guidance and fluoroscopic visualization. Seldinger technique was utilized to place a 5 Western Raeann glide sheath. Patient was given intravenous heparin. We did redose every hour during the procedure. Over an 035 wire, an Omni Flush cath was placed into the distal abdominal aorta and distal abdominal aortogram with bilateral iliofemoral arteriograms were performed with the above findings. I passed the wire down into the internal iliac artery and then I was able to exchange the 5 Western Raeann sheath for a 6 Western Raeann destination sheath which was placed into the proximal right common iliac artery. I was able to cross the complete occlusion of the external iliac artery and passed the wire into the subintimal plane in the right common femoral artery.   The Vesseloops on the common femoral artery were then tightened and a longitudinal arteriotomy made in the common femoral artery with 11 blade and carried up to the proximal common femoral artery with Ham scissors and then well into the profunda femoris artery with Ham scissors. Above findings are noted. I was able to grab the 035 wire from the contralateral side so now we have an up and over wire and we know were in the true lumen proximally and distally now. Fairly extensive endarterectomy was performed with the common femoral artery first.  I utilized the mole ring remote endarterectomy of the entire external iliac artery. I was able to remove a long core of plaque. Now, I have good inflow. A 7 Citizen of [place] sheath was placed over the wire and then I reversed the wire results back in the true lumen up into the abdominal aorta from the right side. Utilizing roadmap assistance, right proximal common iliac artery stent was placed. Is a 10 mm x 38 mm covered balloon expandable stent. To try to save the internal iliac artery, the next stent is a 10 mm x 57 mm balloon expandable uncovered stent. Finally, I used a 10 mm x 80 mm uncovered self-expanding stent across the external iliac artery remote endarterectomy. We have pulsatile inflow at this time. A right iliofemoral arteriogram was performed and reveals no evidence for extravasation of dye. The internal iliac artery remains patent and there is excellent flow without any significant residual stenosis. An incision was made over the right greater saphenous vein in the mid calf. I did wanted to get from the ankle because I am afraid he might not have adequate circulation to heal that incision. Also, the vein is too small to perform bypass in the future at this level. We dissected a segment of greater saphenous vein from the right mid calf. It was left in situ at this point and moistened gauze was placed over the wound. An extensive endarterectomy was then performed of the profunda femoris artery.   I did perform endarterectomy of the first couple centimeters of superficial femoral artery but this is chronically occluded and the goal was to try to get at least the inflow and profunda open. [place] elevator was used to remove plaque from the profunda femoris artery to the end of the chronic occlusion. The distal intima was tacked to branch points with 7-0 Prolene interrupted sutures. Additional debris was removed from the endarterectomized segment under loupe magnification with small forceps. The greater saphenous vein was then harvested and expanded with vein solution. It was then spatulated with Ham scissors. The saphenous vein in the leg was ligated proximally distally with 4-0 Vicryl suture and clips. A vein patch repair was then performed of the common femoral and profunda femoris with 6-0 Prolene running suture. Prior to complain this repair, standard flushing techniques were used to remove any debris from the native vessels and flow was distorted right lower extremity. Completion right iliofemoral arteriograms were performed. It reveals a widely patent right iliofemoral segment and the profunda femoris artery is now widely patent. The superficial femoral artery is chronically occluded. Finally, left iliofemoral arteriograms were performed. A 9 mm x 80 mm self-expanding stent is placed within the distal common iliac extending all the way to the distal external iliac artery. Balloon angioplasty was performed within that stent with an 8 mm diameter balloon. Completion left iliofemoral arteriograms reveal an excellent result. The left internal iliac artery remains patent and the left external neck artery is now widely patent without a significant residual stenosis nor flow-limiting dissection. The 6 Surinamese sheath was removed and puncture site closed with a minx device. Heparin was partially reversed with protamine at this point.     The right groin was irrigated with saline hemostasis was excellent. The wound was closed in layers with 2-0 Vicryl figure-of-eight deep subcutaneous sutures, with 2-0 Vicryl figure-of-eight mid subcutaneous sutures, 3-0 Vicryl subcutaneous sutures, and skin staples. The Wound was closed with 3-0 Vicryl subcutaneous sutures and skin staples. The patient tolerated the procedure well. He was awakened from general anesthesia and brought to the recovery room in good condition.

## 2021-07-30 NOTE — ANESTHESIA POSTPROCEDURE EVALUATION
Department of Anesthesiology  Postprocedure Note    Patient: Elida Colmenares  MRN: [id number]  YOB: 1945  Date of evaluation: 7/30/2021  Time:  11:57 AM     Procedure Summary     Date: 07/30/21 Room / Location: [facility] OR [place] [address] / [facility]    Anesthesia Start: 0730 Anesthesia Stop: 9023    Procedure: RIGHT COMMON FEMORAL AND PROFUNDA ARTERY ENDARTERECTOMY WITH VEIN PATCH RIGHT EXTERNAL ILIAC ENDARTERECTOMY WITH STENT PLACEMENT, LEFT ILIAC STENT PLACEMENT (N/A ) Diagnosis: (I70.213)    Surgeons: Leatha Lee MD Responsible Provider: ROLANDO Arevalo CRNA    Anesthesia Type: general ASA Status: 3          Anesthesia Type: general    Claudia Phase I: Claudia Score: 10    Claudia Phase II:      Last vitals: Reviewed and per EMR flowsheets.        Anesthesia Post Evaluation    Patient location during evaluation: PACU  Patient participation: complete - patient participated  Level of consciousness: responsive to verbal stimuli  Pain score: 0  Airway patency: patent  Nausea & Vomiting: no vomiting and no nausea  Complications: no  Cardiovascular status: hemodynamically stable  Respiratory status: acceptable  Hydration status: stable  Comments: T 98.8

## 2021-07-30 NOTE — PROGRESS NOTES
4 Eyes Skin Assessment    Augusto Long is being assessed upon: Admission    I agree that I, Jas Mi RN, along with Montrell Chan (either 2 RN's or 1 LPN and 1 RN) have performed a thorough Head to Toe Skin Assessment on the patient. ALL assessment sites listed below have been assessed. Areas assessed by both nurses:     [x]   Head, Face, and Ears   [x]   Shoulders, Back, and Chest  [x]   Arms, Elbows, and Hands   [x]   Coccyx, Sacrum, and Ischium  [x]   Legs, Feet, and Heels    Does the Patient have Skin Breakdown?  No    Nick Prevention initiated: No  Wound Care Orders initiated: NA    [facility] nurse consulted for Pressure Injury (Stage 3,4, Unstageable, DTI, NWPT, and Complex wounds) and New or Established Ostomies: No        Primary Nurse eSignature: Jas Mi RN on 7/30/2021 at 2:06 PM      Co-Signer eSignature: {Esignature:[id number]}

## 2021-07-30 NOTE — ANESTHESIA PROCEDURE NOTES
Arterial Line:    An arterial line was placed using ultrasound guidance, in the pre-op for the following indication(s): continuous blood pressure monitoring and blood sampling needed. A 20 gauge (size), 1 and 3/4 inch (length), Arrow (type) catheter was placed, Seldinger technique used, into the left radial artery and a Brandtree Arterial Cannulation Support device was used for positioning, secured by tape and Tegaderm. Anesthesia type: Local  Local infiltration: None    Events:  patient tolerated procedure well with no complications and EBL 0mL.   7/30/2021 7:18 AM7/30/2021 7:18 AM  Anesthesiologist: Krystina Liz DO  Performed: Anesthesiologist   Preanesthetic Checklist  Completed: patient identified, IV checked, site marked, risks and benefits discussed, surgical consent, monitors and equipment checked, pre-op evaluation, timeout performed, anesthesia consent given, oxygen available and patient being monitored

## 2021-07-30 NOTE — ANESTHESIA PRE PROCEDURE
Department of Anesthesiology  Preprocedure Note       Name:  Rela Maurer   Age:  70 y.o.  :  1945                                          MRN:  [id number]         Date:  2021      Surgeon: Mayra Gardiner):  Rosalee Hinojosa MD    Procedure: Procedure(s):  RIGHT COMMON FEMORAL ARTERY ENDARTERECTOMY WITH RIGHT ILIAC STENT POSSIBLE LEFT TO RIGHT FEMORAL ARTERY TO FEMORAL ARTERY BYPASS WITH PTFA    Medications prior to admission:   Prior to Admission medications    Medication Sig Start Date End Date Taking? Authorizing Provider   Multiple Vitamins-Minerals (THERAPEUTIC MULTIVITAMIN-MINERALS) tablet Take 1 tablet by mouth daily    Historical Provider, MD   rosuvastatin (CRESTOR) 10 MG tablet Take 1 tablet by mouth nightly 21   ROLANDO Hernandez   aspirin 81 MG EC tablet Take 81 mg by mouth daily    Historical Provider, MD   lisinopril (PRINIVIL;ZESTRIL) 40 MG tablet Take 40 mg by mouth daily    Historical Provider, MD   HYDROCHLOROTHIAZIDE PO Take 25 mg by mouth daily     Historical Provider, MD   PARoxetine (PAXIL) 20 MG tablet Take 20 mg by mouth every morning    Historical Provider, MD       Current medications:    No current facility-administered medications for this visit. No current outpatient medications on file. Facility-Administered Medications Ordered in Other Visits   Medication Dose Route Frequency Provider Last Rate Last Admin    0.9 % sodium chloride infusion  25 mL Intravenous PRN ROLANDO Santoro        ceFAZolin (ANCEF) 2000 mg in 0.9% sodium chloride 50 mL IVPB  2,000 mg Intravenous On Call to [address] [place] [address], APRN        sodium chloride flush 0.9 % injection 10 mL  10 mL Intravenous 2 times per day ROLANDO Snatoro        sodium chloride flush 0.9 % injection 10 mL  10 mL Intravenous PRN ROLANDO Santoro        aspirin EC tablet 81 mg  81 mg Oral Once ROLANDO Santoro           Allergies:     Allergies   Allergen Reactions    Beef-Derived Products Hives     Throat swelling    Pork-Derived Products Hives     Throat swelling    Sulfa Antibiotics Other (See Comments)     Childhood allergy. Don't remember       Problem List:    Patient Active Problem List   Diagnosis Code    Asymptomatic stenosis of right carotid artery I65.21    Atherosclerosis with claudication of extremity ([facility] [address] I70.219    PVD (peripheral vascular disease) with claudication ([facility] I73.9       Past Medical History:        Diagnosis Date    Allergy to alpha-gal     started in the 80's    Arthritis     Atrial fibrillation ([facility] [address]     Carotid arterial disease ([address]     CVA (cerebral vascular accident) ([address]     loss of balance    Depression     Hyperlipidemia     Hypertension        Past Surgical History:        Procedure Laterality Date    CAROTID ENDARTERECTOMY Right 6/15/2021    RIGHT CAROTID ENDARTERECTOMY WITH COMPLETION ANGIOGRAM performed by Adán Guzman MD at [facility] Bilateral 2000    KNEE SURGERY Left 1996       Social History:    Social History     Tobacco Use    Smoking status: Current Every Day Smoker     Packs/day: 1.00     Years: 55.00     Pack years: 55.00     Types: Cigars     Start date: 1956    Smokeless tobacco: Never Used    Tobacco comment: 1 cigar per day    Substance Use Topics    Alcohol use: Yes     Alcohol/week: 12.0 standard drinks     Types: 12 Cans of beer per week     Comment: weekly                                Ready to quit: Not Answered  Counseling given: Not Answered  Comment: 1 cigar per day       Vital Signs (Current): There were no vitals filed for this visit.                                            BP Readings from Last 3 Encounters:   07/30/21 (!) 143/100   07/15/21 107/76   07/08/21 (!) 155/84       NPO Status:                                                                                 BMI:   Wt Readings from Last 3 Encounters:   07/30/21 141 lb (64 kg)   07/22/21 141 lb (64 kg)   06/16/21 141 lb 11.2 oz (64.3 kg)     There is no height or weight on file to calculate BMI.    CBC:   Lab Results   Component Value Date    WBC 9.3 07/22/2021    RBC 3.86 07/22/2021    HGB 12.6 07/22/2021    HCT 36.9 07/22/2021    MCV 95.6 07/22/2021    RDW 13.6 07/22/2021     07/22/2021       CMP:   Lab Results   Component Value Date     07/22/2021    K 4.7 07/22/2021     07/22/2021    CO2 28 07/22/2021    BUN 16 07/22/2021    CREATININE 1.1 07/22/2021    GFRAA >59 07/22/2021    LABGLOM >60 07/22/2021    GLUCOSE 83 07/22/2021    CALCIUM 9.6 07/22/2021       POC Tests: No results for input(s): POCGLU, POCNA, POCK, POCCL, POCBUN, POCHEMO, POCHCT in the last 72 hours. Coags:   Lab Results   Component Value Date    PROTIME 12.4 07/22/2021    INR 0.90 07/22/2021    APTT 26.8 07/22/2021       HCG (If Applicable): No results found for: PREGTESTUR, PREGSERUM, HCG, HCGQUANT     ABGs: No results found for: PHART, PO2ART, LCV9IAR, SRA3VEY, BEART, V8OMBCCT     Type & Screen (If Applicable):  No results found for: LABABO, LABRH    Drug/Infectious Status (If Applicable):  No results found for: HIV, HEPCAB    COVID-19 Screening (If Applicable): No results found for: COVID19        Anesthesia Evaluation  Patient summary reviewed and Nursing notes reviewed  Airway: Mallampati: I  TM distance: >3 FB   Neck ROM: full  Mouth opening: > = 3 FB Dental:    (+) edentulous      Pulmonary:   (+) current smoker    (-) asthma, shortness of breath and sleep apnea          Patient smoked on day of surgery. ROS comment: CXR:  Impression  1.. Nodular opacity within the right lateral lower lung zone may  represent a nipple shadow. Repeat frontal imaging with nipple markers  in place could BE obtained for further assessment. This nodule was not  confirmed on the lateral view.   2. No acute infiltrate or effusion   Cardiovascular:  Exercise tolerance: good (>4 METS),   (+) hypertension:, dysrhythmias (following right CEA pt with episode administered. Anesthetic plan and risks discussed with patient. Use of blood products discussed with patient whom consented to blood products.                    Lesly Armstrong,    7/30/2021

## 2021-07-30 NOTE — BRIEF OP NOTE
Brief Postoperative Note      Patient: Chase Holden  YOB: 1945  MRN: [id number]    Date of Procedure: 7/30/2021    Pre-Op Diagnosis: I70.213    Post-Op Diagnosis: Same       Procedure(s):  RIGHT COMMON FEMORAL AND PROFUNDA ARTERY ENDARTERECTOMY WITH VEIN PATCH RIGHT EXTERNAL ILIAC ENDARTERECTOMY WITH STENT PLACEMENT, LEFT ILIAC STENT PLACEMENT    Surgeon(s):  Aidan Billingsley MD    Assistant:  * No surgical staff found *    Anesthesia: General    Estimated Blood Loss (mL): 164    Complications: None    Specimens:   ID Type Source Tests Collected by Time Destination   A : RIGHT ILIAC-COMMON FEMORAL PLAQUE Tissue Leg SURGICAL PATHOLOGY Aidan Billingsley MD 7/30/2021 1122        Implants:  Implant Name Type Inv.  Item Serial No.  Lot No. LRB No. Used Action   STENT TRACHBRONCH L38MM [id number] CATH 7FR L120CM S STL PTFE - [id number]  STENT TRACHBRONCH L38MM [id number] CATH 7FR L120CM S STL PTFE [id number]  N/A 1 Implanted   STENT BILI AD L57MM [id number] CATH L75CM BLLN L60MM IL BARE  STENT BILI AD L57MM [id number] CATH L75CM BLLN L60MM IL BARE  [employer] [id number] N/A 1 Implanted   STENT PERIPH L80MM [id number] CATH L75CM GWIRE 0.035IN IL NIT  STENT PERIPH L80MM [id number] CATH L75CM GWIRE 0.035IN IL NIT  [employer] [id number] N/A 1 Implanted   STENT PERIPH L80MM [id number] CATH L75CM GWIRE 0.035IN IL NIT  STENT PERIPH L80MM [id number] CATH L75CM GWIRE 0.035IN IL NIT  [employer] [id number] Left 1 Implanted         Drains:   Urethral Catheter Double-lumen 16 fr (Active)       [REMOVED] Closed/Suction Drain Right Neck 10 [place] (Removed)       [REMOVED] Urethral Catheter Straight-tip 16 fr (Removed)       Findings: Right EIA occlusion, Right  CFA occlusion, Right profunda/sfa occlusion, Right JEFFERSON stenosis, Left EIA stenosis    Electronically signed by Garrett Ramirez MD on 7/30/2021 at 11:40 AM

## 2021-07-31 LAB
ANION GAP SERPL CALCULATED.3IONS-SCNC: 12 MMOL/L (ref 7–19)
BUN BLDV-MCNC: 21 MG/DL (ref 8–23)
CALCIUM SERPL-MCNC: 8.4 MG/DL (ref 8.8–10.2)
CHLORIDE BLD-SCNC: 102 MMOL/L (ref 98–111)
CO2: 23 MMOL/L (ref 22–29)
CREAT SERPL-MCNC: 1.1 MG/DL (ref 0.5–1.2)
GFR AFRICAN AMERICAN: >59
GFR NON-AFRICAN AMERICAN: >60
GLUCOSE BLD-MCNC: 158 MG/DL (ref 74–109)
HCT VFR BLD CALC: 29.7 % (ref 42–52)
HEMOGLOBIN: 10.1 G/DL (ref 14–18)
MCH RBC QN AUTO: 32.4 PG (ref 27–31)
MCHC RBC AUTO-ENTMCNC: 34 G/DL (ref 33–37)
MCV RBC AUTO: 95.2 FL (ref 80–94)
PDW BLD-RTO: 13.3 % (ref 11.5–14.5)
PLATELET # BLD: 198 K/UL (ref 130–400)
PMV BLD AUTO: 10.1 FL (ref 9.4–12.4)
POTASSIUM SERPL-SCNC: 4.5 MMOL/L (ref 3.5–5)
RBC # BLD: 3.12 M/UL (ref 4.7–6.1)
SODIUM BLD-SCNC: 137 MMOL/L (ref 136–145)
WBC # BLD: 14.8 K/UL (ref 4.8–10.8)

## 2021-07-31 PROCEDURE — 2580000003 HC RX 258: Performed by: SURGERY

## 2021-07-31 PROCEDURE — 6370000000 HC RX 637 (ALT 250 FOR IP): Performed by: SURGERY

## 2021-07-31 PROCEDURE — 85027 COMPLETE CBC AUTOMATED: CPT

## 2021-07-31 PROCEDURE — 1210000000 HC MED SURG R&B

## 2021-07-31 PROCEDURE — 80048 BASIC METABOLIC PNL TOTAL CA: CPT

## 2021-07-31 PROCEDURE — 99024 POSTOP FOLLOW-UP VISIT: CPT | Performed by: THORACIC SURGERY (CARDIOTHORACIC VASCULAR SURGERY)

## 2021-07-31 PROCEDURE — 36415 COLL VENOUS BLD VENIPUNCTURE: CPT

## 2021-07-31 RX ADMIN — PAROXETINE HYDROCHLORIDE 20 MG: 20 TABLET, FILM COATED ORAL at 08:07

## 2021-07-31 RX ADMIN — SODIUM CHLORIDE, PRESERVATIVE FREE 10 ML: 5 INJECTION INTRAVENOUS at 22:05

## 2021-07-31 RX ADMIN — CLOPIDOGREL BISULFATE 75 MG: 75 TABLET ORAL at 08:06

## 2021-07-31 RX ADMIN — HYDROCHLOROTHIAZIDE 25 MG: 25 TABLET ORAL at 08:06

## 2021-07-31 RX ADMIN — LISINOPRIL 40 MG: 20 TABLET ORAL at 08:06

## 2021-07-31 RX ADMIN — ASPIRIN 81 MG: 81 TABLET, COATED ORAL at 08:06

## 2021-07-31 RX ADMIN — SODIUM CHLORIDE, PRESERVATIVE FREE 10 ML: 5 INJECTION INTRAVENOUS at 08:06

## 2021-07-31 RX ADMIN — Medication 1 TABLET: at 08:06

## 2021-07-31 RX ADMIN — ROSUVASTATIN CALCIUM 10 MG: 10 TABLET, FILM COATED ORAL at 22:04

## 2021-07-31 ASSESSMENT — PAIN SCALES - GENERAL
PAINLEVEL_OUTOF10: 0
PAINLEVEL_OUTOF10: 0

## 2021-07-31 NOTE — PLAN OF CARE
Problem: Infection:  Goal: Will remain free from infection  Description: Will remain free from infection  7/31/2021 0239 by Theresa Rueda RN  Outcome: Ongoing  7/30/2021 1411 by Dennie Cox, RN  Outcome: Ongoing     Problem: Safety:  Goal: Free from accidental physical injury  Description: Free from accidental physical injury  7/31/2021 0239 by Theresa Rueda RN  Outcome: Ongoing  7/30/2021 1411 by Dennie Cox, RN  Outcome: Ongoing  Goal: Free from intentional harm  Description: Free from intentional harm  7/31/2021 0239 by Theresa Rueda RN  Outcome: Ongoing  7/30/2021 1411 by Dennie Cox, RN  Outcome: Ongoing     Problem: Daily Care:  Goal: Daily care needs are met  Description: Daily care needs are met  7/31/2021 0239 by Theresa Rueda RN  Outcome: Ongoing  7/30/2021 1411 by Dennie Cox, RN  Outcome: Ongoing     Problem: Pain:  Goal: Patient's pain/discomfort is manageable  Description: Patient's pain/discomfort is manageable  7/31/2021 0239 by Theresa Rueda RN  Outcome: Ongoing  7/30/2021 1411 by Dennie Cox, RN  Outcome: Ongoing  Goal: Pain level will decrease  Description: Pain level will decrease  7/31/2021 0239 by Theresa Rueda RN  Outcome: Ongoing  7/30/2021 1411 by Dennie Cox, RN  Outcome: Ongoing  Goal: Control of acute pain  Description: Control of acute pain  7/31/2021 0239 by Theresa Rueda RN  Outcome: Ongoing  7/30/2021 1411 by Dennie Cox, RN  Outcome: Ongoing  Goal: Control of chronic pain  Description: Control of chronic pain  7/31/2021 0239 by Theresa Rueda RN  Outcome: Ongoing  7/30/2021 1411 by Dennie Cox, RN  Outcome: Ongoing     Problem: Skin Integrity:  Goal: Skin integrity will stabilize  Description: Skin integrity will stabilize  7/31/2021 0239 by Theresa Rueda RN  Outcome: Ongoing  7/30/2021 1411 by Dennie Cox, RN  Outcome: Ongoing     Problem: Discharge Planning:  Goal: Patients continuum of care needs are met  Description: Patients continuum of care needs are met  7/31/2021 0239 by Hesham Pineda RN  Outcome: Ongoing  7/30/2021 1411 by Tung Dumont RN  Outcome: Ongoing     Problem: Mobility - Impaired:  Goal: Mobility will improve  Description: Mobility will improve  7/31/2021 0239 by Hesham Pineda RN  Outcome: Ongoing  7/30/2021 1411 by Tung Dumont RN  Outcome: Ongoing     Problem: Falls - Risk of:  Goal: Will remain free from falls  Description: Will remain free from falls  Outcome: Ongoing  Goal: Absence of physical injury  Description: Absence of physical injury  Outcome: Ongoing   Electronically signed by Hesham Pineda RN on 7/31/2021 at 2:40 AM

## 2021-07-31 NOTE — PROGRESS NOTES
Vascular Surgery  Dr.Carl Espinoza   Daily Progress Note    Pt Name: Sonya Brody,[address] [place] Record Number: [id number]  Date of Birth 1945   Today's Date: 7/31/2021        SUBJECTIVE:     Patient was seen and examined.   Pain is  controlled  has not had nausea/vomiting  has not had constipation  has not had diarrhea  No complaints     OBJECTIVE:     Patient Vitals for the past 24 hrs:   BP Temp Temp src Pulse Resp SpO2   07/31/21 0750 108/78 97.8 °F (36.6 °C) Temporal 87 16 94 %   07/31/21 0357 106/71 97.8 °F (36.6 °C) Temporal 87 14 95 %   07/31/21 0147 134/69 97.2 °F (36.2 °C) Temporal 95 16 94 %   07/31/21 0013 113/65 97.3 °F (36.3 °C) Temporal 96 14 95 %   07/30/21 2201 (!) 113/53 97.3 °F (36.3 °C) Temporal 100 16 96 %   07/30/21 2005 (!) 103/53 98.7 °F (37.1 °C) Temporal 95 16 99 %   07/30/21 1901 112/72 96.7 °F (35.9 °C) Temporal 89 18 99 %   07/30/21 1805 96/78 96.6 °F (35.9 °C) Temporal 74 16 95 %   07/30/21 1703 103/79 98.4 °F (36.9 °C) Temporal 99 18 93 %   07/30/21 1558 98/81 98 °F (36.7 °C) Temporal 95 16 94 %   07/30/21 1522 94/68 -- -- -- -- --   07/30/21 1520 89/76 98.3 °F (36.8 °C) Temporal 92 16 93 %   07/30/21 1449 90/76 97.7 °F (36.5 °C) Temporal 95 16 93 %   07/30/21 1421 93/80 98.1 °F (36.7 °C) Temporal 98 18 92 %   07/30/21 1400 95/80 98.7 °F (37.1 °C) Temporal 99 16 91 %   07/30/21 1345 108/84 98.8 °F (37.1 °C) Temporal 99 18 91 %   07/30/21 1330 -- -- -- 101 11 96 %   07/30/21 1325 (!) 152/83 98.8 °F (37.1 °C) -- 101 11 100 %   07/30/21 1320 (!) 156/83 -- -- 101 12 93 %   07/30/21 1315 133/87 -- -- 98 9 97 %   07/30/21 1310 (!) 150/82 -- -- 99 13 93 %   07/30/21 1305 (!) 146/83 -- -- 99 11 95 %   07/30/21 1300 (!) 155/84 -- -- 98 13 94 %   07/30/21 1255 (!) 146/85 -- -- 98 11 93 %   07/30/21 1250 (!) 156/83 -- -- 101 9 93 %   07/30/21 1245 (!) 153/80 -- -- 100 9 91 %   07/30/21 1242 -- -- -- 101 10 91 %   07/30/21 1240 (!) 153/85 -- -- 100 10 94 %   07/30/21 1235 (!) 155/82 -- -- 99 10 93 % 07/30/21 1230 (!) 143/82 -- -- 98 9 93 %   07/30/21 1225 (!) 140/73 -- -- 97 10 94 %   07/30/21 1220 (!) 143/68 -- -- 97 9 93 %   07/30/21 1215 (!) 145/77 -- -- 97 8 94 %   07/30/21 1210 132/70 -- -- 98 8 97 %   07/30/21 1205 132/72 -- -- 98 8 95 %   07/30/21 1200 133/78 -- -- 101 10 95 %   07/30/21 1155 (!) 151/79 98.8 °F (37.1 °C) Temporal 101 12 96 %         Intake/Output Summary (Last 24 hours) at 7/31/2021 1024  Last data filed at 7/31/2021 1001  Gross per 24 hour   Intake 338 ml   Output 1975 ml   Net -1637 ml       In: 338 [P.O.:210; I.V.:128]  Out: 1300 [Urine:1300]    I/O last 3 completed shifts: In: 1128 [I.V.:1128]  Out: 2120 [Urine:1820; Blood:300]     Date 07/31/21 0000 - 07/31/21 2359   Shift 5794-1421 1705-7918 8808-2170 24 Hour Total   INTAKE   P.O.(mL/kg/hr)  210  210   Shift Total(mL/kg)  210(3.3)  210(3.3)   OUTPUT   Urine(mL/kg/hr) 700(1.4)   700   Shift Total(mL/kg) 700(10.9)   700(10.9)   Weight (kg) 64 64 64 64       Wt Readings from Last 3 Encounters:   07/30/21 141 lb (64 kg)   07/22/21 141 lb (64 kg)   06/16/21 141 lb 11.2 oz (64.3 kg)        Body mass index is 22.08 kg/m². Diet: ADULT DIET;  Regular        MEDS:     Scheduled Meds:   aspirin  81 mg Oral Daily    clopidogrel  75 mg Oral Daily    hydroCHLOROthiazide  25 mg Oral Daily    lisinopril  40 mg Oral Daily    therapeutic multivitamin-minerals  1 tablet Oral Daily    PARoxetine  20 mg Oral QAM    rosuvastatin  10 mg Oral Nightly    sodium chloride flush  5-40 mL Intravenous 2 times per day     Continuous Infusions:   sodium chloride       PRN Meds:oxyCODONE-acetaminophen, 1 tablet, Q6H PRN  sodium chloride flush, 5-40 mL, PRN  sodium chloride, 25 mL, PRN  metoprolol tartrate, 25 mg, Q12H PRN  cloNIDine, 0.1 mg, Q2H PRN  HYDROmorphone, 0.25 mg, Q3H PRN   Or  HYDROmorphone, 0.5 mg, Q3H PRN  ondansetron, 4 mg, Q8H PRN   Or  ondansetron, 4 mg, Q6H PRN          PHYSICAL EXAM:     CONSTITUTIONAL: awake, alert, cooperative, no apparent distress, and appears stated age and Alert and oriented times 3, no acute distress and cooperative to examination. HEENT: Normal, Head is normocephalic, atraumatic. EOMI, PERRLA  NECK: Supple, symmetrical, trachea midline, no adenopathy, thyroid symmetric, not enlarged and no tenderness, skin normal and Soft, trachea midline and straight  LUNGS: negative, Chest expands equally bilaterally upon respiration, no accessory muscle used. Ausculation reveals no wheezes, rales or rhonchi. CARDIOVASCULAR: Heart sounds are normal.  Regular rate and rhythm without murmur, gallop or rub. ABDOMEN: soft, nontender, nondistended, no masses or organomegaly  NEUROLOGIC: Awake, alert, oriented to name, place and time. Cranial nerves II-XII are grossly intact. Motor is 5 out of 5 bilaterally. Cerebellar finger to nose, heel to shin intact. Sensory is intact. Babinski down going, Romberg negative, and gait is normal.  CN II-XII are grossly intact. There are no focalizing motor or sensory deficits  WOUND/INCISION:  healing well, no drainage, no erythema  EXTREMITY: no cyanosis, clubbing or edema  Groin dressings are dry and intact. There is no evidence of hematoma. There are bilateral dorsalis pedal Doppler signals. No significant swelling is noted. LABS:     CBC:   Recent Labs     07/31/21 0311   WBC 14.8*   RBC 3.12*   HGB 10.1*   HCT 29.7*   MCV 95.2*   MCH 32.4*   MCHC 34.0   RDW 13.3      MPV 10.1      Last 3 CMP:   Recent Labs     07/31/21  0311      K 4.5      CO2 23   BUN 21   CREATININE 1.1   GLUCOSE 158*   CALCIUM 8.4*      Troponin: No results for input(s): TROPONINI in the last 72 hours. Calcium:   Lab Results   Component Value Date    CALCIUM 8.4 07/31/2021    CALCIUM 9.6 07/22/2021    CALCIUM 9.8 07/15/2021      Ionized Calcium: No results found for: IONCA   Lipids: No results for input(s): CHOL, HDL in the last 72 hours.     Invalid input(s): LDLCALCU  INR: No results for input(s): INR in the last 72 hours. Lactic Acid: No results found for: LACTA     CBC:   Lab Results   Component Value Date    WBC 14.8 07/31/2021    RBC 3.12 07/31/2021    HGB 10.1 07/31/2021    HCT 29.7 07/31/2021    MCV 95.2 07/31/2021    MCH 32.4 07/31/2021    MCHC 34.0 07/31/2021    RDW 13.3 07/31/2021     07/31/2021    MPV 10.1 07/31/2021               ASSESSMENT:     POD # 1  HD # 1  Active Hospital Problems    Diagnosis Date Noted    Atherosclerosis of lower extremity with claudication [facility]) [I70.219] 07/30/2021    Atherosclerosis with claudication of extremity [facility]) [I70.219] 07/15/2021           Chief Complaint:  No chief complaint on file. PLAN:     Increased ambulation and out of bed. Patient was started on dual antiplatelet therapy and will continue on discharge. Patient does live a distance from the hospital and by himself. Feel that he should stay another day to be sure that he can ambulate success without difficulty. He does use a walker for assistance. Plan for discharge in the morning.

## 2021-08-01 VITALS
SYSTOLIC BLOOD PRESSURE: 104 MMHG | HEIGHT: 67 IN | HEART RATE: 96 BPM | RESPIRATION RATE: 18 BRPM | WEIGHT: 141 LBS | DIASTOLIC BLOOD PRESSURE: 81 MMHG | OXYGEN SATURATION: 93 % | BODY MASS INDEX: 22.13 KG/M2 | TEMPERATURE: 99.7 F

## 2021-08-01 PROCEDURE — 6370000000 HC RX 637 (ALT 250 FOR IP): Performed by: SURGERY

## 2021-08-01 PROCEDURE — 2580000003 HC RX 258: Performed by: SURGERY

## 2021-08-01 RX ADMIN — ASPIRIN 81 MG: 81 TABLET, COATED ORAL at 09:17

## 2021-08-01 RX ADMIN — LISINOPRIL 40 MG: 20 TABLET ORAL at 09:17

## 2021-08-01 RX ADMIN — HYDROCHLOROTHIAZIDE 25 MG: 25 TABLET ORAL at 09:17

## 2021-08-01 RX ADMIN — CLOPIDOGREL BISULFATE 75 MG: 75 TABLET ORAL at 09:17

## 2021-08-01 RX ADMIN — SODIUM CHLORIDE, PRESERVATIVE FREE 10 ML: 5 INJECTION INTRAVENOUS at 09:18

## 2021-08-01 RX ADMIN — PAROXETINE HYDROCHLORIDE 20 MG: 20 TABLET, FILM COATED ORAL at 09:17

## 2021-08-01 RX ADMIN — Medication 1 TABLET: at 09:17

## 2021-08-01 ASSESSMENT — PAIN SCALES - GENERAL: PAINLEVEL_OUTOF10: 0

## 2021-08-01 NOTE — PROGRESS NOTES
Discharge medication reconciliation completed with Dr. Nancy Mojica.     Electronically signed by Pat Alvarez RN on 8/1/2021 at 9:47 AM

## 2021-08-11 ENCOUNTER — OFFICE VISIT (OUTPATIENT)
Dept: FAMILY MEDICINE CLINIC | Facility: CLINIC | Age: 76
End: 2021-08-11

## 2021-08-11 VITALS
SYSTOLIC BLOOD PRESSURE: 132 MMHG | HEART RATE: 89 BPM | DIASTOLIC BLOOD PRESSURE: 86 MMHG | OXYGEN SATURATION: 98 % | HEIGHT: 67 IN | RESPIRATION RATE: 16 BRPM | BODY MASS INDEX: 23.7 KG/M2 | WEIGHT: 151 LBS

## 2021-08-11 DIAGNOSIS — I10 ESSENTIAL HYPERTENSION: ICD-10-CM

## 2021-08-11 DIAGNOSIS — I73.9 PVD (PERIPHERAL VASCULAR DISEASE) (HCC): Primary | ICD-10-CM

## 2021-08-11 PROCEDURE — 1170F FXNL STATUS ASSESSED: CPT | Performed by: FAMILY MEDICINE

## 2021-08-11 PROCEDURE — G0439 PPPS, SUBSEQ VISIT: HCPCS | Performed by: FAMILY MEDICINE

## 2021-08-11 PROCEDURE — 1160F RVW MEDS BY RX/DR IN RCRD: CPT | Performed by: FAMILY MEDICINE

## 2021-08-11 PROCEDURE — 99213 OFFICE O/P EST LOW 20 MIN: CPT | Performed by: FAMILY MEDICINE

## 2021-08-11 RX ORDER — ROSUVASTATIN CALCIUM 10 MG/1
TABLET, COATED ORAL
COMMUNITY
Start: 2021-08-03 | End: 2022-09-07 | Stop reason: SDUPTHER

## 2021-08-11 RX ORDER — ASPIRIN 81 MG/1
81 TABLET ORAL
COMMUNITY

## 2021-08-11 RX ORDER — MULTIPLE VITAMINS W/ MINERALS TAB 9MG-400MCG
1 TAB ORAL
COMMUNITY

## 2021-08-11 RX ORDER — CLOPIDOGREL BISULFATE 75 MG/1
TABLET ORAL
COMMUNITY
Start: 2021-07-30 | End: 2022-09-07 | Stop reason: SDUPTHER

## 2021-08-11 NOTE — PROGRESS NOTES
The ABCs of the Annual Wellness Visit  Subsequent Medicare Wellness Visit    Chief Complaint   Patient presents with   • Peripheral Vascular Disease     6 wk f/u   pt was to discuss doing phys ther   • Medicare Wellness-subsequent       Subjective   History of Present Illness:  Crys Sky is a 75 y.o. male who presents for a Subsequent Medicare Wellness Visit.    HEALTH RISK ASSESSMENT    Recent Hospitalizations:  Recently treated at the following:  Other: [facility]    Current Medical Providers:  Patient Care Team:  Steven Silverio MD as PCP - General (Family Medicine)    Smoking Status:  Social History     Tobacco Use   Smoking Status Current Every Day Smoker   • Packs/day: 3.00   • Years: 50.00   • Pack years: 150.00   • Types: Cigars   Smokeless Tobacco Never Used       Alcohol Consumption:  Social History     Substance and Sexual Activity   Alcohol Use Not on file       Depression Screen:   PHQ-2/PHQ-9 Depression Screening 8/11/2021   Little interest or pleasure in doing things 0   Feeling down, depressed, or hopeless 3   Trouble falling or staying asleep, or sleeping too much 2   Feeling tired or having little energy 1   Poor appetite or overeating 0   Feeling bad about yourself - or that you are a failure or have let yourself or your family down 0   Trouble concentrating on things, such as reading the newspaper or watching television 0   Moving or speaking so slowly that other people could have noticed. Or the opposite - being so fidgety or restless that you have been moving around a lot more than usual 0   Thoughts that you would be better off dead, or of hurting yourself in some way 0   Total Score 6   If you checked off any problems, how difficult have these problems made it for you to do your work, take care of things at home, or get along with other people? Not difficult at all       Fall Risk Screen:  STEADI Fall Risk Assessment was completed, and patient is at LOW risk for  falls.Assessment completed on:8/11/2021    Health Habits and Functional and Cognitive Screening:  Functional & Cognitive Status 8/11/2021   Do you have difficulty preparing food and eating? No   Do you have difficulty bathing yourself, getting dressed or grooming yourself? No   Do you have difficulty using the toilet? No   Do you have difficulty moving around from place to place? No   Do you have trouble with steps or getting out of a bed or a chair? No   Current Diet Well Balanced Diet   Dental Exam Up to date   Eye Exam Not up to date   Exercise (times per week) 4 times per week   Current Exercises Include Walking   Do you need help using the phone?  No   Are you deaf or do you have serious difficulty hearing?  No   Do you need help with transportation? No   Do you need help shopping? No   Do you need help preparing meals?  No   Do you need help with housework?  No   Do you need help with laundry? No   Do you need help taking your medications? No   Do you need help managing money? No   Do you ever drive or ride in a car without wearing a seat belt? No   Have you felt unusual stress, anger or loneliness in the last month? No   Who do you live with? Alone   If you need help, do you have trouble finding someone available to you? No   Have you been bothered in the last four weeks by sexual problems? No   Do you have difficulty concentrating, remembering or making decisions? No         Does the patient have evidence of cognitive impairment? No    Asprin use counseling:Taking ASA appropriately as indicated    Age-appropriate Screening Schedule:  Refer to the list below for future screening recommendations based on patient's age, sex and/or medical conditions. Orders for these recommended tests are listed in the plan section. The patient has been provided with a written plan.    Health Maintenance   Topic Date Due   • TDAP/TD VACCINES (1 - Tdap) Never done   • ZOSTER VACCINE (1 of 2) Never done   • INFLUENZA VACCINE   "10/01/2021   • LIPID PANEL  06/15/2022          The following portions of the patient's history were reviewed and updated as appropriate: allergies, current medications, past family history, past medical history, past social history, past surgical history and problem list.    Outpatient Medications Prior to Visit   Medication Sig Dispense Refill   • aspirin (aspirin) 81 MG EC tablet Take 81 mg by mouth.     • clopidogrel (PLAVIX) 75 MG tablet      • HYDROCHLOROTHIAZIDE PO Take 25 mg by mouth.     • lisinopril (PRINIVIL,ZESTRIL) 40 MG tablet Take 40 mg by mouth.     • multivitamin with minerals (therapeutic multivitamin-minerals) tablet tablet Take 1 tablet by mouth.     • PARoxetine (PAXIL) 20 MG tablet Take 20 mg by mouth.     • rosuvastatin (CRESTOR) 10 MG tablet        No facility-administered medications prior to visit.       There is no problem list on file for this patient.      Advanced Care Planning:  ACP discussion was held with the patient during this visit. Patient does not have an advance directive, information provided.    Review of Systems    Compared to one year ago, the patient feels his physical health is the same.  Compared to one year ago, the patient feels his mental health is the same.    Reviewed chart for potential of high risk medication in the elderly: yes  Reviewed chart for potential of harmful drug interactions in the elderly:yes    Objective         Vitals:    08/11/21 1041   BP: 132/86   Pulse: 89   Resp: 16   SpO2: 98%   Weight: 68.5 kg (151 lb)   Height: 170.2 cm (67\")       Body mass index is 23.65 kg/m².  Discussed the patient's BMI with him. The BMI is in the acceptable range.    Physical Exam    Lab Results   Component Value Date    CHLPL 212 (H) 06/15/2021    TRIG 128 06/15/2021    HDL 41 (L) 06/15/2021     06/15/2021        Assessment/Plan   Medicare Risks and Personalized Health Plan  CMS Preventative Services Quick Reference  Advance Directive Discussion    The above " risks/problems have been discussed with the patient.  Pertinent information has been shared with the patient in the After Visit Summary.  Follow up plans and orders are seen below in the Assessment/Plan Section.    Diagnoses and all orders for this visit:    1. PVD (peripheral vascular disease) (CMS/HCC) (Primary)    2. Essential hypertension      Follow Up:  No follow-ups on file.     An After Visit Summary and PPPS were given to the patient.

## 2021-08-11 NOTE — PROGRESS NOTES
"Subjective   Crys Sky is a 75 y.o. male.     Chief Complaint   Patient presents with   • Peripheral Vascular Disease     6 wk f/u   pt was to discuss doing phys ther   • Medicare Wellness-subsequent       History of Present Illness     he recently had srugery for pvd in lower extremitiesi...he noes good bp contro without cp or ha...      Current Outpatient Medications:   •  aspirin (aspirin) 81 MG EC tablet, Take 81 mg by mouth., Disp: , Rfl:   •  clopidogrel (PLAVIX) 75 MG tablet, , Disp: , Rfl:   •  HYDROCHLOROTHIAZIDE PO, Take 25 mg by mouth., Disp: , Rfl:   •  lisinopril (PRINIVIL,ZESTRIL) 40 MG tablet, Take 40 mg by mouth., Disp: , Rfl:   •  multivitamin with minerals (therapeutic multivitamin-minerals) tablet tablet, Take 1 tablet by mouth., Disp: , Rfl:   •  PARoxetine (PAXIL) 20 MG tablet, Take 20 mg by mouth., Disp: , Rfl:   •  rosuvastatin (CRESTOR) 10 MG tablet, , Disp: , Rfl:   Allergies   Allergen Reactions   • Sulfa Antibiotics Rash     Childhood allergy   • Beef-Derived Products Hives   • Pork-Derived Products Hives       No past medical history on file.  No past surgical history on file.    Review of Systems   Constitutional: Negative.    HENT: Negative.    Eyes: Negative.    Respiratory: Negative.    Cardiovascular: Negative.    Gastrointestinal: Negative.    Endocrine: Negative.    Genitourinary: Negative.    Musculoskeletal: Negative.    Skin: Negative.    Allergic/Immunologic: Negative.    Neurological: Negative.    Hematological: Negative.    Psychiatric/Behavioral: Negative.        Objective  /86   Pulse 89   Resp 16   Ht 170.2 cm (67\")   Wt 68.5 kg (151 lb)   SpO2 98%   BMI 23.65 kg/m²   Physical Exam  Vitals and nursing note reviewed.   Constitutional:       Appearance: Normal appearance. He is normal weight.   HENT:      Head: Normocephalic and atraumatic.      Right Ear: Ear canal normal.      Left Ear: Ear canal normal.      Nose: Nose normal.      Mouth/Throat:      Mouth: " Mucous membranes are moist.      Pharynx: Oropharynx is clear.   Eyes:      Extraocular Movements: Extraocular movements intact.      Conjunctiva/sclera: Conjunctivae normal.      Pupils: Pupils are equal, round, and reactive to light.   Cardiovascular:      Rate and Rhythm: Normal rate and regular rhythm.      Heart sounds: Normal heart sounds.   Pulmonary:      Effort: Pulmonary effort is normal.      Breath sounds: Normal breath sounds.   Abdominal:      General: Abdomen is flat. Bowel sounds are normal.   Musculoskeletal:         General: Normal range of motion.      Cervical back: Normal range of motion and neck supple.   Skin:     General: Skin is warm.      Capillary Refill: Capillary refill takes less than 2 seconds.   Neurological:      General: No focal deficit present.      Mental Status: He is alert and oriented to person, place, and time. Mental status is at baseline.   Psychiatric:         Mood and Affect: Mood normal.         Behavior: Behavior normal.         Thought Content: Thought content normal.         Judgment: Judgment normal.         Assessment/Plan   Diagnoses and all orders for this visit:    1. PVD (peripheral vascular disease) (CMS/[facility]) (Primary)    2. Essential hypertension    he will continue his meds and monitor bp and keep me informed  He will keep apt with his vascular surgery           No orders of the defined types were placed in this encounter.      Follow up: 4 month(s)

## 2021-08-17 ENCOUNTER — HOSPITAL ENCOUNTER (OUTPATIENT)
Dept: GENERAL RADIOLOGY | Age: 76
Discharge: HOME | End: 2021-08-17
Payer: MEDICARE

## 2021-08-17 ENCOUNTER — OFFICE VISIT (OUTPATIENT)
Dept: VASCULAR SURGERY | Age: 76
End: 2021-08-17

## 2021-08-17 VITALS
TEMPERATURE: 97.9 F | SYSTOLIC BLOOD PRESSURE: 83 MMHG | RESPIRATION RATE: 16 BRPM | HEART RATE: 111 BPM | BODY MASS INDEX: 22.13 KG/M2 | DIASTOLIC BLOOD PRESSURE: 61 MMHG | WEIGHT: 141 LBS | HEIGHT: 67 IN | OXYGEN SATURATION: 98 %

## 2021-08-17 DIAGNOSIS — I65.23 BILATERAL CAROTID ARTERY STENOSIS: ICD-10-CM

## 2021-08-17 DIAGNOSIS — I70.213 ATHEROSCLER OF NATIVE ARTERY OF BOTH LEGS WITH INTERMIT CLAUDICATION (HCC): Primary | ICD-10-CM

## 2021-08-17 PROCEDURE — 71045 X-RAY EXAM CHEST 1 VIEW: CPT

## 2021-08-17 PROCEDURE — 99024 POSTOP FOLLOW-UP VISIT: CPT | Performed by: NURSE PRACTITIONER

## 2021-08-17 NOTE — PROGRESS NOTES
Jose Mina is a 70 y.o. male who presents for post op evaluation. Patient had a1. Ultrasound-guided cannulation left common femoral artery with 5 [place] and later 6 [place] sheath  2. Abdominal aortogram with bilateral iliofemoral arteriogram  3. Open right common femoral and profunda femoris endarterectomy with right greater saphenous vein patch harvested from the right calf  4. Open remote endarterectomy of the right external iliac artery  5. Right common/external iliac artery stents (Icast 10 mm x 38 mm proximal, express balloon expandable uncovered 10 mm x 57 mm stent distal common iliac/proximal external iliac, epic self-expanding uncovered 10 mm x 80 mm stent distal external iliac artery)  6. Completion right iliofemoral arteriograms  7. Left external iliac artery stent (epic self-expanding uncovered 9 mm x 80 mm stent)  8. Completion left iliofemoral arteriograms   2 weeks ago. This was performed by Dr. Irais Ortiz. Post op symptoms reported none. Post op pain is minimal.      On evaluation today, incision is clean, dry, intact. No signs of infection are present. Today we removed all staples and sutures. femoral pulses are present 2+ . Today we have recommended he Wash incision daily with soap and water and cover with dry gauze until healed. We have recommended continued ASA 81 mg po qd, clopidogrel 75 mg po qd daily. We will follow up with him in 3 months. This should bring you up to date on Jose Mina  As always we want to thank you for allowing us to participate in the care of your patients.     Sincerely,    ROLANDO Alfonso

## 2021-08-20 ENCOUNTER — TELEPHONE (OUTPATIENT)
Dept: VASCULAR SURGERY | Age: 76
End: 2021-08-20

## 2021-08-20 NOTE — TELEPHONE ENCOUNTER
----- Message from ROLANDO Shell sent at 8/20/2021  6:59 AM CDT -----  Please let him know xray is okay and no nodules seen

## 2021-09-04 NOTE — DISCHARGE SUMMARY
Physician Discharge Summary          Patient ID:  Elton Coelho  [id number]  20 y.o.  1945    Date of Admission:  7/30/2021    Date of Discharge:  8/1/2021 10:25 AM     Admitting Physician:  Shirley Arnold. Adrianna Manzanares M.D. Primary Diagnosis:    Atherosclerosis of native arteries with claudication       Other Diagnoses:        1. Essential HTN  2. Afib  3. Osteoarthritis  4. Carotid arterial disease  5. Hyperlipidemia    Operative Procedures:      Preoperative diagnosis:  1. Atherosclerosis of native arteries bilateral lower extremities with severe claudication right greater than left lower extremity  2. Cigarette abuse     Postoperative diagnosis: Same     Operative procedure:  1. Ultrasound-guided cannulation left common femoral artery with 5 [place] Rae[place] and later 6 [place]aeann sheath  2. Abdominal aortogram with bilateral iliofemoral arteriogram  3. Open right common femoral and profunda femoris endarterectomy with right greater saphenous vein patch harvested from the right calf  4. Open remote endarterectomy of the right external iliac artery  5. Right common/external iliac artery stents (Icast 10 mm x 38 mm proximal, express balloon expandable uncovered 10 mm x 57 mm stent distal common iliac/proximal external iliac, epic self-expanding uncovered 10 mm x 80 mm stent distal external iliac artery)  6. Completion right iliofemoral arteriograms  7. Left external iliac artery stent (epic self-expanding uncovered 9 mm x 80 mm stent)  8. Completion left iliofemoral arteriograms     Surgeon: Valorie Rubalcava MD     Anesthesia: General     Estimated blood loss: 100 mL     Findings:  1. There is no significant stenosis of the distal abdominal aorta. There is an 80% stenosis of the distal right common iliac artery. The right internal iliac artery is fairly widely patent. There is a completely occluded right external iliac artery around 2 cm beyond the origin.   The right common femoral artery does have some more subacute thrombus within it but its nearly completely occluded with chronic plaque as well. The right profunda femoris artery is occluded with plaque for at least the first 3 cm. The right superficial femoral artery is chronically occluded. The popliteal artery reconstitutes behind the knee. 2.  The left common iliac artery is patent without any high-grade stenosis. Left internal iliac artery is patent. There is 70 to 80% stenosis of the left external iliac artery beginning at the origin and the stenosis is at least 50% all the way to the distal external iliac artery.     Procedure in detail:     After the patient was consented and given intravenous antibiotics, he is brought to the operating room placed on the hybrid operating table supine position. General anesthesia was achieved. Garcia catheter was placed by nursing. Lines were placed by anesthesia. Both groins and the bilateral legs and feet were prepped and draped in usual sterile procedure.     Ultrasound was used to visualize the right common femoral artery on the patient skin because there is no palpable pulse. A longitudinal incision was made in the right groin with knife. Dissection was carried down through subtenons tissue with Bovie electrocautery. The distal external iliac artery, common femoral, superficial femoral, and at least the first 5 cm of profunda femoris artery including branches were exposed and circumferentially dissected and Vesseloops placed for future vascular control.     Micropuncture needle was then used to cannulate the left common femoral artery over the femoral head under ultrasound guidance and fluoroscopic visualization. Seldinger technique was utilized to place a 5 [place] Raeann glide sheath. Patient was given intravenous heparin. We did redose every hour during the procedure.   Over an 035 wire, an Omni Flush cath was placed into the distal abdominal aorta and distal abdominal aortogram with bilateral iliofemoral arteriograms were performed with the above findings. I passed the wire down into the internal iliac artery and then I was able to exchange the 5 [place]aeann sheath for a 6 Western Raeann destination sheath which was placed into the proximal right common iliac artery. I was able to cross the complete occlusion of the external iliac artery and passed the wire into the subintimal plane in the right common femoral artery. The Vesseloops on the common femoral artery were then tightened and a longitudinal arteriotomy made in the common femoral artery with 11 blade and carried up to the proximal common femoral artery with Ham scissors and then well into the profunda femoris artery with Ham scissors. Above findings are noted. I was able to grab the 035 wire from the contralateral side so now we have an up and over wire and we know were in the true lumen proximally and distally now. Fairly extensive endarterectomy was performed with the common femoral artery first.  I utilized the mole ring remote endarterectomy of the entire external iliac artery. I was able to remove a long core of plaque. Now, I have good inflow. A 7 [place] sheath was placed over the wire and then I reversed the wire results back in the true lumen up into the abdominal aorta from the right side.     Utilizing roadmap assistance, right proximal common iliac artery stent was placed. Is a 10 mm x 38 mm covered balloon expandable stent. To try to save the internal iliac artery, the next stent is a 10 mm x 57 mm balloon expandable uncovered stent. Finally, I used a 10 mm x 80 mm uncovered self-expanding stent across the external iliac artery remote endarterectomy. We have pulsatile inflow at this time. A right iliofemoral arteriogram was performed and reveals no evidence for extravasation of dye.   The internal iliac artery remains patent and there is excellent flow without any significant residual stenosis.     An incision was made over the right greater saphenous vein in the mid calf. I did wanted to get from the ankle because I am afraid he might not have adequate circulation to heal that incision. Also, the vein is too small to perform bypass in the future at this level. We dissected a segment of greater saphenous vein from the right mid calf. It was left in situ at this point and moistened gauze was placed over the wound.     An extensive endarterectomy was then performed of the profunda femoris artery. I did perform endarterectomy of the first couple centimeters of superficial femoral artery but this is chronically occluded and the goal was to try to get at least the inflow and profunda open. [place] elevator was used to remove plaque from the profunda femoris artery to the end of the chronic occlusion. The distal intima was tacked to branch points with 7-0 Prolene interrupted sutures. Additional debris was removed from the endarterectomized segment under loupe magnification with small forceps.     The greater saphenous vein was then harvested and expanded with vein solution. It was then spatulated with Ham scissors. The saphenous vein in the leg was ligated proximally distally with 4-0 Vicryl suture and clips. A vein patch repair was then performed of the common femoral and profunda femoris with 6-0 Prolene running suture. Prior to complain this repair, standard flushing techniques were used to remove any debris from the native vessels and flow was distorted right lower extremity.     Completion right iliofemoral arteriograms were performed. It reveals a widely patent right iliofemoral segment and the profunda femoris artery is now widely patent. The superficial femoral artery is chronically occluded.     Finally, left iliofemoral arteriograms were performed. A 9 mm x 80 mm self-expanding stent is placed within the distal common iliac extending all the way to the distal external iliac artery.   Balloon angioplasty was performed within that stent with an 8 mm diameter balloon. Completion left iliofemoral arteriograms reveal an excellent result. The left internal iliac artery remains patent and the left external neck artery is now widely patent without a significant residual stenosis nor flow-limiting dissection. The 6 [place] sheath was removed and puncture site closed with a minx device. Heparin was partially reversed with protamine at this point.     The right groin was irrigated with saline hemostasis was excellent. The wound was closed in layers with 2-0 Vicryl figure-of-eight deep subcutaneous sutures, with 2-0 Vicryl figure-of-eight mid subcutaneous sutures, 3-0 Vicryl subcutaneous sutures, and skin staples. The Wound was closed with 3-0 Vicryl subcutaneous sutures and skin staples. The patient tolerated the procedure well. He was awakened from general anesthesia and brought to the recovery room in good condition. History and Physical:    See History and Physical on admission without additions nor deletions. Hospital Course:    He was admitted after the above stated procedure (please see operative note for details of procedure). He was admitted to the med/surg floor. On POD#1, His diet and activity were increased. On the date of discharge, His vitals are noted on Epic. His abdomen is soft, non-tender, nondistended, and leg wounds are C/D/I without hematomas. His feet are warm without any signs of distal embolization. Discharge Instructions:    Discharge instructions were discussed with the patient prior to discharge. The patient was also given written discharge instructions. Discharge Medications:    See Epic for discharge medication list, including any new prescriptions. Follow-Up:    He will follow-up in the office in 2-3 weeks. He can call with any questions or concerns.      Signed:  Kirstin Dunham MD  9/4/2021  10:19 AM

## 2022-03-08 DIAGNOSIS — I70.219 ATHEROSCLEROSIS WITH CLAUDICATION OF EXTREMITY (HCC): ICD-10-CM

## 2022-03-08 RX ORDER — CLOPIDOGREL BISULFATE 75 MG/1
TABLET ORAL
Qty: 30 TABLET | Refills: 5 | Status: SHIPPED | OUTPATIENT
Start: 2022-03-08 | End: 2022-05-17

## 2022-05-16 ENCOUNTER — TELEPHONE (OUTPATIENT)
Dept: VASCULAR SURGERY | Age: 77
End: 2022-05-16

## 2022-05-16 NOTE — TELEPHONE ENCOUNTER
The pt called to notify us he has been experiencing loss of muscle control that is causing him to fall. This has happened 4-5 times since [facility] 5/12/22 and the last episode was [facility] 5/13/22. He reports that he is having issues with his vision, he describes this as a tv losing it's signal. He can read words but is unable to comprehend them. He is also having trouble with thinking and focusing. After coni Santana PA-C I advised the pt to report to the nearest ER right away. The pt states he is not driving at the moment due to his current sx. He will not have a  until tomorrow. I explained to him with these sx it would be best for him to be evaluated asap. The pt feels he will be okay to wait until tomorrow. I advised if se worsen he needs to call 911 right away. The pt voiced understanding and is aware.

## 2022-05-17 ENCOUNTER — APPOINTMENT (OUTPATIENT)
Dept: CT IMAGING | Age: 77
End: 2022-05-17
Payer: MEDICARE

## 2022-05-17 ENCOUNTER — TELEPHONE (OUTPATIENT)
Dept: VASCULAR SURGERY | Age: 77
End: 2022-05-17

## 2022-05-17 ENCOUNTER — APPOINTMENT (OUTPATIENT)
Dept: GENERAL RADIOLOGY | Age: 77
End: 2022-05-17
Payer: MEDICARE

## 2022-05-17 ENCOUNTER — HOSPITAL ENCOUNTER (EMERGENCY)
Age: 77
Discharge: HOME | End: 2022-05-17
Attending: EMERGENCY MEDICINE
Payer: MEDICARE

## 2022-05-17 VITALS
DIASTOLIC BLOOD PRESSURE: 117 MMHG | SYSTOLIC BLOOD PRESSURE: 187 MMHG | HEART RATE: 81 BPM | RESPIRATION RATE: 18 BRPM | OXYGEN SATURATION: 92 % | TEMPERATURE: 97.8 F

## 2022-05-17 DIAGNOSIS — Z72.0 TOBACCO ABUSE: ICD-10-CM

## 2022-05-17 DIAGNOSIS — G45.9 TIA (TRANSIENT ISCHEMIC ATTACK): Primary | ICD-10-CM

## 2022-05-17 DIAGNOSIS — I65.22 STENOSIS OF LEFT CAROTID ARTERY: ICD-10-CM

## 2022-05-17 LAB
ALBUMIN SERPL-MCNC: 4.9 G/DL (ref 3.5–5.2)
ALP BLD-CCNC: 112 U/L (ref 40–130)
ALT SERPL-CCNC: 20 U/L (ref 5–41)
ANION GAP SERPL CALCULATED.3IONS-SCNC: 9 MMOL/L (ref 7–19)
AST SERPL-CCNC: 25 U/L (ref 5–40)
BACTERIA: NEGATIVE /HPF
BASOPHILS ABSOLUTE: 0 K/UL (ref 0–0.2)
BASOPHILS RELATIVE PERCENT: 0.2 % (ref 0–1)
BILIRUB SERPL-MCNC: 0.4 MG/DL (ref 0.2–1.2)
BILIRUBIN URINE: NEGATIVE
BLOOD, URINE: NEGATIVE
BUN BLDV-MCNC: 14 MG/DL (ref 8–23)
CALCIUM SERPL-MCNC: 10 MG/DL (ref 8.8–10.2)
CHLORIDE BLD-SCNC: 98 MMOL/L (ref 98–111)
CLARITY: CLEAR
CO2: 31 MMOL/L (ref 22–29)
COLOR: YELLOW
CREAT SERPL-MCNC: 1 MG/DL (ref 0.5–1.2)
CRYSTALS, UA: ABNORMAL /HPF
EKG P AXIS: 66 DEGREES
EKG P-R INTERVAL: 174 MS
EKG Q-T INTERVAL: 372 MS
EKG QRS DURATION: 86 MS
EKG QTC CALCULATION (BAZETT): 415 MS
EKG T AXIS: 67 DEGREES
EOSINOPHILS ABSOLUTE: 0.2 K/UL (ref 0–0.6)
EOSINOPHILS RELATIVE PERCENT: 1.8 % (ref 0–5)
EPITHELIAL CELLS, UA: 0 /HPF (ref 0–5)
GFR AFRICAN AMERICAN: >59
GFR NON-AFRICAN AMERICAN: >60
GLUCOSE BLD-MCNC: 125 MG/DL (ref 74–109)
GLUCOSE URINE: 100 MG/DL
HCT VFR BLD CALC: 47.6 % (ref 42–52)
HEMOGLOBIN: 16.4 G/DL (ref 14–18)
HYALINE CASTS: 2 /HPF (ref 0–8)
IMMATURE GRANULOCYTES #: 0 K/UL
INR BLD: 0.96 (ref 0.88–1.18)
KETONES, URINE: NEGATIVE MG/DL
LEUKOCYTE ESTERASE, URINE: NEGATIVE
LYMPHOCYTES ABSOLUTE: 2.2 K/UL (ref 1.1–4.5)
LYMPHOCYTES RELATIVE PERCENT: 26 % (ref 20–40)
MCH RBC QN AUTO: 31.9 PG (ref 27–31)
MCHC RBC AUTO-ENTMCNC: 34.5 G/DL (ref 33–37)
MCV RBC AUTO: 92.6 FL (ref 80–94)
MONOCYTES ABSOLUTE: 0.9 K/UL (ref 0–0.9)
MONOCYTES RELATIVE PERCENT: 10.6 % (ref 0–10)
NEUTROPHILS ABSOLUTE: 5.2 K/UL (ref 1.5–7.5)
NEUTROPHILS RELATIVE PERCENT: 61.2 % (ref 50–65)
NITRITE, URINE: NEGATIVE
PDW BLD-RTO: 12.9 % (ref 11.5–14.5)
PH UA: 7.5 (ref 5–8)
PLATELET # BLD: 257 K/UL (ref 130–400)
PMV BLD AUTO: 9.6 FL (ref 9.4–12.4)
POTASSIUM SERPL-SCNC: 3.8 MMOL/L (ref 3.5–5)
PROTEIN UA: 30 MG/DL
PROTHROMBIN TIME: 12.7 SEC (ref 12–14.6)
RBC # BLD: 5.14 M/UL (ref 4.7–6.1)
RBC UA: 1 /HPF (ref 0–4)
SODIUM BLD-SCNC: 138 MMOL/L (ref 136–145)
SPECIFIC GRAVITY UA: 1.01 (ref 1–1.03)
TOTAL PROTEIN: 8 G/DL (ref 6.6–8.7)
TROPONIN: <0.01 NG/ML (ref 0–0.03)
UROBILINOGEN, URINE: 1 E.U./DL
WBC # BLD: 8.6 K/UL (ref 4.8–10.8)
WBC UA: 0 /HPF (ref 0–5)

## 2022-05-17 PROCEDURE — 81001 URINALYSIS AUTO W/SCOPE: CPT

## 2022-05-17 PROCEDURE — 93010 ELECTROCARDIOGRAM REPORT: CPT | Performed by: INTERNAL MEDICINE

## 2022-05-17 PROCEDURE — 36415 COLL VENOUS BLD VENIPUNCTURE: CPT

## 2022-05-17 PROCEDURE — 6360000004 HC RX CONTRAST MEDICATION: Performed by: EMERGENCY MEDICINE

## 2022-05-17 PROCEDURE — 70450 CT HEAD/BRAIN W/O DYE: CPT

## 2022-05-17 PROCEDURE — 6370000000 HC RX 637 (ALT 250 FOR IP): Performed by: EMERGENCY MEDICINE

## 2022-05-17 PROCEDURE — 84484 ASSAY OF TROPONIN QUANT: CPT

## 2022-05-17 PROCEDURE — 99285 EMERGENCY DEPT VISIT HI MDM: CPT

## 2022-05-17 PROCEDURE — 71045 X-RAY EXAM CHEST 1 VIEW: CPT

## 2022-05-17 PROCEDURE — 85610 PROTHROMBIN TIME: CPT

## 2022-05-17 PROCEDURE — 80053 COMPREHEN METABOLIC PANEL: CPT

## 2022-05-17 PROCEDURE — 70498 CT ANGIOGRAPHY NECK: CPT

## 2022-05-17 PROCEDURE — 85025 COMPLETE CBC W/AUTO DIFF WBC: CPT

## 2022-05-17 PROCEDURE — 93005 ELECTROCARDIOGRAM TRACING: CPT | Performed by: EMERGENCY MEDICINE

## 2022-05-17 RX ORDER — CLOPIDOGREL BISULFATE 75 MG/1
75 TABLET ORAL DAILY
Qty: 30 TABLET | Refills: 1 | Status: SHIPPED | OUTPATIENT
Start: 2022-05-17

## 2022-05-17 RX ORDER — CLOPIDOGREL BISULFATE 75 MG/1
75 TABLET ORAL ONCE
Status: COMPLETED | OUTPATIENT
Start: 2022-05-17 | End: 2022-05-17

## 2022-05-17 RX ADMIN — IOPAMIDOL 90 ML: 755 INJECTION, SOLUTION INTRAVENOUS at 12:00

## 2022-05-17 RX ADMIN — CLOPIDOGREL BISULFATE 75 MG: 75 TABLET ORAL at 14:46

## 2022-05-17 ASSESSMENT — ENCOUNTER SYMPTOMS
SHORTNESS OF BREATH: 0
ABDOMINAL PAIN: 0
COUGH: 0
BACK PAIN: 0

## 2022-05-17 NOTE — ED NOTES
Called Dr Colonel Bryan with Vascular Surgery @ [phone] @ [phone]. Advised by the office that Dr Colonel Bryan is no longer with their practice and that his new number is [phone].      Lelia Gill  05/17/22 5432

## 2022-05-17 NOTE — ED PROVIDER NOTES
[facility] EMERGENCY DEPT  eMERGENCY dEPARTMENT eNCOUnter      Pt Name: Nabil Durán  MRN: [id number]  [place] 1945  Date of evaluation: 5/17/2022  Provider: Randy Dykes MD    CHIEF COMPLAINT       Chief Complaint   Patient presents with    Fall     on thursday this started. pt reports that the last time he had this set of symptoms he ended up with a enderarterectomy to his carotid     Neurologic Problem     pt reports intermittent speech issues, hand eye coordination and blurred vision. since thursday          HISTORY OF PRESENT ILLNESS   (Location/Symptom, Timing/Onset,Context/Setting, Quality, Duration, Modifying Factors, Severity)  Note limiting factors. Nabil Durán is a 78 y.o. male who presents to the emergency department with issues since last Thursday of intermittent trouble reading as well as drop attacks as well as hand eye coordination and blurred vision. Patient seems to be okay today he does not complain of anything really acutely his blood pressure is elevated he did not take his medications today he has a history of a right carotid endarterectomy last year with Dr. Chuck Guevara he also has has peripheral arterial disease and aortic disease and surgery Dr. Chuck Guevara in the past.  The patient denies any numbness and tingling in his arms or legs that this time his coordination is okay can walk he could not come in till today because he did not have a ride he smokes cigarettes his sister brought him to the ER. The patient denies any chest pain or shortness of breath he does say he fell and hit his head in the last week. The patient complains of some mild forehead pain. The patient otherwise is awake alert he states that he is concerned that these are his symptoms he had last time he needed a carotid endarterectomy. Of note it sounds like he stopped taking his Plavix because he thought he was out or done with it a couple months ago.     The history is provided by the patient, medical records and a relative. NursingNotes were reviewed. REVIEW OF SYSTEMS    (2-9 systems for level 4, 10 or more for level 5)     Review of Systems   Constitutional: Negative for fever. Eyes: Positive for visual disturbance. Respiratory: Negative for cough and shortness of breath. Cardiovascular: Negative for chest pain. Gastrointestinal: Negative for abdominal pain. Musculoskeletal: Positive for gait problem. Negative for back pain, neck pain and neck stiffness. Neurological: Positive for dizziness and weakness. Negative for seizures, facial asymmetry and headaches. Psychiatric/Behavioral: Negative for confusion. A complete review of systems was performed and is negative except as noted above in the HPI.        PAST MEDICAL HISTORY     Past Medical History:   Diagnosis Date    Allergy to alpha-gal     started in the 80's    Arthritis     [facility]     Blood circulation, collateral     Carotid arterial disease ([facility]     CVA (cerebral vascular accident) ([facility]     loss of balance    Depression     Hyperlipidemia     Hypertension          SURGICAL HISTORY       Past Surgical History:   Procedure Laterality Date    CAROTID ENDARTERECTOMY Right 6/15/2021    RIGHT CAROTID ENDARTERECTOMY WITH COMPLETION ANGIOGRAM performed by Lauryn العلي MD at [address] Bilateral 2000   [address] N/A 7/30/2021    RIGHT COMMON FEMORAL AND PROFUNDA ARTERY ENDARTERECTOMY WITH VEIN PATCH RIGHT EXTERNAL ILIAC ENDARTERECTOMY WITH STENT PLACEMENT, LEFT ILIAC [address] [facility] performed by Lauryn العلي MD at [address] [place] [address]       Discharge Medication List as of 5/17/2022  2:43 PM      CONTINUE these medications which have NOT CHANGED    Details   Multiple Vitamins-Minerals (THERAPEUTIC MULTIVITAMIN-MINERALS) tablet Take 1 tablet by mouth dailyHistorical Med      aspirin 81 MG EC tablet Take 81 mg by mouth dailyHistorical Med      lisinopril (PRINIVIL;ZESTRIL) 40 MG tablet Take 40 mg by mouth dailyHistorical Med      HYDROCHLOROTHIAZIDE PO Take 25 mg by mouth daily Historical Med      PARoxetine (PAXIL) 20 MG tablet Take 20 mg by mouth every morningHistorical Med             ALLERGIES     Beef-derived products, Pork-derived products, and Sulfa antibiotics    FAMILY HISTORY       Family History   Problem Relation Age of Onset    Breast Cancer Mother           SOCIAL HISTORY       Social History     Socioeconomic History    Marital status:      Spouse name: Not on file    Number of children: Not on file    Years of education: Not on file    Highest education level: Not on file   Occupational History    Not on file   Tobacco Use    Smoking status: Current Every Day Smoker     Packs/day: 1.00     Years: 55.00     Pack years: 55.00     Types: Cigars     Start date: 1956    Smokeless tobacco: Never Used    Tobacco comment: 1 cigar per day    Vaping Use    Vaping Use: Never used   Substance and Sexual Activity    Alcohol use: Yes     Alcohol/week: 12.0 standard drinks     Types: 12 Cans of beer per week     Comment: weekly    Drug use: Yes     Types: Marijuana [place])     Comment: very rare (last 7/26/2021)    Sexual activity: Not on file   Other Topics Concern    Not on file   Social History Narrative    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 once divorcedHe has 1 child a daughterNever in the [facility] and riding motorcycles remains activeSmokes 1 to 2 cigars a day quit smoking cigarettes 15 to 20 years ago     Social Determinants of Health     Financial Resource Strain:     Difficulty of Paying Living Expenses: Not on file   Food Insecurity:     Worried About Running Out of Food in the Last Year: Not on file    Sandy of Food in the Last Year: Not on file   Transportation Needs:     Lack of Transportation (Medical):  Not on file    Lack of Transportation (Non-Medical): Not on file   Physical Activity:     Days of Exercise per Week: Not on file    Minutes of Exercise per Session: Not on file   Stress:     Feeling of Stress : Not on file   Social Connections:     Frequency of Communication with Friends and Family: Not on file    Frequency of Social Gatherings with Friends and Family: Not on file    Attends Temple Services: Not on file    Active Member of [address] [place] [address] or Organizations: Not on file    Attends Club or Organization Meetings: Not on file    Marital Status: Not on file   Intimate Partner Violence:     Fear of Current or Ex-Partner: Not on file    Emotionally Abused: Not on file    Physically Abused: Not on file    Sexually Abused: Not on file   Housing Stability:     Unable to Pay for Housing in the Last Year: Not on file    Number of [place] in the Last Year: Not on file    Unstable Housing in the Last Year: Not on file       SCREENINGS    [place] Coma Scale  Eye Opening: Spontaneous  Best Verbal Response: Oriented  Best Motor Response: Obeys commands  [place] Coma Scale Score: 15        PHYSICAL EXAM    (up to 7 for level 4, 8 or more for level 5)     ED Triage Vitals [05/17/22 0954]   BP Temp Temp Source Pulse Resp SpO2 Height Weight   (!) 222/120 98.2 °F (36.8 °C) Oral 102 18 95 % -- --       Physical Exam  Vitals and nursing note reviewed. Constitutional:       General: He is not in acute distress. Appearance: Normal appearance. He is not toxic-appearing. HENT:      Head: Normocephalic and atraumatic. Mouth/Throat:      Mouth: Mucous membranes are moist.   Eyes:      Extraocular Movements: Extraocular movements intact. Pupils: Pupils are equal, round, and reactive to light. Cardiovascular:      Rate and Rhythm: Normal rate and regular rhythm. Pulmonary:      Effort: Pulmonary effort is normal. No respiratory distress. Abdominal:      General: Abdomen is flat. There is no distension. Palpations: Abdomen is soft. Tenderness: There is no abdominal tenderness. Musculoskeletal:         General: No tenderness. Normal range of motion. Cervical back: Normal range of motion and neck supple. Comments: His feet have reasonable circulation bilaterally are not cold there is no obvious ulcers or lesions. Skin:     General: Skin is warm and dry. Neurological:      General: No focal deficit present. Mental Status: He is alert and oriented to person, place, and time. GCS: GCS eye subscore is 4. GCS verbal subscore is 5. GCS motor subscore is 6. Cranial Nerves: Cranial nerves are intact. Sensory: No sensory deficit. Motor: No weakness. Coordination: Coordination normal.      Gait: Gait normal.      Comments: His symptoms seem to have resolved at this time nothing obviously focal on exam.   Psychiatric:         Mood and Affect: Mood normal.         Behavior: Behavior normal.         DIAGNOSTIC RESULTS     EKG: All EKG's are interpreted by the Emergency Department Physician who either signs or Co-signs this chart in the absence of a cardiologist.    EKG sinus 85 likely some LVH with repull change and hypertrophy. Some ST depression mild inferior laterally. Mild ST elevation in aVR no obvious acute ST elevation likely chronic. There is some artifact.  ms  ms. RADIOLOGY:   Non-plain film images such as CT, Ultrasound and MRI are read by the radiologist. Plainradiographic images are visualized and preliminarily interpreted by the emergency physician with the below findings:        Interpretation per the Radiologist below, if available at the time of this note:     [facility]   Final Result   1. Mixed atherosclerotic plaque with focal high-grade stenosis at the   left ICA origin. Signed by Dr Catrachita Culp   Final Result   1. No acute intracranial abnormality is seen.        Signed by Dr Arley Fenton Shields      XR CHEST PORTABLE   Final Result   1. No radiographic evidence of acute cardiopulmonary process. Signed by Dr Lexus Cameron            ED BEDSIDE ULTRASOUND:   Performed by ED Physician - none    LABS:  Labs Reviewed   COMPREHENSIVE METABOLIC PANEL - Abnormal; Notable for the following components:       Result Value    CO2 31 (*)     Glucose 125 (*)     All other components within normal limits   CBC WITH AUTO DIFFERENTIAL - Abnormal; Notable for the following components:    MCH 31.9 (*)     Monocytes % 10.6 (*)     All other components within normal limits   URINALYSIS WITH REFLEX TO CULTURE - Abnormal; Notable for the following components:    Glucose, Ur 100 (*)     Protein, UA 30 (*)     All other components within normal limits   MICROSCOPIC URINALYSIS - Abnormal; Notable for the following components:    Bacteria, UA NEGATIVE (*)     Crystals, UA NEG (*)     All other components within normal limits   PROTIME-INR   TROPONIN       All other labs were within normal range or not returned as of this dictation. EMERGENCY DEPARTMENT COURSE and DIFFERENTIALDIAGNOSIS/MDM:   Vitals:    Vitals:    05/17/22 1115 05/17/22 1300 05/17/22 1400 05/17/22 1452   BP: (!) 161/108 (!) 191/116 (!) 176/112 (!) 187/117   Pulse: 83 78 83 81   Resp: 21 16 18 18   Temp:    97.8 °F (36.6 °C)   TempSrc:    Oral   SpO2: 93% 90% 92% 92%       MDM  Number of Diagnoses or Management Options  Stenosis of left carotid artery  TIA (transient ischemic attack)  Tobacco abuse  Diagnosis management comments: Patient presents with TIA-like symptoms over the weekend. He states this is like when he had his right CEA done. The patient was found to have on CTA a left carotid stenosis. The patient states that the symptoms were the same he does not have strokelike symptoms today. He states that he is back to his normal baseline. Case was discussed with neurology Dr. King Degree as well as Dr. Alexis Burton of vascular.   Patient has not been taking his Plavix. He has been smoking. Patient was started back on his Plavix at recommendation of Dr. Carmen Peralta. Patient is hypertensive however neurology felt that this was important not to drop his blood pressure as to cause him to have a watershed infarct. The patient will be left to be permissively hypertensive and he has follow-up established by me tomorrow as Dr. Carmen Peralta did not recommend admission at this time but placing him back on Plavix and to follow-up with him tomorrow in the office. An appointment was made he has family who can take him tomorrow to the appointment. And then figure out an appointment for when to have the surgery in the next week. The patient was told to call 911 or return to the ER if anything should change he was given a prescription for his plastic by me. He was neurologically at his baseline with no sign of stroke in the last week from these likely TIAs and his carotid stenosis. Amount and/or Complexity of Data Reviewed  Clinical lab tests: ordered and reviewed  Tests in the radiology section of CPT®: ordered and reviewed  Decide to obtain previous medical records or to obtain history from someone other than the patient: yes  Obtain history from someone other than the patient: yes  Discuss the patient with other providers: yes  Independent visualization of images, tracings, or specimens: yes    Patient Progress  Patient progress: stable        CONSULTS:  IP CONSULT TO VASCULAR SURGERY    PROCEDURES:  Unless otherwise notedbelow, none     Procedures    FINAL IMPRESSION     1. TIA (transient ischemic attack)    2. Stenosis of left carotid artery    3.  Tobacco abuse          DISPOSITION/PLAN   DISPOSITION        PATIENT REFERRED TO:  Nu Gold MD  [address] [place] [id number]  [phone]    Go in 1 day  0845 am tomorrow      DISCHARGE MEDICATIONS:  Discharge Medication List as of 5/17/2022  2:43 PM             (Please note that portions of this note were completed with a voice recognition program.  Efforts were made to edit the dictations butoccasionally words are mis-transcribed.)    Alicia Sharma MD (electronically signed)  AttendingEmergency Physician         Alicia Sharma MD  05/17/22 5402

## 2022-05-20 ENCOUNTER — HOSPITAL ENCOUNTER (OUTPATIENT)
Dept: PREADMISSION TESTING | Age: 77
Discharge: HOME | End: 2022-05-24
Payer: MEDICARE

## 2022-05-20 VITALS — WEIGHT: 146 LBS | HEIGHT: 67 IN | BODY MASS INDEX: 22.91 KG/M2

## 2022-05-20 LAB
ABO/RH: NORMAL
ANTIBODY SCREEN: NORMAL
APTT: 27.4 SEC (ref 26–36.2)
MRSA SCREEN RT-PCR: NOT DETECTED

## 2022-05-20 PROCEDURE — 86901 BLOOD TYPING SEROLOGIC RH(D): CPT

## 2022-05-20 PROCEDURE — 86850 RBC ANTIBODY SCREEN: CPT

## 2022-05-20 PROCEDURE — 86900 BLOOD TYPING SEROLOGIC ABO: CPT

## 2022-05-20 PROCEDURE — 85730 THROMBOPLASTIN TIME PARTIAL: CPT

## 2022-05-20 PROCEDURE — 87641 MR-STAPH DNA AMP PROBE: CPT

## 2022-05-20 NOTE — H&P (VIEW-ONLY)
Cardiology read ekg/old ekg, 6/16/21 echo and ER notes reviewed per dr. Delon Ca. Ok to proceed with anesthesia for surgery 5/27/22. Karuna lopez with dr. Ellen Recinos notified.

## 2022-05-20 NOTE — PROGRESS NOTES
Cardiology read ekg/old ekg, 6/16/21 echo and ER notes reviewed per dr. Donovan Esposito. Ok to proceed with anesthesia for surgery 5/27/22. Karuna lopez with dr. Aleksander Gaines notified.

## 2022-05-27 ENCOUNTER — APPOINTMENT (OUTPATIENT)
Dept: INTERVENTIONAL RADIOLOGY/VASCULAR | Age: 77
DRG: 039 | End: 2022-05-27
Attending: SURGERY
Payer: MEDICARE

## 2022-05-27 ENCOUNTER — ANESTHESIA EVENT (OUTPATIENT)
Dept: OPERATING ROOM | Age: 77
DRG: 039 | End: 2022-05-27
Payer: MEDICARE

## 2022-05-27 ENCOUNTER — ANESTHESIA (OUTPATIENT)
Dept: OPERATING ROOM | Age: 77
DRG: 039 | End: 2022-05-27
Payer: MEDICARE

## 2022-05-27 ENCOUNTER — HOSPITAL ENCOUNTER (INPATIENT)
Age: 77
LOS: 1 days | Discharge: HOME | DRG: 039 | End: 2022-05-28
Attending: SURGERY | Admitting: SURGERY
Payer: MEDICARE

## 2022-05-27 DIAGNOSIS — I65.22 CAROTID STENOSIS, LEFT: ICD-10-CM

## 2022-05-27 LAB
ABO/RH: NORMAL
ANTIBODY SCREEN: NORMAL

## 2022-05-27 PROCEDURE — 2709999900 HC NON-CHARGEABLE SUPPLY: Performed by: SURGERY

## 2022-05-27 PROCEDURE — 3600000005 HC SURGERY LEVEL 5 BASE: Performed by: SURGERY

## 2022-05-27 PROCEDURE — 2780000010 HC IMPLANT OTHER: Performed by: SURGERY

## 2022-05-27 PROCEDURE — C1768 GRAFT, VASCULAR: HCPCS | Performed by: SURGERY

## 2022-05-27 PROCEDURE — 6370000000 HC RX 637 (ALT 250 FOR IP): Performed by: SURGERY

## 2022-05-27 PROCEDURE — 86901 BLOOD TYPING SEROLOGIC RH(D): CPT

## 2022-05-27 PROCEDURE — 03UL0KZ SUPPLEMENT LEFT INTERNAL CAROTID ARTERY WITH NONAUTOLOGOUS TISSUE SUBSTITUTE, OPEN APPROACH: ICD-10-PCS | Performed by: SURGERY

## 2022-05-27 PROCEDURE — 2580000003 HC RX 258: Performed by: SURGERY

## 2022-05-27 PROCEDURE — 6360000002 HC RX W HCPCS: Performed by: SURGERY

## 2022-05-27 PROCEDURE — A4217 STERILE WATER/SALINE, 500 ML: HCPCS | Performed by: SURGERY

## 2022-05-27 PROCEDURE — 7100000000 HC PACU RECOVERY - FIRST 15 MIN: Performed by: SURGERY

## 2022-05-27 PROCEDURE — 03CL0ZZ EXTIRPATION OF MATTER FROM LEFT INTERNAL CAROTID ARTERY, OPEN APPROACH: ICD-10-PCS | Performed by: SURGERY

## 2022-05-27 PROCEDURE — 3700000000 HC ANESTHESIA ATTENDED CARE: Performed by: SURGERY

## 2022-05-27 PROCEDURE — 3700000001 HC ADD 15 MINUTES (ANESTHESIA): Performed by: SURGERY

## 2022-05-27 PROCEDURE — 86900 BLOOD TYPING SEROLOGIC ABO: CPT

## 2022-05-27 PROCEDURE — 2580000003 HC RX 258: Performed by: ANESTHESIOLOGY

## 2022-05-27 PROCEDURE — 7100000001 HC PACU RECOVERY - ADDTL 15 MIN: Performed by: SURGERY

## 2022-05-27 PROCEDURE — 86850 RBC ANTIBODY SCREEN: CPT

## 2022-05-27 PROCEDURE — 88304 TISSUE EXAM BY PATHOLOGIST: CPT

## 2022-05-27 PROCEDURE — 1210000000 HC MED SURG R&B

## 2022-05-27 PROCEDURE — 2500000003 HC RX 250 WO HCPCS: Performed by: NURSE ANESTHETIST, CERTIFIED REGISTERED

## 2022-05-27 PROCEDURE — 6360000002 HC RX W HCPCS: Performed by: ANESTHESIOLOGY

## 2022-05-27 PROCEDURE — 88311 DECALCIFY TISSUE: CPT

## 2022-05-27 PROCEDURE — 6360000002 HC RX W HCPCS: Performed by: NURSE ANESTHETIST, CERTIFIED REGISTERED

## 2022-05-27 PROCEDURE — 3600000015 HC SURGERY LEVEL 5 ADDTL 15MIN: Performed by: SURGERY

## 2022-05-27 PROCEDURE — 03CJ0ZZ EXTIRPATION OF MATTER FROM LEFT COMMON CAROTID ARTERY, OPEN APPROACH: ICD-10-PCS | Performed by: SURGERY

## 2022-05-27 PROCEDURE — 03UJ0KZ SUPPLEMENT LEFT COMMON CAROTID ARTERY WITH NONAUTOLOGOUS TISSUE SUBSTITUTE, OPEN APPROACH: ICD-10-PCS | Performed by: SURGERY

## 2022-05-27 DEVICE — PATCH BIOLOGIC VASC 1CM WX14CM L .55MM THICKNESSXENOSURE: Type: IMPLANTABLE DEVICE | Site: CAROTID | Status: FUNCTIONAL

## 2022-05-27 RX ORDER — MEPERIDINE HYDROCHLORIDE 25 MG/ML
12.5 INJECTION INTRAMUSCULAR; INTRAVENOUS; SUBCUTANEOUS EVERY 5 MIN PRN
Status: DISCONTINUED | OUTPATIENT
Start: 2022-05-27 | End: 2022-05-27 | Stop reason: HOSPADM

## 2022-05-27 RX ORDER — SODIUM CHLORIDE 9 MG/ML
INJECTION, SOLUTION INTRAVENOUS CONTINUOUS
Status: ACTIVE | OUTPATIENT
Start: 2022-05-27 | End: 2022-05-28

## 2022-05-27 RX ORDER — SODIUM CHLORIDE 0.9 % (FLUSH) 0.9 %
5-40 SYRINGE (ML) INJECTION PRN
Status: DISCONTINUED | OUTPATIENT
Start: 2022-05-27 | End: 2022-05-28 | Stop reason: HOSPADM

## 2022-05-27 RX ORDER — ESMOLOL HYDROCHLORIDE 10 MG/ML
INJECTION INTRAVENOUS PRN
Status: DISCONTINUED | OUTPATIENT
Start: 2022-05-27 | End: 2022-05-27 | Stop reason: SDUPTHER

## 2022-05-27 RX ORDER — ONDANSETRON 2 MG/ML
4 INJECTION INTRAMUSCULAR; INTRAVENOUS
Status: DISCONTINUED | OUTPATIENT
Start: 2022-05-27 | End: 2022-05-27 | Stop reason: HOSPADM

## 2022-05-27 RX ORDER — ROCURONIUM BROMIDE 10 MG/ML
INJECTION, SOLUTION INTRAVENOUS PRN
Status: DISCONTINUED | OUTPATIENT
Start: 2022-05-27 | End: 2022-05-27 | Stop reason: SDUPTHER

## 2022-05-27 RX ORDER — HYDRALAZINE HYDROCHLORIDE 20 MG/ML
10 INJECTION INTRAMUSCULAR; INTRAVENOUS
Status: DISCONTINUED | OUTPATIENT
Start: 2022-05-27 | End: 2022-05-28 | Stop reason: HOSPADM

## 2022-05-27 RX ORDER — HEPARIN SODIUM 1000 [USP'U]/ML
INJECTION, SOLUTION INTRAVENOUS; SUBCUTANEOUS PRN
Status: DISCONTINUED | OUTPATIENT
Start: 2022-05-27 | End: 2022-05-27 | Stop reason: SDUPTHER

## 2022-05-27 RX ORDER — SODIUM CHLORIDE 9 MG/ML
INJECTION, SOLUTION INTRAVENOUS PRN
Status: DISCONTINUED | OUTPATIENT
Start: 2022-05-27 | End: 2022-05-27 | Stop reason: HOSPADM

## 2022-05-27 RX ORDER — LISINOPRIL 20 MG/1
40 TABLET ORAL DAILY
Status: DISCONTINUED | OUTPATIENT
Start: 2022-05-27 | End: 2022-05-28 | Stop reason: HOSPADM

## 2022-05-27 RX ORDER — M-VIT,TX,IRON,MINS/CALC/FOLIC 27MG-0.4MG
1 TABLET ORAL DAILY
Status: DISCONTINUED | OUTPATIENT
Start: 2022-05-28 | End: 2022-05-28 | Stop reason: HOSPADM

## 2022-05-27 RX ORDER — OXYCODONE HYDROCHLORIDE 5 MG/1
5 TABLET ORAL EVERY 6 HOURS PRN
Status: DISCONTINUED | OUTPATIENT
Start: 2022-05-27 | End: 2022-05-28 | Stop reason: HOSPADM

## 2022-05-27 RX ORDER — FENTANYL CITRATE 50 UG/ML
25 INJECTION, SOLUTION INTRAMUSCULAR; INTRAVENOUS
Status: COMPLETED | OUTPATIENT
Start: 2022-05-27 | End: 2022-05-27

## 2022-05-27 RX ORDER — PROCHLORPERAZINE EDISYLATE 5 MG/ML
5 INJECTION INTRAMUSCULAR; INTRAVENOUS
Status: DISCONTINUED | OUTPATIENT
Start: 2022-05-27 | End: 2022-05-27 | Stop reason: HOSPADM

## 2022-05-27 RX ORDER — FENTANYL CITRATE 50 UG/ML
25 INJECTION, SOLUTION INTRAMUSCULAR; INTRAVENOUS EVERY 5 MIN PRN
Status: DISCONTINUED | OUTPATIENT
Start: 2022-05-27 | End: 2022-05-27 | Stop reason: HOSPADM

## 2022-05-27 RX ORDER — FENTANYL CITRATE 50 UG/ML
50 INJECTION, SOLUTION INTRAMUSCULAR; INTRAVENOUS
Status: COMPLETED | OUTPATIENT
Start: 2022-05-27 | End: 2022-05-27

## 2022-05-27 RX ORDER — ONDANSETRON 2 MG/ML
4 INJECTION INTRAMUSCULAR; INTRAVENOUS EVERY 6 HOURS PRN
Status: DISCONTINUED | OUTPATIENT
Start: 2022-05-27 | End: 2022-05-28 | Stop reason: HOSPADM

## 2022-05-27 RX ORDER — ONDANSETRON 4 MG/1
4 TABLET, ORALLY DISINTEGRATING ORAL EVERY 8 HOURS PRN
Status: DISCONTINUED | OUTPATIENT
Start: 2022-05-27 | End: 2022-05-28 | Stop reason: HOSPADM

## 2022-05-27 RX ORDER — ONDANSETRON 2 MG/ML
INJECTION INTRAMUSCULAR; INTRAVENOUS PRN
Status: DISCONTINUED | OUTPATIENT
Start: 2022-05-27 | End: 2022-05-27 | Stop reason: SDUPTHER

## 2022-05-27 RX ORDER — SODIUM CHLORIDE 9 MG/ML
INJECTION, SOLUTION INTRAVENOUS PRN
Status: DISCONTINUED | OUTPATIENT
Start: 2022-05-27 | End: 2022-05-28 | Stop reason: HOSPADM

## 2022-05-27 RX ORDER — HYDROMORPHONE HYDROCHLORIDE 1 MG/ML
0.5 INJECTION, SOLUTION INTRAMUSCULAR; INTRAVENOUS; SUBCUTANEOUS
Status: DISCONTINUED | OUTPATIENT
Start: 2022-05-27 | End: 2022-05-28 | Stop reason: HOSPADM

## 2022-05-27 RX ORDER — HYDROMORPHONE HYDROCHLORIDE 1 MG/ML
0.25 INJECTION, SOLUTION INTRAMUSCULAR; INTRAVENOUS; SUBCUTANEOUS
Status: DISCONTINUED | OUTPATIENT
Start: 2022-05-27 | End: 2022-05-28 | Stop reason: HOSPADM

## 2022-05-27 RX ORDER — ACETAMINOPHEN 325 MG/1
650 TABLET ORAL EVERY 4 HOURS PRN
Status: DISCONTINUED | OUTPATIENT
Start: 2022-05-27 | End: 2022-05-28 | Stop reason: HOSPADM

## 2022-05-27 RX ORDER — CLOPIDOGREL BISULFATE 75 MG/1
75 TABLET ORAL DAILY
Status: DISCONTINUED | OUTPATIENT
Start: 2022-05-27 | End: 2022-05-28 | Stop reason: HOSPADM

## 2022-05-27 RX ORDER — SODIUM CHLORIDE 0.9 % (FLUSH) 0.9 %
5-40 SYRINGE (ML) INJECTION EVERY 12 HOURS SCHEDULED
Status: DISCONTINUED | OUTPATIENT
Start: 2022-05-27 | End: 2022-05-27 | Stop reason: HOSPADM

## 2022-05-27 RX ORDER — PAROXETINE HYDROCHLORIDE 20 MG/1
20 TABLET, FILM COATED ORAL EVERY MORNING
Status: DISCONTINUED | OUTPATIENT
Start: 2022-05-28 | End: 2022-05-28 | Stop reason: HOSPADM

## 2022-05-27 RX ORDER — PROPOFOL 10 MG/ML
INJECTION, EMULSION INTRAVENOUS PRN
Status: DISCONTINUED | OUTPATIENT
Start: 2022-05-27 | End: 2022-05-27 | Stop reason: SDUPTHER

## 2022-05-27 RX ORDER — PROTAMINE SULFATE 10 MG/ML
INJECTION, SOLUTION INTRAVENOUS PRN
Status: DISCONTINUED | OUTPATIENT
Start: 2022-05-27 | End: 2022-05-27 | Stop reason: SDUPTHER

## 2022-05-27 RX ORDER — DEXAMETHASONE SODIUM PHOSPHATE 10 MG/ML
INJECTION, SOLUTION INTRAMUSCULAR; INTRAVENOUS PRN
Status: DISCONTINUED | OUTPATIENT
Start: 2022-05-27 | End: 2022-05-27 | Stop reason: SDUPTHER

## 2022-05-27 RX ORDER — FENTANYL CITRATE 50 UG/ML
50 INJECTION, SOLUTION INTRAMUSCULAR; INTRAVENOUS EVERY 5 MIN PRN
Status: DISCONTINUED | OUTPATIENT
Start: 2022-05-27 | End: 2022-05-27 | Stop reason: HOSPADM

## 2022-05-27 RX ORDER — DIPHENHYDRAMINE HYDROCHLORIDE 50 MG/ML
12.5 INJECTION INTRAMUSCULAR; INTRAVENOUS
Status: DISCONTINUED | OUTPATIENT
Start: 2022-05-27 | End: 2022-05-27 | Stop reason: HOSPADM

## 2022-05-27 RX ORDER — SODIUM CHLORIDE 0.9 % (FLUSH) 0.9 %
5-40 SYRINGE (ML) INJECTION PRN
Status: DISCONTINUED | OUTPATIENT
Start: 2022-05-27 | End: 2022-05-27 | Stop reason: HOSPADM

## 2022-05-27 RX ORDER — CLONIDINE HYDROCHLORIDE 0.1 MG/1
0.1 TABLET ORAL
Status: DISCONTINUED | OUTPATIENT
Start: 2022-05-27 | End: 2022-05-28 | Stop reason: HOSPADM

## 2022-05-27 RX ORDER — SODIUM CHLORIDE, SODIUM LACTATE, POTASSIUM CHLORIDE, CALCIUM CHLORIDE 600; 310; 30; 20 MG/100ML; MG/100ML; MG/100ML; MG/100ML
INJECTION, SOLUTION INTRAVENOUS CONTINUOUS
Status: DISCONTINUED | OUTPATIENT
Start: 2022-05-27 | End: 2022-05-27

## 2022-05-27 RX ORDER — CALCIUM CHLORIDE 100 MG/ML
INJECTION INTRAVENOUS; INTRAVENTRICULAR PRN
Status: DISCONTINUED | OUTPATIENT
Start: 2022-05-27 | End: 2022-05-27 | Stop reason: SDUPTHER

## 2022-05-27 RX ORDER — ASPIRIN 81 MG/1
81 TABLET ORAL DAILY
Status: DISCONTINUED | OUTPATIENT
Start: 2022-05-27 | End: 2022-05-28 | Stop reason: HOSPADM

## 2022-05-27 RX ORDER — MIDAZOLAM HYDROCHLORIDE 1 MG/ML
2 INJECTION INTRAMUSCULAR; INTRAVENOUS
Status: DISCONTINUED | OUTPATIENT
Start: 2022-05-27 | End: 2022-05-27 | Stop reason: HOSPADM

## 2022-05-27 RX ORDER — LIDOCAINE HYDROCHLORIDE 10 MG/ML
INJECTION, SOLUTION INFILTRATION; PERINEURAL PRN
Status: DISCONTINUED | OUTPATIENT
Start: 2022-05-27 | End: 2022-05-27 | Stop reason: SDUPTHER

## 2022-05-27 RX ORDER — FENTANYL CITRATE 50 UG/ML
INJECTION, SOLUTION INTRAMUSCULAR; INTRAVENOUS PRN
Status: DISCONTINUED | OUTPATIENT
Start: 2022-05-27 | End: 2022-05-27 | Stop reason: SDUPTHER

## 2022-05-27 RX ORDER — HYDROCHLOROTHIAZIDE 25 MG/1
25 TABLET ORAL DAILY
Status: DISCONTINUED | OUTPATIENT
Start: 2022-05-28 | End: 2022-05-28 | Stop reason: HOSPADM

## 2022-05-27 RX ORDER — LIDOCAINE HYDROCHLORIDE 10 MG/ML
1 INJECTION, SOLUTION EPIDURAL; INFILTRATION; INTRACAUDAL; PERINEURAL
Status: DISCONTINUED | OUTPATIENT
Start: 2022-05-27 | End: 2022-05-27 | Stop reason: HOSPADM

## 2022-05-27 RX ORDER — SODIUM CHLORIDE 0.9 % (FLUSH) 0.9 %
5-40 SYRINGE (ML) INJECTION EVERY 12 HOURS SCHEDULED
Status: DISCONTINUED | OUTPATIENT
Start: 2022-05-27 | End: 2022-05-28 | Stop reason: HOSPADM

## 2022-05-27 RX ORDER — RIFAMPIN 600 MG/10ML
INJECTION, POWDER, LYOPHILIZED, FOR SOLUTION INTRAVENOUS PRN
Status: DISCONTINUED | OUTPATIENT
Start: 2022-05-27 | End: 2022-05-27 | Stop reason: ALTCHOICE

## 2022-05-27 RX ORDER — SODIUM CHLORIDE 9 MG/ML
INJECTION, SOLUTION INTRAVENOUS CONTINUOUS
Status: DISCONTINUED | OUTPATIENT
Start: 2022-05-27 | End: 2022-05-27

## 2022-05-27 RX ADMIN — SODIUM CHLORIDE: 9 INJECTION, SOLUTION INTRAVENOUS at 19:16

## 2022-05-27 RX ADMIN — DEXAMETHASONE SODIUM PHOSPHATE 10 MG: 10 INJECTION, SOLUTION INTRAMUSCULAR; INTRAVENOUS at 12:41

## 2022-05-27 RX ADMIN — SUGAMMADEX 200 MG: 100 INJECTION, SOLUTION INTRAVENOUS at 14:44

## 2022-05-27 RX ADMIN — CALCIUM CHLORIDE 0.5 G: 100 INJECTION, SOLUTION INTRAVENOUS at 12:36

## 2022-05-27 RX ADMIN — LISINOPRIL 40 MG: 20 TABLET ORAL at 19:15

## 2022-05-27 RX ADMIN — FENTANYL CITRATE 50 MCG: 50 INJECTION, SOLUTION INTRAMUSCULAR; INTRAVENOUS at 14:21

## 2022-05-27 RX ADMIN — Medication 1000 MG: at 12:41

## 2022-05-27 RX ADMIN — PROPOFOL 130 MG: 10 INJECTION, EMULSION INTRAVENOUS at 12:30

## 2022-05-27 RX ADMIN — PHENYLEPHRINE HYDROCHLORIDE 200 MCG: 10 INJECTION INTRAVENOUS at 12:34

## 2022-05-27 RX ADMIN — SODIUM CHLORIDE, SODIUM LACTATE, POTASSIUM CHLORIDE, AND CALCIUM CHLORIDE: 600; 310; 30; 20 INJECTION, SOLUTION INTRAVENOUS at 10:42

## 2022-05-27 RX ADMIN — HEPARIN SODIUM 6000 UNITS: 1000 INJECTION, SOLUTION INTRAVENOUS; SUBCUTANEOUS at 13:16

## 2022-05-27 RX ADMIN — ROCURONIUM BROMIDE 20 MG: 10 INJECTION, SOLUTION INTRAVENOUS at 13:31

## 2022-05-27 RX ADMIN — ROCURONIUM BROMIDE 10 MG: 10 INJECTION, SOLUTION INTRAVENOUS at 13:41

## 2022-05-27 RX ADMIN — PROTAMINE SULFATE 30 MG: 10 INJECTION, SOLUTION INTRAVENOUS at 14:18

## 2022-05-27 RX ADMIN — FENTANYL CITRATE 50 MCG: 0.05 INJECTION, SOLUTION INTRAMUSCULAR; INTRAVENOUS at 12:02

## 2022-05-27 RX ADMIN — ONDANSETRON 4 MG: 2 INJECTION INTRAMUSCULAR; INTRAVENOUS at 14:36

## 2022-05-27 RX ADMIN — ROCURONIUM BROMIDE 50 MG: 10 INJECTION, SOLUTION INTRAVENOUS at 12:30

## 2022-05-27 RX ADMIN — FENTANYL CITRATE 50 MCG: 50 INJECTION, SOLUTION INTRAMUSCULAR; INTRAVENOUS at 13:01

## 2022-05-27 RX ADMIN — LIDOCAINE HYDROCHLORIDE 50 MG: 10 INJECTION, SOLUTION INFILTRATION; PERINEURAL at 12:30

## 2022-05-27 RX ADMIN — ESMOLOL HYDROCHLORIDE 10 MG: 10 INJECTION, SOLUTION INTRAVENOUS at 14:28

## 2022-05-27 RX ADMIN — FENTANYL CITRATE 50 MCG: 50 INJECTION, SOLUTION INTRAMUSCULAR; INTRAVENOUS at 12:30

## 2022-05-27 RX ADMIN — FENTANYL CITRATE 50 MCG: 50 INJECTION, SOLUTION INTRAMUSCULAR; INTRAVENOUS at 13:32

## 2022-05-27 SDOH — ECONOMIC STABILITY: INCOME INSECURITY: IN THE LAST 12 MONTHS, WAS THERE A TIME WHEN YOU WERE NOT ABLE TO PAY THE MORTGAGE OR RENT ON TIME?: NO

## 2022-05-27 SDOH — HEALTH STABILITY: PHYSICAL HEALTH: ON AVERAGE, HOW MANY MINUTES DO YOU ENGAGE IN EXERCISE AT THIS LEVEL?: 150+ MIN

## 2022-05-27 SDOH — ECONOMIC STABILITY: TRANSPORTATION INSECURITY
IN THE PAST 12 MONTHS, HAS LACK OF TRANSPORTATION KEPT YOU FROM MEETINGS, WORK, OR FROM GETTING THINGS NEEDED FOR DAILY LIVING?: NO

## 2022-05-27 SDOH — HEALTH STABILITY: PHYSICAL HEALTH: ON AVERAGE, HOW MANY DAYS PER WEEK DO YOU ENGAGE IN MODERATE TO STRENUOUS EXERCISE (LIKE A BRISK WALK)?: 5 DAYS

## 2022-05-27 SDOH — ECONOMIC STABILITY: FOOD INSECURITY: WITHIN THE PAST 12 MONTHS, YOU WORRIED THAT YOUR FOOD WOULD RUN OUT BEFORE YOU GOT MONEY TO BUY MORE.: NEVER TRUE

## 2022-05-27 SDOH — ECONOMIC STABILITY: HOUSING INSECURITY
IN THE LAST 12 MONTHS, WAS THERE A TIME WHEN YOU DID NOT HAVE A STEADY PLACE TO SLEEP OR SLEPT IN A SHELTER (INCLUDING NOW)?: NO

## 2022-05-27 SDOH — ECONOMIC STABILITY: FOOD INSECURITY: WITHIN THE PAST 12 MONTHS, THE FOOD YOU BOUGHT JUST DIDN'T LAST AND YOU DIDN'T HAVE MONEY TO GET MORE.: NEVER TRUE

## 2022-05-27 SDOH — ECONOMIC STABILITY: HOUSING INSECURITY: IN THE LAST 12 MONTHS, HOW MANY PLACES HAVE YOU LIVED?: 1

## 2022-05-27 SDOH — ECONOMIC STABILITY: TRANSPORTATION INSECURITY
IN THE PAST 12 MONTHS, HAS THE LACK OF TRANSPORTATION KEPT YOU FROM MEDICAL APPOINTMENTS OR FROM GETTING MEDICATIONS?: YES

## 2022-05-27 ASSESSMENT — PAIN - FUNCTIONAL ASSESSMENT: PAIN_FUNCTIONAL_ASSESSMENT: NONE - DENIES PAIN

## 2022-05-27 ASSESSMENT — PATIENT HEALTH QUESTIONNAIRE - PHQ9
SUM OF ALL RESPONSES TO PHQ9 QUESTIONS 1 & 2: 2
DEPRESSION UNABLE TO ASSESS: YES
1. LITTLE INTEREST OR PLEASURE IN DOING THINGS: SEVERAL DAYS
2. FEELING DOWN, DEPRESSED OR HOPELESS: SEVERAL DAYS

## 2022-05-27 ASSESSMENT — SOCIAL DETERMINANTS OF HEALTH (SDOH)
WITHIN THE LAST YEAR, HAVE YOU BEEN KICKED, HIT, SLAPPED, OR OTHERWISE PHYSICALLY HURT BY YOUR PARTNER OR EX-PARTNER?: NO
WITHIN THE LAST YEAR, HAVE TO BEEN RAPED OR FORCED TO HAVE ANY KIND OF SEXUAL ACTIVITY BY YOUR PARTNER OR EX-PARTNER?: NO
WITHIN THE LAST YEAR, HAVE YOU BEEN HUMILIATED OR EMOTIONALLY ABUSED IN OTHER WAYS BY YOUR PARTNER OR EX-PARTNER?: NO
HOW HARD IS IT FOR YOU TO PAY FOR THE VERY BASICS LIKE FOOD, HOUSING, MEDICAL CARE, AND HEATING?: NOT VERY HARD
WITHIN THE LAST YEAR, HAVE YOU BEEN AFRAID OF YOUR PARTNER OR EX-PARTNER?: NO

## 2022-05-27 ASSESSMENT — ENCOUNTER SYMPTOMS: SHORTNESS OF BREATH: 0

## 2022-05-27 ASSESSMENT — LIFESTYLE VARIABLES
HOW MANY STANDARD DRINKS CONTAINING ALCOHOL DO YOU HAVE ON A TYPICAL DAY: 3 OR 4
HOW OFTEN DO YOU HAVE A DRINK CONTAINING ALCOHOL: 4 OR MORE TIMES A WEEK
SMOKING_STATUS: 1

## 2022-05-27 NOTE — ANESTHESIA PRE PROCEDURE
Department of Anesthesiology  Preprocedure Note       Name:  Jeremi Bellamy   Age:  78 y.o.  :  1945                                          MRN:  [id number]         Date:  2022      Surgeon: Stuart Mast):  Tata Najera MD    Procedure: Procedure(s):  LEFT CAROTID ENDARTERECTOMY    Medications prior to admission:   Prior to Admission medications    Medication Sig Start Date End Date Taking? Authorizing Provider   clopidogrel (PLAVIX) 75 MG tablet Take 1 tablet by mouth daily 22   Ghazal Rosales MD   Multiple Vitamins-Minerals (THERAPEUTIC MULTIVITAMIN-MINERALS) tablet Take 1 tablet by mouth daily    Historical Provider, MD   aspirin 81 MG EC tablet Take 81 mg by mouth daily    Historical Provider, MD   lisinopril (PRINIVIL;ZESTRIL) 40 MG tablet Take 40 mg by mouth daily    Historical Provider, MD   HYDROCHLOROTHIAZIDE PO Take 25 mg by mouth daily     Historical Provider, MD   PARoxetine (PAXIL) 20 MG tablet Take 20 mg by mouth every morning    Historical Provider, MD       Current medications:    No current outpatient medications on file. No current facility-administered medications for this visit. Allergies: Allergies   Allergen Reactions    Beef-Derived Products Hives     Throat swelling    Pork-Derived Products Hives     Throat swelling    Sulfa Antibiotics Other (See Comments)     Childhood allergy.  Don't remember       Problem List:    Patient Active Problem List   Diagnosis Code    Asymptomatic stenosis of right carotid artery I65.21    Atherosclerosis with claudication of extremity ([address] I70.219    PVD (peripheral vascular disease) with claudication ([facility]) I73.9    Atherosclerosis of lower extremity with claudication ([address] I70.219       Past Medical History:        Diagnosis Date    Allergy to alpha-gal     started in the     Arthritis     Atrial fibrillation ([address]     one time episode; doesn't see cardiology office    Blood circulation, collateral     Carot[facility] arterial disease [facility]     CVA (cerebral vascular accident) ([facility]     loss of balance    Depression     Hyperlipidemia     Hypertension        Past Surgical History:        Procedure Laterality Date    CAROTID ENDARTERECTOMY Right 6/15/2021    RIGHT CAROTID ENDARTERECTOMY WITH COMPLETION ANGIOGRAM performed by Chaparro King MD at [address] [facility] Bilateral 2000   [address] [facility] N/A 7/30/2021    RIGHT COMMON FEMORAL AND PROFUNDA ARTERY ENDARTERECTOMY WITH VEIN PATCH RIGHT EXTERNAL ILIAC ENDARTERECTOMY WITH STENT PLACEMENT, LEFT ILIAC STENT PLACEMENT performed by Chaparro King MD at [facility] Left 1996    VASCULAR SURGERY         Social History:    Social History     Tobacco Use    Smoking status: Current Every Day Smoker     Packs/day: 0.25     Years: 55.00     Pack years: 13.75     Types: Cigars     Start date: 1956    Smokeless tobacco: Never Used    Tobacco comment: occ cigar   Substance Use Topics    Alcohol use: Yes     Alcohol/week: 12.0 standard drinks     Types: 12 Cans of beer per week     Comment: weekly                                Ready to quit: Not Answered  Counseling given: Not Answered  Comment: occ cigar      Vital Signs (Current): There were no vitals filed for this visit.                                            BP Readings from Last 3 Encounters:   05/27/22 (!) 167/82   05/17/22 (!) 187/117   08/17/21 83/61       NPO Status:                                                                                 BMI:   Wt Readings from Last 3 Encounters:   05/27/22 147 lb (66.7 kg)   05/20/22 146 lb (66.2 kg)   08/17/21 141 lb (64 kg)     There is no height or weight on file to calculate BMI.    CBC:   Lab Results   Component Value Date    WBC 8.6 05/17/2022    RBC 5.14 05/17/2022    HGB 16.4 05/17/2022    HCT 47.6 05/17/2022    MCV 92.6 05/17/2022    RDW 12.9 05/17/2022     05/17/2022       CMP:   Lab Results   Component Value Date    NA 138 05/17/2022    K 3.8 05/17/2022    CL 98 05/17/2022    CO2 31 05/17/2022    BUN 14 05/17/2022    CREATININE 1.0 05/17/2022    GFRAA >59 05/17/2022    LABGLOM >60 05/17/2022    GLUCOSE 125 05/17/2022    PROT 8.0 05/17/2022    CALCIUM 10.0 05/17/2022    BILITOT 0.4 05/17/2022    ALKPHOS 112 05/17/2022    AST 25 05/17/2022    ALT 20 05/17/2022       POC Tests: No results for input(s): POCGLU, POCNA, POCK, POCCL, POCBUN, POCHEMO, POCHCT in the last 72 hours. Coags:   Lab Results   Component Value Date    PROTIME 12.7 05/17/2022    INR 0.96 05/17/2022    APTT 27.4 05/20/2022       HCG (If Applicable): No results found for: PREGTESTUR, PREGSERUM, HCG, HCGQUANT     ABGs: No results found for: PHART, PO2ART, VLR0MKJ, DKO6AXS, BEART, A6BBCBNU     Type & Screen (If Applicable):  No results found for: LABABO, LABRH    Drug/Infectious Status (If Applicable):  No results found for: HIV, HEPCAB    COVID-19 Screening (If Applicable): No results found for: COVID19        Anesthesia Evaluation  Patient summary reviewed and Nursing notes reviewed no history of anesthetic complications:   Airway: Mallampati: I  TM distance: >3 FB   Neck ROM: full  Mouth opening: > = 3 FB   Dental:    (+) edentulous      Pulmonary:   (+) current smoker    (-) asthma, shortness of breath and sleep apnea          Patient smoked on day of surgery. ROS comment: CXR:  1. No radiographic evidence of acute cardiopulmonary process. Cardiovascular:  Exercise tolerance: good (>4 METS),   (+) hypertension:, dysrhythmias (following right CEA pt with episode of Afib with RVR. echo unremarkable. Pt is stable from a cardiology standopoint today.   Pt is not currently on HR control medications.): atrial fibrillation, hyperlipidemia    (-) pacemaker, past MI, CAD, CABG/stent and  angina    ECG reviewed               Beta Blocker:  Not on Beta Blocker      ROS comment: EKG;  77 BPM  Sinus rhythm  Lateral ST-T abnormality is nonspecific  Comparison Summary: No serial comparison made  Summary: Borderline ECG      Echo:  Summary   Mitral valve leaflets are mildly thickened with preserved leaflet   mobility. Mildly thickened aortic valve leaflets with preserved leaflet mobility. Tricuspid valve is structurally normal.   Normal left ventricular size with preserved LV function and an estimated   ejection fraction of approximately 55-60%. Mild concentric left ventricular hypertrophy. Impaired relaxation compatible with diastolic dysfunction. ( reversed E/A   ratio)   No regional wall motion abnormalities. Neuro/Psych:   (+) CVA: no interval change, psychiatric history:   (-) seizures            ROS comment: CT head:  1. No acute intracranial abnormality is seen.       Recent stroke like sx earlier this month. GI/Hepatic/Renal:        (-) GERD, liver disease and no renal disease       Endo/Other:        (-) diabetes mellitus, blood dyscrasia (plavix use), no electrolyte abnormalities               Abdominal:             Vascular:   + PVD, aortic or cerebral, .  - DVT (? DVT in both legs?) and PE.       ROS comment: Impression:  1.  80% narrowing of the RIGHT internal carotid artery origin and 60%  narrowing of the LEFT internal carotid artery origin secondary to  heavy atherosclerotic plaque and calcification. 2.  Only faint opacification of the LEFT vertebral artery with  appearance of occlusion distally near foramen magnum. 3.  Severe multilevel cervical spine degenerative change. Signed by Dr Carlos Tucker on 5/25/2021 3:28 PM    . Other Findings:             Anesthesia Plan      general     ASA 4       Induction: intravenous. arterial line  MIPS: Postoperative opioids intended and Prophylactic antiemetics administered. Anesthetic plan and risks discussed with patient. Use of blood products discussed with patient whom consented to blood products.                      Juan Ibarra DO   5/27/2022

## 2022-05-27 NOTE — OP NOTE
Preoperative Diagnosis:  1. Symptomatic left carotid artery stenosis    Post Op Diagnosis: Same    Operative Procedure:  1. Left carotid endarterectomy with bovine pericardial patch  2. Left cervical carotid arteriograms    Anesthesia:  General endotracheal    Estimated Blood Loss:  100 mL    Specimens:  Plaque to pathology    Drains:  Fluted SAMEER right neck wound    Findings:   1. There was at least 80 percent stenosis of the left internal carotid artery. 2.  The stenosis extended 4 cm beyond the carotid bulb. 3.  Post cervical carotid arteriogram showed a widely patent internal carotid artery. The external carotid artery remains patent. Procedure in detail:    After the patient was consented and given intravenous antibiotics, he was brought to the operating room and placed on the operating room table in the supine position. General endotracheal anesthesia was achieved. The patient's left neck was prepped and draped in the usual sterile procedure. A curvilinear incision in the left neck was made anterior to the sternocleidomastoid with scalpel. This was carried through subcutaneous tissue and platysma with bovie electrocautery. Facial vein branches are transected between hemostats and ligated with 3-0 Vicryl and 4-0 Vicryl sutures and/or clips. The patient was given intravenous heparin and the left common carotid and superior thyroid arteries were then dissected circumferentially and vesseloops placed for future vascular control. Finally, the internal carotid artery is dissected  and vessel loops were placed for future vascular control. Ansa cervicalis was not transected between clips. The hypoglossal and vagus nerves were carefully identified and protected throughout this dissection. After adequate exposure and heparinization time, the distal internal, common and external carotid arteries are clamped.   A longitudinal arteriotomy made in the common carotid artery with 11 blade and carried proximally with Ham scissors and distally along the course of the internal carotid artery and beyond the plaque. The artery was irrigated with heparinized saline and then a Sundt shunt was placed in the internal and then the common carotid artery so flow was restored to the left brain. [place] elevator was used to remove plaque from the common carotid artery and the plaque was cut flush with the proximal arteriotomy with Ham scissors. An eversion endarterectomy was performed of the external carotid artery with excellent back bleeding. The plaque was removed from the internal carotid artery. The distal intima was tacked using 7-0 Prolene sutures. The plaque was passed off the field as specimen and then any additional debris is removed under loupe magnification with small forceps. After all the debris was removed, the area was irrigated with heparinized saline and then a bovine pericardial patch of the common and internal carotid arteries was performed with 6-0 Prolene running suture. Prior to competing this patch repair, the Sundt shunt was removed and standard flushing techniques were used to remove any air and debris from the native vessels and flow was restored to the left brain. A butterfly needle was placed in the common carotid artery and left cervical carotid arteriograms were performed with above findings noted. The left ECA is occluded several centimeters beyond the origin. Therefore, clamps were placed proximally and distally on the external carotid artery only. Longitudinal arteriotomy was made. Additional plaque was removed and then tacked distally with 7-0 Prolene interrupted sutures. The artery was closed longitudinally primarily with 6-0 Prolene running suture. Prior to completing this repair, standard flushing techniques were used to remove air and debris from the native vessel here. Denies pulse beyond the distal endpoint now. Hemostasis is excellent in the neck wound.     A fluted SAMEER drain was placed through a separate stab incision in the patient's neck and secured with heavy silk suture. Platysma was closed over the drain with 3-0 PDS running suture. Skin was closed with 4-0 monocryl subcuticular sutures and dermabond skin adhesive. The patient tolerated the procedure well. He was extubated, following all commands in the operating room and moving all extremities. He was brought to the recovery room in good condition.

## 2022-05-27 NOTE — ANESTHESIA PROCEDURE NOTES
Arterial Line:    An arterial line was placed using ultrasound guidance and surface landmarks, in the pre-op for the following indication(s): continuous blood pressure monitoring and blood sampling needed. A 20 gauge (size), 1 and 3/4 inch (length), Arrow (type) catheter was placed, Seldinger technique used, into the left radial artery and a [employer] Arterial Cannulation Support device was used for positioning, secured by tape and Tegaderm.   Anesthesia type: Local  Local infiltration: Injection    Events:  patient tolerated procedure well with no complications and EBL < 1RK.3/[id number]/[id number] 12:07 PM5/27/2022 12:07 PM  Anesthesiologist: Frida Israel DO  Performed: Anesthesiologist   Preanesthetic Checklist  Completed: patient identified, IV checked, site marked, risks and benefits discussed, surgical/procedural consents, equipment checked, pre-op evaluation, timeout performed, anesthesia consent given, oxygen available and monitors applied/VS acknowledged

## 2022-05-28 VITALS
HEART RATE: 81 BPM | HEIGHT: 67 IN | RESPIRATION RATE: 20 BRPM | OXYGEN SATURATION: 94 % | WEIGHT: 147 LBS | SYSTOLIC BLOOD PRESSURE: 115 MMHG | DIASTOLIC BLOOD PRESSURE: 78 MMHG | BODY MASS INDEX: 23.07 KG/M2 | TEMPERATURE: 97.4 F

## 2022-05-28 LAB
ANION GAP SERPL CALCULATED.3IONS-SCNC: 8 MMOL/L (ref 7–19)
BASOPHILS ABSOLUTE: 0 K/UL (ref 0–0.2)
BASOPHILS RELATIVE PERCENT: 0.1 % (ref 0–1)
BUN BLDV-MCNC: 20 MG/DL (ref 8–23)
CALCIUM SERPL-MCNC: 9.1 MG/DL (ref 8.8–10.2)
CHLORIDE BLD-SCNC: 101 MMOL/L (ref 98–111)
CO2: 27 MMOL/L (ref 22–29)
CREAT SERPL-MCNC: 1.3 MG/DL (ref 0.5–1.2)
EOSINOPHILS ABSOLUTE: 0 K/UL (ref 0–0.6)
EOSINOPHILS RELATIVE PERCENT: 0 % (ref 0–5)
GFR AFRICAN AMERICAN: >59
GFR NON-AFRICAN AMERICAN: 54
GLUCOSE BLD-MCNC: 155 MG/DL (ref 74–109)
HCT VFR BLD CALC: 35.5 % (ref 42–52)
HEMOGLOBIN: 11.7 G/DL (ref 14–18)
IMMATURE GRANULOCYTES #: 0.1 K/UL
LYMPHOCYTES ABSOLUTE: 1.3 K/UL (ref 1.1–4.5)
LYMPHOCYTES RELATIVE PERCENT: 13.3 % (ref 20–40)
MCH RBC QN AUTO: 32.1 PG (ref 27–31)
MCHC RBC AUTO-ENTMCNC: 33 G/DL (ref 33–37)
MCV RBC AUTO: 97.3 FL (ref 80–94)
MONOCYTES ABSOLUTE: 0.8 K/UL (ref 0–0.9)
MONOCYTES RELATIVE PERCENT: 7.8 % (ref 0–10)
NEUTROPHILS ABSOLUTE: 7.6 K/UL (ref 1.5–7.5)
NEUTROPHILS RELATIVE PERCENT: 78.3 % (ref 50–65)
PDW BLD-RTO: 13.2 % (ref 11.5–14.5)
PLATELET # BLD: 253 K/UL (ref 130–400)
PMV BLD AUTO: 10.2 FL (ref 9.4–12.4)
POTASSIUM SERPL-SCNC: 5.1 MMOL/L (ref 3.5–5)
RBC # BLD: 3.65 M/UL (ref 4.7–6.1)
SODIUM BLD-SCNC: 136 MMOL/L (ref 136–145)
WBC # BLD: 9.7 K/UL (ref 4.8–10.8)

## 2022-05-28 PROCEDURE — 36415 COLL VENOUS BLD VENIPUNCTURE: CPT

## 2022-05-28 PROCEDURE — 6370000000 HC RX 637 (ALT 250 FOR IP): Performed by: SURGERY

## 2022-05-28 PROCEDURE — 85025 COMPLETE CBC W/AUTO DIFF WBC: CPT

## 2022-05-28 PROCEDURE — 2580000003 HC RX 258: Performed by: SURGERY

## 2022-05-28 PROCEDURE — 80048 BASIC METABOLIC PNL TOTAL CA: CPT

## 2022-05-28 PROCEDURE — 6360000002 HC RX W HCPCS: Performed by: SURGERY

## 2022-05-28 RX ADMIN — MULTIPLE VITAMINS W/ MINERALS TAB 1 TABLET: TAB at 08:06

## 2022-05-28 RX ADMIN — LISINOPRIL 40 MG: 20 TABLET ORAL at 08:06

## 2022-05-28 RX ADMIN — PAROXETINE HYDROCHLORIDE 20 MG: 20 TABLET, FILM COATED ORAL at 08:06

## 2022-05-28 RX ADMIN — HYDROCHLOROTHIAZIDE 25 MG: 25 TABLET ORAL at 08:06

## 2022-05-28 RX ADMIN — ASPIRIN 81 MG: 81 TABLET, COATED ORAL at 08:06

## 2022-05-28 RX ADMIN — Medication 1000 MG: at 00:54

## 2022-05-28 RX ADMIN — CLOPIDOGREL BISULFATE 75 MG: 75 TABLET ORAL at 08:06

## 2022-05-28 RX ADMIN — SODIUM CHLORIDE, PRESERVATIVE FREE 10 ML: 5 INJECTION INTRAVENOUS at 08:06

## 2022-05-28 NOTE — DISCHARGE SUMMARY
Physician Discharge Summary          Patient ID:  Sun Barr  [id number]  89 y.o.  1945    Date of Admission:  5/27/2022    Date of Discharge:  5/28/2022    Admitting Physician:  Roxana Bullard. Cassandra Quinn M.D. Primary Diagnosis:    1. Symptomatic left Carotid Stenosis    Other Diagnoses:    1. Atherosclerosis with claudication  2. Essential HTN    Operative Procedures:      Preoperative Diagnosis:  1. Symptomatic left carotid artery stenosis     Post Op Diagnosis: Same     Operative Procedure:  1. Left carotid endarterectomy with bovine pericardial patch  2. Left cervical carotid arteriograms     Anesthesia:  General endotracheal     Estimated Blood Loss:  100 mL     Specimens:  Plaque to pathology     Drains:  Fluted SAMEER right neck wound     Findings:   1. There was at least 80 percent stenosis of the left internal carotid artery. 2.  The stenosis extended 4 cm beyond the carotid bulb. 3.  Post cervical carotid arteriogram showed a widely patent internal carotid artery. The external carotid artery remains patent.     Procedure in detail:     After the patient was consented and given intravenous antibiotics, he was brought to the operating room and placed on the operating room table in the supine position. General endotracheal anesthesia was achieved. The patient's left neck was prepped and draped in the usual sterile procedure. A curvilinear incision in the left neck was made anterior to the sternocleidomastoid with scalpel. This was carried through subcutaneous tissue and platysma with bovie electrocautery. Facial vein branches are transected between hemostats and ligated with 3-0 Vicryl and 4-0 Vicryl sutures and/or clips. The patient was given intravenous heparin and the left common carotid and superior thyroid arteries were then dissected circumferentially and vesseloops placed for future vascular control.   Finally, the internal carotid artery is dissected  and vessel loops were placed for future vascular control. Ansa cervicalis was not transected between clips. The hypoglossal and vagus nerves were carefully identified and protected throughout this dissection.      After adequate exposure and heparinization time, the distal internal, common and external carotid arteries are clamped. A longitudinal arteriotomy made in the common carotid artery with 11 blade and carried proximally with Ham scissors and distally along the course of the internal carotid artery and beyond the plaque. The artery was irrigated with heparinized saline and then a Sundt shunt was placed in the internal and then the common carotid artery so flow was restored to the left brain.       [place] elevator was used to remove plaque from the common carotid artery and the plaque was cut flush with the proximal arteriotomy with Ham scissors. An eversion endarterectomy was performed of the external carotid artery with excellent back bleeding. The plaque was removed from the internal carotid artery. The distal intima was tacked using 7-0 Prolene sutures. The plaque was passed off the field as specimen and then any additional debris is removed under loupe magnification with small forceps. After all the debris was removed, the area was irrigated with heparinized saline and then a bovine pericardial patch of the common and internal carotid arteries was performed with 6-0 Prolene running suture. Prior to competing this patch repair, the Sundt shunt was removed and standard flushing techniques were used to remove any air and debris from the native vessels and flow was restored to the left brain.        A butterfly needle was placed in the common carotid artery and left cervical carotid arteriograms were performed with above findings noted. The left ECA is occluded several centimeters beyond the origin. Therefore, clamps were placed proximally and distally on the external carotid artery only. Longitudinal arteriotomy was made. Additional plaque was removed and then tacked distally with 7-0 Prolene interrupted sutures. The artery was closed longitudinally primarily with 6-0 Prolene running suture. Prior to completing this repair, standard flushing techniques were used to remove air and debris from the native vessel here. Denies pulse beyond the distal endpoint now. Hemostasis is excellent in the neck wound.     A fluted SAMEER drain was placed through a separate stab incision in the patient's neck and secured with heavy silk suture. Platysma was closed over the drain with 3-0 PDS running suture. Skin was closed with 4-0 monocryl subcuticular sutures and dermabond skin adhesive. The patient tolerated the procedure well. He was extubated, following all commands in the operating room and moving all extremities. He was brought to the recovery room in good condition. History and Physical:    See History and Physical on admission without additions nor deletions. Hospital Course:    He was admitted after the above stated procedure (please see operative note for details of procedure). The SAMEER drain was removed on POD#1 with minimal output. On physical exam, His voice sounds ok after CEA and general endotracheal anesthesia. He is alert and oriented and His speech is normal,  His left neck wound is c/d/i without hematoma,  PERRL, EOMI, face symmetric, tongue midline, SHEIKH 5/5 strength. Discharge Instructions:    Discharge instructions were discussed with the patient prior to discharge. The patient was also given written discharge instructions. Discharge Medications:    See Epic for discharge medication list, including any new prescriptions. I escribed a Rx for percocet 5 (#20) to [facility]. Follow-Up:    He will follow-up with our office in 2-3 weeks. He can call with any questions or concerns. Condition on discharge is good. Disposition to home with excellent prognosis.     Signed:  Ana Morales MD  5/28/2022  9:17 AM

## 2022-05-28 NOTE — PLAN OF CARE
Problem: Discharge Planning  Goal: Discharge to home or other facility with appropriate resources  5/28/2022 0645 by Marielena Garvey RN  Outcome: Progressing  5/28/2022 0644 by Marielena Garvey RN  Outcome: Progressing  Flowsheets  Taken 5/27/2022 2000 by Marielena Garvey RN  Discharge to home or other facility with appropriate resources: Identify barriers to discharge with patient and caregiver  Taken 5/27/2022 1834 by Rachana Varghese RN  Discharge to home or other facility with appropriate resources:   Identify barriers to discharge with patient and caregiver   Identify discharge learning needs (meds, wound care, etc)   Refer to discharge planning if patient needs post-hospital services based on physician order or complex needs related to functional status, cognitive ability or social support system   Arrange for needed discharge resources and transportation as appropriate   Arrange for interpreters to assist at discharge as needed     Problem: Safety - Adult  Goal: Free from fall injury  5/28/2022 0645 by Marielena Garvey RN  Outcome: Progressing  5/28/2022 0644 by Marielena Garvey RN  Outcome: Progressing  Flowsheets (Taken 5/28/2022 3062)  Free From Fall Injury: Instruct family/caregiver on patient safety     Problem: ABCDS Injury Assessment  Goal: Absence of physical injury  5/28/2022 0645 by Marielena Garvey RN  Outcome: Progressing  5/28/2022 0644 by Marielena Garvey RN  Outcome: Progressing

## 2022-05-28 NOTE — PLAN OF CARE
Problem: Discharge Planning  Goal: Discharge to home or other facility with appropriate resources  Outcome: Progressing  Flowsheets  Taken 5/27/2022 2000 by Alla Medina RN  Discharge to home or other facility with appropriate resources: Identify barriers to discharge with patient and caregiver  Taken 5/27/2022 1834 by David Birch RN  Discharge to home or other facility with appropriate resources:   Identify barriers to discharge with patient and caregiver   Identify discharge learning needs (meds, wound care, etc)   Refer to discharge planning if patient needs post-hospital services based on physician order or complex needs related to functional status, cognitive ability or social support system   Arrange for needed discharge resources and transportation as appropriate   Arrange for interpreters to assist at discharge as needed     Problem: Safety - Adult  Goal: Free from fall injury  Outcome: Progressing  Flowsheets (Taken 5/28/2022 0643)  Free From Fall Injury: Instruct family/caregiver on patient safety     Problem: ABCDS Injury Assessment  Goal: Absence of physical injury  Outcome: Progressing

## 2022-05-29 ENCOUNTER — READMISSION MANAGEMENT (OUTPATIENT)
Dept: CALL CENTER | Facility: HOSPITAL | Age: 77
End: 2022-05-29

## 2022-05-29 NOTE — OUTREACH NOTE
Prep Survey    Flowsheet Row Responses   [facility] facility patient discharged from? Non-BH   Is LACE score < 7 ? Non-BH Discharge   Emergency Room discharge w/ pulse ox? No   Eligibility [facility]   Hospital [facility]   Date of Discharge 05/28/22   Discharge Disposition Home or Self Care   Discharge diagnosis Unavailable   Does the patient have one of the following disease processes/diagnoses(primary or secondary)? Other   Prep survey completed? Yes          TONE WALKER - Registered Nurse

## 2022-05-31 ENCOUNTER — TRANSITIONAL CARE MANAGEMENT TELEPHONE ENCOUNTER (OUTPATIENT)
Dept: CALL CENTER | Facility: HOSPITAL | Age: 77
End: 2022-05-31

## 2022-05-31 NOTE — OUTREACH NOTE
Call Center TCM Note    Flowsheet Row Responses   [facility] patient discharged from? Non-BH   Does the patient have one of the following disease processes/diagnoses(primary or secondary)? Other   TCM attempt successful? No  [Rahel-ex and Mitali-daughter ]   Unsuccessful attempts Attempt 1          Christa Macias RN    5/31/2022, 08:36 CDT

## 2022-05-31 NOTE — OUTREACH NOTE
Call Center TCM Note    Flowsheet Row Responses   [facility] patient discharged from? Non-BH   Does the patient have one of the following disease processes/diagnoses(primary or secondary)? Other   TCM attempt successful? No   Unsuccessful attempts Attempt 2          Jyoti Morgan RN    5/31/2022, 10:20 EDT

## 2022-06-01 ENCOUNTER — TRANSITIONAL CARE MANAGEMENT TELEPHONE ENCOUNTER (OUTPATIENT)
Dept: CALL CENTER | Facility: HOSPITAL | Age: 77
End: 2022-06-01

## 2022-06-01 NOTE — OUTREACH NOTE
Call Center TCM Note    Flowsheet Row Responses   [facility] patient discharged from? Non-BH   Does the patient have one of the following disease processes/diagnoses(primary or secondary)? Other   TCM attempt successful? No  [Rahel, EX,  [place], daughter]   Unsuccessful attempts Attempt 3          Rupal Castro RN    6/1/2022, 11:38 CDT

## 2022-06-28 ENCOUNTER — TELEPHONE (OUTPATIENT)
Dept: FAMILY MEDICINE CLINIC | Facility: CLINIC | Age: 77
End: 2022-06-28

## 2022-06-28 NOTE — TELEPHONE ENCOUNTER
Noted and they will send us another fax regarding this they asked he be placed on highter dose,I told them pt needs an appt he has not been seen in almost 1 yr

## 2022-06-28 NOTE — TELEPHONE ENCOUNTER
[facility] IS CALLING TO SEE IF MODERATE OR HIGH INTENSITY STATIN THERAPY WOULD BE A FIT FOR PATIENTS CARDIO VASCULAR DISEASE     GOOD CALL BACK [phone]

## 2022-08-03 ENCOUNTER — TELEPHONE (OUTPATIENT)
Dept: FAMILY MEDICINE CLINIC | Facility: CLINIC | Age: 77
End: 2022-08-03

## 2022-08-15 ENCOUNTER — OFFICE VISIT (OUTPATIENT)
Dept: FAMILY MEDICINE CLINIC | Facility: CLINIC | Age: 77
End: 2022-08-15

## 2022-08-15 ENCOUNTER — LAB (OUTPATIENT)
Dept: FAMILY MEDICINE CLINIC | Facility: CLINIC | Age: 77
End: 2022-08-15

## 2022-08-15 VITALS
HEIGHT: 67 IN | OXYGEN SATURATION: 94 % | WEIGHT: 158 LBS | HEART RATE: 97 BPM | SYSTOLIC BLOOD PRESSURE: 106 MMHG | BODY MASS INDEX: 24.8 KG/M2 | DIASTOLIC BLOOD PRESSURE: 66 MMHG

## 2022-08-15 DIAGNOSIS — Z12.11 SCREEN FOR COLON CANCER: ICD-10-CM

## 2022-08-15 DIAGNOSIS — Z72.0 TOBACCO USE: ICD-10-CM

## 2022-08-15 DIAGNOSIS — I77.9 CAROTID ARTERY DISEASE, UNSPECIFIED LATERALITY, UNSPECIFIED TYPE: Primary | ICD-10-CM

## 2022-08-15 DIAGNOSIS — Z12.5 SCREENING FOR MALIGNANT NEOPLASM OF PROSTATE: ICD-10-CM

## 2022-08-15 DIAGNOSIS — R93.3 ABNORMAL FINDINGS ON DIAGNOSTIC IMAGING OF OTHER PARTS OF DIGESTIVE TRACT: ICD-10-CM

## 2022-08-15 PROCEDURE — 1170F FXNL STATUS ASSESSED: CPT | Performed by: FAMILY MEDICINE

## 2022-08-15 PROCEDURE — G0439 PPPS, SUBSEQ VISIT: HCPCS | Performed by: FAMILY MEDICINE

## 2022-08-15 PROCEDURE — 1126F AMNT PAIN NOTED NONE PRSNT: CPT | Performed by: FAMILY MEDICINE

## 2022-08-15 PROCEDURE — 1159F MED LIST DOCD IN RCRD: CPT | Performed by: FAMILY MEDICINE

## 2022-08-15 NOTE — PROGRESS NOTES
"Subjective   Crys Sky is a 76 y.o. male.     Chief Complaint   Patient presents with   • Medicare Wellness-subsequent        History of Present Illness     hs feels he is doing well--h recentlhy had carotid artery surgery      Current Outpatient Medications:   •  aspirin 81 MG EC tablet, Take 81 mg by mouth., Disp: , Rfl:   •  clopidogrel (PLAVIX) 75 MG tablet, , Disp: , Rfl:   •  HYDROCHLOROTHIAZIDE PO, Take 25 mg by mouth., Disp: , Rfl:   •  lisinopril (PRINIVIL,ZESTRIL) 40 MG tablet, Take 40 mg by mouth., Disp: , Rfl:   •  multivitamin with minerals tablet tablet, Take 1 tablet by mouth., Disp: , Rfl:   •  PARoxetine (PAXIL) 20 MG tablet, Take 20 mg by mouth., Disp: , Rfl:   •  rosuvastatin (CRESTOR) 10 MG tablet, , Disp: , Rfl:   Allergies   Allergen Reactions   • Sulfa Antibiotics Rash     Childhood allergy   • Beef-Derived Products Hives   • Pork-Derived Products Hives       BMI is within normal parameters. No other follow-up for BMI required.      No past medical history on file.  No past surgical history on file.    Review of Systems   Constitutional: Negative.    HENT: Negative.    Eyes: Negative.    Respiratory: Negative.    Cardiovascular: Negative.    Gastrointestinal: Negative.    Endocrine: Negative.    Genitourinary: Negative.    Musculoskeletal: Negative.    Skin: Negative.    Allergic/Immunologic: Negative.    Neurological: Negative.    Hematological: Negative.    Psychiatric/Behavioral: Negative.        Objective  /66   Pulse 97   Ht 170.2 cm (67\")   Wt 71.7 kg (158 lb)   SpO2 94%   BMI 24.75 kg/m²   Physical Exam  Vitals and nursing note reviewed.   Constitutional:       Appearance: Normal appearance. He is normal weight.   HENT:      Head: Normocephalic and atraumatic.      Nose: Nose normal.      Mouth/Throat:      Mouth: Mucous membranes are moist.   Eyes:      Extraocular Movements: Extraocular movements intact.      Conjunctiva/sclera: Conjunctivae normal.      Pupils: Pupils " are equal, round, and reactive to light.   Cardiovascular:      Rate and Rhythm: Normal rate and regular rhythm.      Pulses: Normal pulses.      Heart sounds: Normal heart sounds.   Pulmonary:      Effort: Pulmonary effort is normal.      Breath sounds: Normal breath sounds.   Abdominal:      General: Abdomen is flat. Bowel sounds are normal.      Palpations: Abdomen is soft.   Musculoskeletal:         General: Normal range of motion.      Cervical back: Normal range of motion and neck supple.   Skin:     General: Skin is warm and dry.      Capillary Refill: Capillary refill takes less than 2 seconds.   Neurological:      General: No focal deficit present.      Mental Status: He is alert and oriented to person, place, and time. Mental status is at baseline.   Psychiatric:         Mood and Affect: Mood normal.         Behavior: Behavior normal.         Thought Content: Thought content normal.         Judgment: Judgment normal.         Assessment & Plan   Diagnoses and all orders for this visit:    1. Carotid artery disease, unspecified laterality, unspecified type (HCC) (Primary)  -     CBC & Differential  -     Comprehensive metabolic panel  -     Lipid Panel With / Chol / HDL Ratio  -     PSA Screen    2. Screening for malignant neoplasm of prostate  -     PSA Screen    3. Abnormal findings on diagnostic imaging of other parts of digestive tract   -     Lipid Panel With / Chol / HDL Ratio    4. Screen for colon cancer  -     Cologuard - Stool, Per Rectum    5. Tobacco use      Crys Sky  reports that he has been smoking cigars. He has a 150.00 pack-year smoking history. He has never used smokeless tobacco.. I have educated him on the risk of diseases from using tobacco products such as cancer, COPD and heart disease.     I advised him to quit and he is not willing to quit.    I spent 4.5 minutes counseling the patient.                Orders Placed This Encounter   Procedures   • Comprehensive metabolic panel      Order Specific Question:   Release to patient     Answer:   Routine Release   • Lipid Panel With / Chol / HDL Ratio     Order Specific Question:   Release to patient     Answer:   Routine Release   • PSA Screen     Order Specific Question:   Release to patient     Answer:   Routine Release   • Cologuard - Stool, Per Rectum     Order Specific Question:   Release to patient     Answer:   Routine Release   • CBC & Differential       Follow up: 4 month(s)

## 2022-08-15 NOTE — PROGRESS NOTES
The ABCs of the Annual Wellness Visit  Subsequent Medicare Wellness Visit    Chief Complaint   Patient presents with   • Medicare Wellness-subsequent      Subjective    History of Present Illness:  Crys Sky is a 76 y.o. male who presents for a Subsequent Medicare Wellness Visit.    The following portions of the patient's history were reviewed and   updated as appropriate: allergies, current medications, past family history, past medical history, past social history, past surgical history and problem list.    Compared to one year ago, the patient feels his physical   health is the same.    Compared to one year ago, the patient feels his mental   health is the same.    Recent Hospitalizations:  He was admitted within the past 365 days at [facility].       Current Medical Providers:  Patient Care Team:  Steven Silverio MD as PCP - General (Family Medicine)    Outpatient Medications Prior to Visit   Medication Sig Dispense Refill   • aspirin 81 MG EC tablet Take 81 mg by mouth.     • clopidogrel (PLAVIX) 75 MG tablet      • HYDROCHLOROTHIAZIDE PO Take 25 mg by mouth.     • lisinopril (PRINIVIL,ZESTRIL) 40 MG tablet Take 40 mg by mouth.     • multivitamin with minerals tablet tablet Take 1 tablet by mouth.     • PARoxetine (PAXIL) 20 MG tablet Take 20 mg by mouth.     • rosuvastatin (CRESTOR) 10 MG tablet        No facility-administered medications prior to visit.       No opioid medication identified on active medication list. I have reviewed chart for other potential  high risk medication/s and harmful drug interactions in the elderly.          Aspirin is on active medication list. Aspirin use is indicated based on review of current medical condition/s. Pros and cons of this therapy have been discussed today. Benefits of this medication outweigh potential harm.  Patient has been encouraged to continue taking this medication.  .      There is no problem list on file for this patient.    Advance Care  "Planning  Advance Directive is not on file.  ACP discussion was held with the patient during this visit. Patient does not have an advance directive, information provided.          Objective    Vitals:    08/15/22 1117   BP: 106/66   Pulse: 97   SpO2: 94%   Weight: 71.7 kg (158 lb)   Height: 170.2 cm (67\")     Estimated body mass index is 24.75 kg/m² as calculated from the following:    Height as of this encounter: 170.2 cm (67\").    Weight as of this encounter: 71.7 kg (158 lb).    BMI is within normal parameters. No other follow-up for BMI required.      Does the patient have evidence of cognitive impairment? No    Physical Exam            HEALTH RISK ASSESSMENT    Smoking Status:  Social History     Tobacco Use   Smoking Status Current Every Day Smoker   • Packs/day: 3.00   • Years: 50.00   • Pack years: 150.00   • Types: Cigars   Smokeless Tobacco Never Used     Alcohol Consumption:  Social History     Substance and Sexual Activity   Alcohol Use Not on file     Fall Risk Screen:    RAMBO Fall Risk Assessment was completed, and patient is at HIGH risk for falls. Assessment completed on:8/15/2022    Depression Screening:  PHQ-2/PHQ-9 Depression Screening 8/15/2022   Retired PHQ-9 Total Score -   Retired Total Score -   Little Interest or Pleasure in Doing Things 0-->not at all   Feeling Down, Depressed or Hopeless 0-->not at all   PHQ-9: Brief Depression Severity Measure Score 0       Health Habits and Functional and Cognitive Screening:  Functional & Cognitive Status 8/15/2022   Do you have difficulty preparing food and eating? No   Do you have difficulty bathing yourself, getting dressed or grooming yourself? No   Do you have difficulty using the toilet? No   Do you have difficulty moving around from place to place? No   Do you have trouble with steps or getting out of a bed or a chair? No   Current Diet Well Balanced Diet   Dental Exam Other   Eye Exam Not up to date   Exercise (times per week) 2 times per week "   Current Exercises Include Walking;Yard Work   Do you need help using the phone?  No   Are you deaf or do you have serious difficulty hearing?  No   Do you need help with transportation? No   Do you need help shopping? No   Do you need help preparing meals?  No   Do you need help with housework?  No   Do you need help with laundry? No   Do you need help taking your medications? No   Do you need help managing money? No   Do you ever drive or ride in a car without wearing a seat belt? No   Have you felt unusual stress, anger or loneliness in the last month? No   Who do you live with? Alone   If you need help, do you have trouble finding someone available to you? No   Have you been bothered in the last four weeks by sexual problems? No   Do you have difficulty concentrating, remembering or making decisions? No       Age-appropriate Screening Schedule:  Refer to the list below for future screening recommendations based on patient's age, sex and/or medical conditions. Orders for these recommended tests are listed in the plan section. The patient has been provided with a written plan.    Health Maintenance   Topic Date Due   • TDAP/TD VACCINES (1 - Tdap) Never done   • ZOSTER VACCINE (1 of 2) Never done   • LIPID PANEL  06/15/2022   • INFLUENZA VACCINE  10/01/2022              Assessment & Plan   CMS Preventative Services Quick Reference  Risk Factors Identified During Encounter  Cardiovascular Disease  The above risks/problems have been discussed with the patient.  Follow up actions/plans if indicated are seen below in the Assessment/Plan Section.  Pertinent information has been shared with the patient in the After Visit Summary.    Diagnoses and all orders for this visit:    1. Carotid artery disease, unspecified laterality, unspecified type (HCC) (Primary)  -     CBC & Differential  -     Comprehensive metabolic panel  -     Lipid Panel With / Chol / HDL Ratio  -     PSA Screen    2. Screening for malignant neoplasm  of prostate  -     PSA Screen    3. Abnormal findings on diagnostic imaging of other parts of digestive tract   -     Lipid Panel With / Chol / HDL Ratio    4. Screen for colon cancer  -     Cologuard - Stool, Per Rectum    5. Tobacco use        Follow Up:   No follow-ups on file.     An After Visit Summary and PPPS were made available to the patient.          I spent 5  minutes caring for Crys on this date of service. This time includes time spent by me in the following activities:preparing for the visit, reviewing tests, counseling and educating the patient/family/caregiver, ordering medications, tests, or procedures, independently interpreting results and communicating that information with the patient/family/caregiver and care coordination

## 2022-08-15 NOTE — PATIENT INSTRUCTIONS
Steps to Quit Smoking  Smoking tobacco is the leading cause of preventable death. It can affect almost every organ in the body. Smoking puts you and those around you at risk for developing many serious chronic diseases. Quitting smoking can be difficult, but it is one of the best things that you can do for your health. It is never too late to quit.  How do I get ready to quit?  When you decide to quit smoking, create a plan to help you succeed. Before you quit:  · Pick a date to quit. Set a date within the next 2 weeks to give you time to prepare.  · Write down the reasons why you are quitting. Keep this list in places where you will see it often.  · Tell your family, friends, and co-workers that you are quitting. Support from your loved ones can make quitting easier.  · Talk with your health care provider about your options for quitting smoking.  · Find out what treatment options are covered by your health insurance.  · Identify people, places, things, and activities that make you want to smoke (triggers). Avoid them.  What first steps can I take to quit smoking?  · Throw away all cigarettes at home, at work, and in your car.  · Throw away smoking accessories, such as ashtrays and lighters.  · Clean your car. Make sure to empty the ashtray.  · Clean your home, including curtains and carpets.  What strategies can I use to quit smoking?  Talk with your health care provider about combining strategies, such as taking medicines while you are also receiving in-person counseling. Using these two strategies together makes you more likely to succeed in quitting than if you used either strategy on its own.  · If you are pregnant or breastfeeding, talk with your health care provider about finding counseling or other support strategies to quit smoking. Do not take medicine to help you quit smoking unless your health care provider tells you to do so.  To quit smoking:  Quit right away  · Quit smoking completely, instead of  gradually reducing how much you smoke over a period of time. Research shows that stopping smoking right away is more successful than gradually quitting.  · Attend in-person counseling to help you build problem-solving skills. You are more likely to succeed in quitting if you attend counseling sessions regularly. Even short sessions of 10 minutes can be effective.  Take medicine  You may take medicines to help you quit smoking. Some medicines require a prescription and some you can purchase over-the-counter. Medicines may have nicotine in them to replace the nicotine in cigarettes. Medicines may:  · Help to stop cravings.  · Help to relieve withdrawal symptoms.  Your health care provider may recommend:  · Nicotine patches, gum, or lozenges.  · Nicotine inhalers or sprays.  · Non-nicotine medicine that is taken by mouth.  Find resources  Find resources and support systems that can help you to quit smoking and remain smoke-free after you quit. These resources are most helpful when you use them often. They include:  · Online chats with a counselor.  · Telephone quitlines.  · Printed self-help materials.  · Support groups or group counseling.  · Text messaging programs.  · Mobile phone apps or applications. Use apps that can help you stick to your quit plan by providing reminders, tips, and encouragement. There are many free apps for mobile devices as well as websites. Examples include Quit Guide from the CDC and smokefree.gov  What things can I do to make it easier to quit?    · Reach out to your family and friends for support and encouragement. Call telephone quitlines (1-931-QUIT-NOW), reach out to support groups, or work with a counselor for support.  · Ask people who smoke to avoid smoking around you.  · Avoid places that trigger you to smoke, such as bars, parties, or smoke-break areas at work.  · Spend time with people who do not smoke.  · Lessen the stress in your life. Stress can be a smoking trigger for some  people. To lessen stress, try:  ? Exercising regularly.  ? Doing deep-breathing exercises.  ? Doing yoga.  ? Meditating.  ? Performing a body scan. This involves closing your eyes, scanning your body from head to toe, and noticing which parts of your body are particularly tense. Try to relax the muscles in those areas.  How will I feel when I quit smoking?  Day 1 to 3 weeks  Within the first 24 hours of quitting smoking, you may start to feel withdrawal symptoms. These symptoms are usually most noticeable 2-3 days after quitting, but they usually do not last for more than 2-3 weeks. You may experience these symptoms:  · Mood swings.  · Restlessness, anxiety, or irritability.  · Trouble concentrating.  · Dizziness.  · Strong cravings for sugary foods and nicotine.  · Mild weight gain.  · Constipation.  · Nausea.  · Coughing or a sore throat.  · Changes in how the medicines that you take for unrelated issues work in your body.  · Depression.  · Trouble sleeping (insomnia).  Week 3 and afterward  After the first 2-3 weeks of quitting, you may start to notice more positive results, such as:  · Improved sense of smell and taste.  · Decreased coughing and sore throat.  · Slower heart rate.  · Lower blood pressure.  · Clearer skin.  · The ability to breathe more easily.  · Fewer sick days.  Quitting smoking can be very challenging. Do not get discouraged if you are not successful the first time. Some people need to make many attempts to quit before they achieve long-term success. Do your best to stick to your quit plan, and talk with your health care provider if you have any questions or concerns.  Summary  · Smoking tobacco is the leading cause of preventable death. Quitting smoking is one of the best things that you can do for your health.  · When you decide to quit smoking, create a plan to help you succeed.  · Quit smoking right away, not slowly over a period of time.  · When you start quitting, seek help from your  health care provider, family, or friends.  This information is not intended to replace advice given to you by your health care provider. Make sure you discuss any questions you have with your health care provider.  Document Revised: 09/11/2020 Document Reviewed: 03/07/2020  Elsevier Patient Education © 2021 [employer]

## 2022-08-16 LAB
ALBUMIN SERPL-MCNC: 4.8 G/DL (ref 3.5–5.2)
ALBUMIN/GLOB SERPL: 2.5 G/DL
ALP SERPL-CCNC: 79 U/L (ref 39–117)
ALT SERPL-CCNC: 19 U/L (ref 1–41)
AST SERPL-CCNC: 22 U/L (ref 1–40)
BASOPHILS # BLD AUTO: 0.02 10*3/MM3 (ref 0–0.2)
BASOPHILS NFR BLD AUTO: 0.3 % (ref 0–1.5)
BILIRUB SERPL-MCNC: 0.3 MG/DL (ref 0–1.2)
BUN SERPL-MCNC: 13 MG/DL (ref 8–23)
BUN/CREAT SERPL: 10 (ref 7–25)
CALCIUM SERPL-MCNC: 10 MG/DL (ref 8.6–10.5)
CHLORIDE SERPL-SCNC: 100 MMOL/L (ref 98–107)
CHOLEST SERPL-MCNC: 208 MG/DL (ref 0–200)
CHOLEST/HDLC SERPL: 4.24 {RATIO}
CO2 SERPL-SCNC: 26.8 MMOL/L (ref 22–29)
CREAT SERPL-MCNC: 1.3 MG/DL (ref 0.76–1.27)
EGFRCR-CYS SERPLBLD CKD-EPI 2021: 57.6 ML/MIN/1.73
EOSINOPHIL # BLD AUTO: 0.17 10*3/MM3 (ref 0–0.4)
EOSINOPHIL NFR BLD AUTO: 2.2 % (ref 0.3–6.2)
ERYTHROCYTE [DISTWIDTH] IN BLOOD BY AUTOMATED COUNT: 12.3 % (ref 12.3–15.4)
GLOBULIN SER CALC-MCNC: 1.9 GM/DL
GLUCOSE SERPL-MCNC: 87 MG/DL (ref 65–99)
HCT VFR BLD AUTO: 42.2 % (ref 37.5–51)
HDLC SERPL-MCNC: 49 MG/DL (ref 40–60)
HGB BLD-MCNC: 14.1 G/DL (ref 13–17.7)
IMM GRANULOCYTES # BLD AUTO: 0.03 10*3/MM3 (ref 0–0.05)
IMM GRANULOCYTES NFR BLD AUTO: 0.4 % (ref 0–0.5)
LDLC SERPL CALC-MCNC: 127 MG/DL (ref 0–100)
LYMPHOCYTES # BLD AUTO: 2.19 10*3/MM3 (ref 0.7–3.1)
LYMPHOCYTES NFR BLD AUTO: 28.4 % (ref 19.6–45.3)
MCH RBC QN AUTO: 33.1 PG (ref 26.6–33)
MCHC RBC AUTO-ENTMCNC: 33.4 G/DL (ref 31.5–35.7)
MCV RBC AUTO: 99.1 FL (ref 79–97)
MONOCYTES # BLD AUTO: 0.9 10*3/MM3 (ref 0.1–0.9)
MONOCYTES NFR BLD AUTO: 11.7 % (ref 5–12)
NEUTROPHILS # BLD AUTO: 4.41 10*3/MM3 (ref 1.7–7)
NEUTROPHILS NFR BLD AUTO: 57 % (ref 42.7–76)
NRBC BLD AUTO-RTO: 0 /100 WBC (ref 0–0.2)
PLATELET # BLD AUTO: 275 10*3/MM3 (ref 140–450)
POTASSIUM SERPL-SCNC: 5.2 MMOL/L (ref 3.5–5.2)
PROT SERPL-MCNC: 6.7 G/DL (ref 6–8.5)
PSA SERPL-MCNC: 2.96 NG/ML (ref 0–4)
RBC # BLD AUTO: 4.26 10*6/MM3 (ref 4.14–5.8)
SODIUM SERPL-SCNC: 137 MMOL/L (ref 136–145)
TRIGL SERPL-MCNC: 180 MG/DL (ref 0–150)
VLDLC SERPL CALC-MCNC: 32 MG/DL (ref 5–40)
WBC # BLD AUTO: 7.72 10*3/MM3 (ref 3.4–10.8)

## 2022-08-29 ENCOUNTER — TELEPHONE (OUTPATIENT)
Dept: FAMILY MEDICINE CLINIC | Facility: CLINIC | Age: 77
End: 2022-08-29

## 2022-08-29 NOTE — TELEPHONE ENCOUNTER
Caller: Crys Sky    Relationship: Self    Best call back number:[phone]    What is the best time to reach you: ANYTIME     Who are you requesting to speak with (clinical staff, provider,  specific staff member) CLINICAL STAFF      What was the call regarding: PATIENT REQUESTING A CALL BACK TO TO DISCUSS THE LETTER HE RECEIVED ASKING HIM TO CALL THE OFFICE ABOUT HIS LAB RESULTS     Do you require a callback:  YES

## 2022-08-30 ENCOUNTER — TELEPHONE (OUTPATIENT)
Dept: FAMILY MEDICINE CLINIC | Facility: CLINIC | Age: 77
End: 2022-08-30

## 2022-09-07 RX ORDER — HYDROCHLOROTHIAZIDE 25 MG/1
25 TABLET ORAL DAILY
Qty: 90 TABLET | Refills: 1 | Status: SHIPPED | OUTPATIENT
Start: 2022-09-07

## 2022-09-07 RX ORDER — LISINOPRIL 40 MG/1
40 TABLET ORAL DAILY
Qty: 90 TABLET | Refills: 1 | Status: SHIPPED | OUTPATIENT
Start: 2022-09-07 | End: 2022-12-12

## 2022-09-07 RX ORDER — ROSUVASTATIN CALCIUM 10 MG/1
10 TABLET, COATED ORAL NIGHTLY
Qty: 90 TABLET | Refills: 1 | Status: SHIPPED | OUTPATIENT
Start: 2022-09-07 | End: 2022-12-12

## 2022-09-07 RX ORDER — CLOPIDOGREL BISULFATE 75 MG/1
75 TABLET ORAL DAILY
Qty: 90 TABLET | Refills: 1 | Status: SHIPPED | OUTPATIENT
Start: 2022-09-07 | End: 2022-12-12

## 2022-09-07 RX ORDER — PAROXETINE HYDROCHLORIDE 20 MG/1
20 TABLET, FILM COATED ORAL EVERY MORNING
Qty: 90 TABLET | Refills: 1 | Status: SHIPPED | OUTPATIENT
Start: 2022-09-07

## 2022-09-12 ENCOUNTER — HOSPITAL ENCOUNTER (OUTPATIENT)
Dept: VASCULAR LAB | Age: 77
Discharge: HOME | End: 2022-09-12
Payer: MEDICARE

## 2022-09-12 DIAGNOSIS — I65.23 BILATERAL CAROTID ARTERY STENOSIS: ICD-10-CM

## 2022-09-12 DIAGNOSIS — I73.9 PERIPHERAL VASCULAR DISEASE (HCC): ICD-10-CM

## 2022-09-12 PROCEDURE — 93923 UPR/LXTR ART STDY 3+ LVLS: CPT

## 2022-09-12 PROCEDURE — 93880 EXTRACRANIAL BILAT STUDY: CPT

## 2022-09-12 PROCEDURE — 93922 UPR/L XTREMITY ART 2 LEVELS: CPT

## 2022-12-12 RX ORDER — CLOPIDOGREL BISULFATE 75 MG/1
TABLET ORAL
Qty: 90 TABLET | Refills: 1 | Status: SHIPPED | OUTPATIENT
Start: 2022-12-12

## 2022-12-12 RX ORDER — ROSUVASTATIN CALCIUM 10 MG/1
TABLET, COATED ORAL
Qty: 90 TABLET | Refills: 1 | Status: SHIPPED | OUTPATIENT
Start: 2022-12-12

## 2022-12-12 RX ORDER — LISINOPRIL 40 MG/1
TABLET ORAL
Qty: 90 TABLET | Refills: 1 | Status: SHIPPED | OUTPATIENT
Start: 2022-12-12

## 2022-12-15 ENCOUNTER — OFFICE VISIT (OUTPATIENT)
Dept: FAMILY MEDICINE CLINIC | Facility: CLINIC | Age: 77
End: 2022-12-15

## 2022-12-15 VITALS
WEIGHT: 152 LBS | OXYGEN SATURATION: 95 % | DIASTOLIC BLOOD PRESSURE: 80 MMHG | HEART RATE: 103 BPM | HEIGHT: 67 IN | BODY MASS INDEX: 23.86 KG/M2 | SYSTOLIC BLOOD PRESSURE: 148 MMHG

## 2022-12-15 DIAGNOSIS — I10 PRIMARY HYPERTENSION: Primary | ICD-10-CM

## 2022-12-15 DIAGNOSIS — E78.2 MIXED HYPERLIPIDEMIA: ICD-10-CM

## 2022-12-15 PROCEDURE — 99213 OFFICE O/P EST LOW 20 MIN: CPT | Performed by: FAMILY MEDICINE

## 2022-12-15 PROCEDURE — 1170F FXNL STATUS ASSESSED: CPT | Performed by: FAMILY MEDICINE

## 2022-12-15 PROCEDURE — 1160F RVW MEDS BY RX/DR IN RCRD: CPT | Performed by: FAMILY MEDICINE

## 2022-12-15 NOTE — PROGRESS NOTES
"Subjective   Crys Sky is a 77 y.o. male.     Chief Complaint   Patient presents with   • Medicare Wellness-subsequent       History of Present Illness     oveall doing well      Current Outpatient Medications:   •  aspirin 81 MG EC tablet, Take 81 mg by mouth., Disp: , Rfl:   •  clopidogrel (PLAVIX) 75 MG tablet, TAKE ONE TABLET DAILY, Disp: 90 tablet, Rfl: 1  •  hydroCHLOROthiazide (HYDRODIURIL) 25 MG tablet, Take 1 tablet by mouth Daily., Disp: 90 tablet, Rfl: 1  •  lisinopril (PRINIVIL,ZESTRIL) 40 MG tablet, TAKE ONE TABLET DAILY., Disp: 90 tablet, Rfl: 1  •  multivitamin with minerals tablet tablet, Take 1 tablet by mouth., Disp: , Rfl:   •  PARoxetine (PAXIL) 20 MG tablet, Take 1 tablet by mouth Every Morning., Disp: 90 tablet, Rfl: 1  •  rosuvastatin (CRESTOR) 10 MG tablet, TAKE ONE TABLET EVERY NIGHT, Disp: 90 tablet, Rfl: 1  Allergies   Allergen Reactions   • Sulfa Antibiotics Rash     Childhood allergy   • Beef-Derived Products Hives   • Pork-Derived Products Hives       BMI is within normal parameters. No other follow-up for BMI required.      No past medical history on file.  No past surgical history on file.    Review of Systems   Constitutional: Negative.    HENT: Negative.    Eyes: Negative.    Respiratory: Negative.    Cardiovascular: Negative.    Gastrointestinal: Negative.    Endocrine: Negative.    Genitourinary: Negative.    Musculoskeletal: Negative.    Skin: Negative.    Allergic/Immunologic: Negative.    Neurological: Negative.    Hematological: Negative.    Psychiatric/Behavioral: Negative.        Objective  /80   Pulse 103   Ht 170.2 cm (67\")   Wt 68.9 kg (152 lb)   SpO2 95%   BMI 23.81 kg/m²   Physical Exam  Vitals and nursing note reviewed.   Constitutional:       Appearance: Normal appearance. He is normal weight.   HENT:      Head: Normocephalic and atraumatic.      Nose: Nose normal.      Mouth/Throat:      Mouth: Mucous membranes are dry.      Pharynx: Oropharynx is " clear.   Eyes:      Extraocular Movements: Extraocular movements intact.      Conjunctiva/sclera: Conjunctivae normal.      Pupils: Pupils are equal, round, and reactive to light.   Cardiovascular:      Rate and Rhythm: Normal rate and regular rhythm.      Pulses: Normal pulses.      Heart sounds: Normal heart sounds.   Pulmonary:      Effort: Pulmonary effort is normal.      Breath sounds: Normal breath sounds.   Abdominal:      General: Abdomen is flat. Bowel sounds are normal.      Palpations: Abdomen is soft.   Musculoskeletal:         General: Normal range of motion.      Cervical back: Normal range of motion and neck supple.   Skin:     General: Skin is warm and dry.      Capillary Refill: Capillary refill takes less than 2 seconds.   Neurological:      General: No focal deficit present.      Mental Status: He is alert and oriented to person, place, and time. Mental status is at baseline.   Psychiatric:         Mood and Affect: Mood normal.         Assessment & Plan   Diagnoses and all orders for this visit:    1. Primary hypertension (Primary)  -     Comprehensive metabolic panel  -     Lipid Panel With / Chol / HDL Ratio    2. Mixed hyperlipidemia  -     Comprehensive metabolic panel  -     Lipid Panel With / Chol / HDL Ratio                 Orders Placed This Encounter   Procedures   • Comprehensive metabolic panel     Order Specific Question:   Release to patient     Answer:   Routine Release   • Lipid Panel With / Chol / HDL Ratio     Order Specific Question:   Release to patient     Answer:   Routine Release       Follow up: 6 month(s)

## 2022-12-15 NOTE — PROGRESS NOTES
The ABCs of the Annual Wellness Visit  Subsequent Medicare Wellness Visit    Subjective      Crys Sky is a 77 y.o. male who presents for a Subsequent Medicare Wellness Visit.    The following portions of the patient's history were reviewed and   updated as appropriate: allergies, current medications, past family history, past medical history, past social history, past surgical history and problem list.    Compared to one year ago, the patient feels his physical   health is the same.    Compared to one year ago, the patient feels his mental   health is the same.    Recent Hospitalizations:  He was not admitted to the hospital during the last year.       Current Medical Providers:  Patient Care Team:  Steven Silverio MD as PCP - General (Family Medicine)    Outpatient Medications Prior to Visit   Medication Sig Dispense Refill   • aspirin 81 MG EC tablet Take 81 mg by mouth.     • clopidogrel (PLAVIX) 75 MG tablet TAKE ONE TABLET DAILY 90 tablet 1   • hydroCHLOROthiazide (HYDRODIURIL) 25 MG tablet Take 1 tablet by mouth Daily. 90 tablet 1   • lisinopril (PRINIVIL,ZESTRIL) 40 MG tablet TAKE ONE TABLET DAILY. 90 tablet 1   • multivitamin with minerals tablet tablet Take 1 tablet by mouth.     • PARoxetine (PAXIL) 20 MG tablet Take 1 tablet by mouth Every Morning. 90 tablet 1   • rosuvastatin (CRESTOR) 10 MG tablet TAKE ONE TABLET EVERY NIGHT 90 tablet 1     No facility-administered medications prior to visit.       No opioid medication identified on active medication list. I have reviewed chart for other potential  high risk medication/s and harmful drug interactions in the elderly.          Aspirin is on active medication list. Aspirin use is indicated based on review of current medical condition/s. Pros and cons of this therapy have been discussed today. Benefits of this medication outweigh potential harm.  Patient has been encouraged to continue taking this medication.  .      There is no problem list on  "file for this patient.    Advance Care Planning  Advance Directive is not on file.  ACP discussion was held with the patient during this visit. Patient does not have an advance directive, information provided.     Objective    Vitals:    12/15/22 1319   BP: 148/80   Pulse: 103   SpO2: 95%   Weight: 68.9 kg (152 lb)   Height: 170.2 cm (67\")     Estimated body mass index is 23.81 kg/m² as calculated from the following:    Height as of this encounter: 170.2 cm (67\").    Weight as of this encounter: 68.9 kg (152 lb).    BMI is within normal parameters. No other follow-up for BMI required.      Does the patient have evidence of cognitive impairment?   No            HEALTH RISK ASSESSMENT    Smoking Status:  Social History     Tobacco Use   Smoking Status Every Day   • Packs/day: 3.00   • Years: 50.00   • Pack years: 150.00   • Types: Cigars, Cigarettes   Smokeless Tobacco Never     Alcohol Consumption:  Social History     Substance and Sexual Activity   Alcohol Use Not on file     Fall Risk Screen:    STEADI Fall Risk Assessment was completed, and patient is at MODERATE risk for falls. Assessment completed on:12/15/2022    Depression Screening:  PHQ-2/PHQ-9 Depression Screening 12/15/2022   Retired PHQ-9 Total Score -   Retired Total Score -   Little Interest or Pleasure in Doing Things 0-->not at all   Feeling Down, Depressed or Hopeless 0-->not at all   PHQ-9: Brief Depression Severity Measure Score 0       Health Habits and Functional and Cognitive Screening:  Functional & Cognitive Status 12/15/2022   Do you have difficulty preparing food and eating? No   Do you have difficulty bathing yourself, getting dressed or grooming yourself? No   Do you have difficulty using the toilet? No   Do you have difficulty moving around from place to place? No   Do you have trouble with steps or getting out of a bed or a chair? No   Current Diet Well Balanced Diet   Dental Exam Up to date   Eye Exam Up to date   Exercise (times per " week) 3 times per week   Current Exercises Include Walking   Do you need help using the phone?  No   Are you deaf or do you have serious difficulty hearing?  No   Do you need help with transportation? No   Do you need help shopping? No   Do you need help preparing meals?  No   Do you need help with housework?  No   Do you need help with laundry? No   Do you need help taking your medications? No   Do you need help managing money? No   Do you ever drive or ride in a car without wearing a seat belt? No   Have you felt unusual stress, anger or loneliness in the last month? No   Who do you live with? Alone   If you need help, do you have trouble finding someone available to you? No   Have you been bothered in the last four weeks by sexual problems? No   Do you have difficulty concentrating, remembering or making decisions? No       Age-appropriate Screening Schedule:  Refer to the list below for future screening recommendations based on patient's age, sex and/or medical conditions. Orders for these recommended tests are listed in the plan section. The patient has been provided with a written plan.    Health Maintenance   Topic Date Due   • TDAP/TD VACCINES (1 - Tdap) Never done   • ZOSTER VACCINE (1 of 2) Never done   • LIPID PANEL  08/15/2023   • INFLUENZA VACCINE  Completed                CMS Preventative Services Quick Reference  Risk Factors Identified During Encounter:    Chronic Pain: Home exercise plan outlined.    The above risks/problems have been discussed with the patient.  Pertinent information has been shared with the patient in the After Visit Summary.    Diagnoses and all orders for this visit:    1. Primary hypertension (Primary)  -     Comprehensive metabolic panel  -     Lipid Panel With / Chol / HDL Ratio    2. Mixed hyperlipidemia  -     Comprehensive metabolic panel  -     Lipid Panel With / Chol / HDL Ratio        Follow Up:   Next Medicare Wellness visit to be scheduled in 1 year.      An After  Visit Summary and PPPS were made available to the patient.

## 2022-12-16 LAB
ALBUMIN SERPL-MCNC: 5.1 G/DL (ref 3.5–5.2)
ALBUMIN/GLOB SERPL: 2.1 G/DL
ALP SERPL-CCNC: 98 U/L (ref 39–117)
ALT SERPL-CCNC: 16 U/L (ref 1–41)
AST SERPL-CCNC: 23 U/L (ref 1–40)
BILIRUB SERPL-MCNC: 0.2 MG/DL (ref 0–1.2)
BUN SERPL-MCNC: 20 MG/DL (ref 8–23)
BUN/CREAT SERPL: 16.5 (ref 7–25)
CALCIUM SERPL-MCNC: 10 MG/DL (ref 8.6–10.5)
CHLORIDE SERPL-SCNC: 96 MMOL/L (ref 98–107)
CHOLEST SERPL-MCNC: 214 MG/DL (ref 0–200)
CHOLEST/HDLC SERPL: 4.65 {RATIO}
CO2 SERPL-SCNC: 32.1 MMOL/L (ref 22–29)
CREAT SERPL-MCNC: 1.21 MG/DL (ref 0.76–1.27)
EGFRCR SERPLBLD CKD-EPI 2021: 62.4 ML/MIN/1.73
GLOBULIN SER CALC-MCNC: 2.4 GM/DL
GLUCOSE SERPL-MCNC: 77 MG/DL (ref 65–99)
HDLC SERPL-MCNC: 46 MG/DL (ref 40–60)
LDLC SERPL CALC-MCNC: 136 MG/DL (ref 0–100)
POTASSIUM SERPL-SCNC: 4.9 MMOL/L (ref 3.5–5.2)
PROT SERPL-MCNC: 7.5 G/DL (ref 6–8.5)
SODIUM SERPL-SCNC: 136 MMOL/L (ref 136–145)
TRIGL SERPL-MCNC: 176 MG/DL (ref 0–150)
VLDLC SERPL CALC-MCNC: 32 MG/DL (ref 5–40)

## 2022-12-27 ENCOUNTER — DOCUMENTATION (OUTPATIENT)
Dept: FAMILY MEDICINE CLINIC | Facility: CLINIC | Age: 77
End: 2022-12-27

## 2023-01-03 ENCOUNTER — TELEPHONE (OUTPATIENT)
Dept: FAMILY MEDICINE CLINIC | Facility: CLINIC | Age: 78
End: 2023-01-03
Payer: MEDICARE

## 2023-01-03 RX ORDER — PRAVASTATIN SODIUM 40 MG
40 TABLET ORAL NIGHTLY
Qty: 30 TABLET | Refills: 5 | Status: SHIPPED | OUTPATIENT
Start: 2023-01-03 | End: 2023-03-09

## 2023-01-03 NOTE — TELEPHONE ENCOUNTER
When I called pt regarding his labs he stated that when he takes the crestor and plavix together it makes him feel real weird so he stopped them both,i told him he really needs to take his plavix but you may change the chol meds? [phone]

## 2023-03-09 RX ORDER — PRAVASTATIN SODIUM 40 MG
TABLET ORAL
Qty: 90 TABLET | Refills: 1 | Status: SHIPPED | OUTPATIENT
Start: 2023-03-09

## 2023-06-15 ENCOUNTER — OFFICE VISIT (OUTPATIENT)
Dept: FAMILY MEDICINE CLINIC | Facility: CLINIC | Age: 78
End: 2023-06-15
Payer: MEDICARE

## 2023-06-15 VITALS
TEMPERATURE: 97.3 F | BODY MASS INDEX: 25.74 KG/M2 | HEART RATE: 100 BPM | HEIGHT: 67 IN | OXYGEN SATURATION: 95 % | WEIGHT: 164 LBS | SYSTOLIC BLOOD PRESSURE: 202 MMHG | DIASTOLIC BLOOD PRESSURE: 110 MMHG

## 2023-06-15 DIAGNOSIS — E78.2 MIXED HYPERLIPIDEMIA: ICD-10-CM

## 2023-06-15 DIAGNOSIS — I77.9 CAROTID ARTERY DISEASE, UNSPECIFIED LATERALITY, UNSPECIFIED TYPE: ICD-10-CM

## 2023-06-15 DIAGNOSIS — I10 PRIMARY HYPERTENSION: Primary | ICD-10-CM

## 2023-06-15 PROCEDURE — 99213 OFFICE O/P EST LOW 20 MIN: CPT | Performed by: FAMILY MEDICINE

## 2023-06-15 RX ORDER — HYDROCHLOROTHIAZIDE 25 MG/1
25 TABLET ORAL DAILY
Qty: 90 TABLET | Refills: 1 | Status: SHIPPED | OUTPATIENT
Start: 2023-06-15

## 2023-06-15 RX ORDER — ASPIRIN 81 MG/1
81 TABLET ORAL DAILY
Qty: 90 TABLET | Refills: 4 | Status: SHIPPED | OUTPATIENT
Start: 2023-06-15

## 2023-06-15 RX ORDER — LISINOPRIL 40 MG/1
40 TABLET ORAL DAILY
Qty: 90 TABLET | Refills: 1 | Status: SHIPPED | OUTPATIENT
Start: 2023-06-15

## 2023-06-15 RX ORDER — PAROXETINE HYDROCHLORIDE 20 MG/1
20 TABLET, FILM COATED ORAL EVERY MORNING
Qty: 90 TABLET | Refills: 1 | Status: SHIPPED | OUTPATIENT
Start: 2023-06-15

## 2023-06-15 RX ORDER — CLOPIDOGREL BISULFATE 75 MG/1
75 TABLET ORAL DAILY
Qty: 90 TABLET | Refills: 1 | Status: SHIPPED | OUTPATIENT
Start: 2023-06-15

## 2023-06-15 RX ORDER — PRAVASTATIN SODIUM 40 MG
40 TABLET ORAL NIGHTLY
Qty: 90 TABLET | Refills: 1 | Status: SHIPPED | OUTPATIENT
Start: 2023-06-15

## 2023-06-15 NOTE — PROGRESS NOTES
"Subjective   Crys Sky is a 77 y.o. male.     Chief Complaint   Patient presents with    Hypertension    Hyperlipidemia        History of Present Illness     Hehas been out of his  bp cmeds--denies any angina sytmopsom--toleina statin wiutout myalgis--christal shirley help his depresion symptoms      Current Outpatient Medications:     aspirin 81 MG EC tablet, Take 1 tablet by mouth., Disp: , Rfl:     clopidogrel (PLAVIX) 75 MG tablet, Take 1 tablet by mouth Daily., Disp: 90 tablet, Rfl: 1    hydroCHLOROthiazide (HYDRODIURIL) 25 MG tablet, Take 1 tablet by mouth Daily., Disp: 90 tablet, Rfl: 1    lisinopril (PRINIVIL,ZESTRIL) 40 MG tablet, Take 1 tablet by mouth Daily., Disp: 90 tablet, Rfl: 1    multivitamin with minerals tablet tablet, Take 1 tablet by mouth., Disp: , Rfl:     PARoxetine (PAXIL) 20 MG tablet, Take 1 tablet by mouth Every Morning., Disp: 90 tablet, Rfl: 1    pravastatin (PRAVACHOL) 40 MG tablet, Take 1 tablet by mouth Every Night., Disp: 90 tablet, Rfl: 1    aspirin 81 MG EC tablet, Take 1 tablet by mouth Daily., Disp: 90 tablet, Rfl: 4  Allergies   Allergen Reactions    Sulfa Antibiotics Rash     Childhood allergy    Beef-Derived Products Hives    Pork-Derived Products Hives       BMI is >= 25 and <30. (Overweight) The following options were offered after discussion;: nutrition counseling/recommendations      History reviewed. No pertinent past medical history.  History reviewed. No pertinent surgical history.    Review of Systems   Constitutional: Negative.    HENT: Negative.     Eyes: Negative.    Respiratory: Negative.     Cardiovascular: Negative.    Gastrointestinal: Negative.    Endocrine: Negative.    Genitourinary: Negative.    Musculoskeletal: Negative.    Skin: Negative.    Allergic/Immunologic: Negative.    Neurological: Negative.    Hematological: Negative.    Psychiatric/Behavioral: Negative.       Objective BP (!) 202/110   Pulse 100   Temp 97.3 °F (36.3 °C)   Ht 170.2 cm (67\") "   Wt 74.4 kg (164 lb)   SpO2 95%   BMI 25.69 kg/m²    Physical Exam  Vitals and nursing note reviewed.   Constitutional:       Appearance: Normal appearance. He is normal weight.   HENT:      Head: Normocephalic and atraumatic.      Nose: Nose normal.      Mouth/Throat:      Mouth: Mucous membranes are moist.   Eyes:      Pupils: Pupils are equal, round, and reactive to light.   Cardiovascular:      Rate and Rhythm: Normal rate and regular rhythm.      Pulses: Normal pulses.      Heart sounds: Normal heart sounds.   Pulmonary:      Effort: Pulmonary effort is normal.      Breath sounds: Normal breath sounds.   Abdominal:      General: Abdomen is flat. Bowel sounds are normal.      Palpations: Abdomen is soft.   Musculoskeletal:         General: Normal range of motion.      Cervical back: Normal range of motion and neck supple.   Skin:     General: Skin is warm.      Capillary Refill: Capillary refill takes less than 2 seconds.   Neurological:      General: No focal deficit present.      Mental Status: He is alert.   Psychiatric:         Mood and Affect: Mood normal.       Assessment & Plan   Diagnoses and all orders for this visit:    1. Primary hypertension (Primary)    2. Mixed hyperlipidemia    3. Carotid artery disease, unspecified laterality, unspecified type    Other orders  -     lisinopril (PRINIVIL,ZESTRIL) 40 MG tablet; Take 1 tablet by mouth Daily.  Dispense: 90 tablet; Refill: 1  -     pravastatin (PRAVACHOL) 40 MG tablet; Take 1 tablet by mouth Every Night.  Dispense: 90 tablet; Refill: 1  -     PARoxetine (PAXIL) 20 MG tablet; Take 1 tablet by mouth Every Morning.  Dispense: 90 tablet; Refill: 1  -     hydroCHLOROthiazide (HYDRODIURIL) 25 MG tablet; Take 1 tablet by mouth Daily.  Dispense: 90 tablet; Refill: 1  -     clopidogrel (PLAVIX) 75 MG tablet; Take 1 tablet by mouth Daily.  Dispense: 90 tablet; Refill: 1  -     aspirin 81 MG EC tablet; Take 1 tablet by mouth Daily.  Dispense: 90 tablet;  Refill: 4      Heiwl monitor bp and keep me infoje           No orders of the defined types were placed in this encounter.      Follow up: 2 month(s) with lbas

## 2023-08-17 ENCOUNTER — OFFICE VISIT (OUTPATIENT)
Dept: FAMILY MEDICINE CLINIC | Facility: CLINIC | Age: 78
End: 2023-08-17
Payer: MEDICARE

## 2023-08-17 VITALS
HEIGHT: 67 IN | DIASTOLIC BLOOD PRESSURE: 82 MMHG | BODY MASS INDEX: 24.96 KG/M2 | SYSTOLIC BLOOD PRESSURE: 140 MMHG | WEIGHT: 159 LBS | RESPIRATION RATE: 18 BRPM | OXYGEN SATURATION: 99 % | HEART RATE: 96 BPM | TEMPERATURE: 98.3 F

## 2023-08-17 DIAGNOSIS — I10 PRIMARY HYPERTENSION: Primary | ICD-10-CM

## 2023-08-17 DIAGNOSIS — R21 RASH: ICD-10-CM

## 2023-08-17 PROCEDURE — 3079F DIAST BP 80-89 MM HG: CPT | Performed by: FAMILY MEDICINE

## 2023-08-17 PROCEDURE — 99212 OFFICE O/P EST SF 10 MIN: CPT | Performed by: FAMILY MEDICINE

## 2023-08-17 PROCEDURE — 3077F SYST BP >= 140 MM HG: CPT | Performed by: FAMILY MEDICINE

## 2023-08-17 NOTE — PROGRESS NOTES
"Subjective   Crys Sky is a 77 y.o. male.     Chief Complaint   Patient presents with    Hypertension        Hypertension       He notes good bp control without cp orha--he has a chronic skin condiiton on his arms and hands for which he says he went to 5 dermatologists over time and nevr received a dx      Current Outpatient Medications:     aspirin 81 MG EC tablet, Take 1 tablet by mouth., Disp: , Rfl:     hydroCHLOROthiazide (HYDRODIURIL) 25 MG tablet, Take 1 tablet by mouth Daily., Disp: 90 tablet, Rfl: 1    lisinopril (PRINIVIL,ZESTRIL) 40 MG tablet, Take 1 tablet by mouth Daily., Disp: 90 tablet, Rfl: 1    multivitamin with minerals tablet tablet, Take 1 tablet by mouth., Disp: , Rfl:     PARoxetine (PAXIL) 20 MG tablet, Take 1 tablet by mouth Every Morning., Disp: 90 tablet, Rfl: 1  Allergies   Allergen Reactions    Sulfa Antibiotics Rash     Childhood allergy    Beef-Derived Products Hives    Pork-Derived Products Hives       BMI is within normal parameters. No other follow-up for BMI required.      No past medical history on file.  No past surgical history on file.    Review of Systems   Constitutional: Negative.    HENT: Negative.     Eyes: Negative.    Respiratory: Negative.     Cardiovascular: Negative.    Gastrointestinal: Negative.    Endocrine: Negative.    Genitourinary: Negative.    Musculoskeletal: Negative.    Skin: Negative.    Allergic/Immunologic: Negative.    Neurological: Negative.    Hematological: Negative.    Psychiatric/Behavioral: Negative.       Objective /82   Pulse 96   Temp 98.3 øF (36.8 øC)   Resp 18   Ht 170.2 cm (67.01\")   Wt 72.1 kg (159 lb)   SpO2 99%   BMI 24.90 kg/mý    Physical Exam  Vitals and nursing note reviewed.   Constitutional:       Appearance: Normal appearance. He is normal weight.   HENT:      Head: Normocephalic and atraumatic.      Nose: Nose normal.      Mouth/Throat:      Mouth: Mucous membranes are moist.   Eyes:      Pupils: Pupils are equal, " round, and reactive to light.   Cardiovascular:      Rate and Rhythm: Normal rate and regular rhythm.      Pulses: Normal pulses.      Heart sounds: Normal heart sounds.   Pulmonary:      Effort: Pulmonary effort is normal.   Abdominal:      General: Abdomen is flat. Bowel sounds are normal.      Palpations: Abdomen is soft.   Musculoskeletal:         General: Normal range of motion.      Cervical back: Normal range of motion and neck supple.   Skin:     Capillary Refill: Capillary refill takes less than 2 seconds.      Findings: Rash present.   Neurological:      Mental Status: He is alert.   Psychiatric:         Mood and Affect: Mood normal.       Assessment & Plan   Diagnoses and all orders for this visit:    1. Primary hypertension (Primary)    2. Rash      He is going to [facility] for this             No orders of the defined types were placed in this encounter.      Follow up: 3 month(s)

## 2023-10-17 ENCOUNTER — TELEPHONE (OUTPATIENT)
Dept: FAMILY MEDICINE CLINIC | Facility: CLINIC | Age: 78
End: 2023-10-17

## 2023-10-17 NOTE — TELEPHONE ENCOUNTER
Caller: SAILAJA    Relationship: Caregiver (non-relative)    Best call back number: [phone]     What is the best time to reach you: ANY    Who are you requesting to speak with (clinical staff, provider,  specific staff member): CLINICAL    Do you know the name of the person who called: [facility]    What was the call regarding: WILL BE GOING OUT THIS THUR 10.19.23 TO ESTABLISH HOME HEALTHCARE.   CAN CALL BACK IF HAVE QUESTIONS.

## 2023-10-17 NOTE — TELEPHONE ENCOUNTER
Caller: MARILOU CARLTON    Relationship: Emergency Contact    Best call back number: [phone]     What is the best time to reach you: ANY    Who are you requesting to speak with (clinical staff, provider,  specific staff member): CLINICAL     What was the call regarding:     PATIENT'S EX WIFE STATES PATIENT WAS ADMITTED TO [facility] FROM 10.10.23-10.14.23. PATIENT'S EX WIFE IS WONDERING IF PATIENT'S PROVIDER HAS THE RECORDS FROM HIS HOSPITAL VISIT?    IF NOT, PLEASE CONTACT [phone] TO REQUEST THESE RECORDS.     Is it okay if the provider responds through [employer]: NO

## 2023-10-18 ENCOUNTER — OFFICE VISIT (OUTPATIENT)
Dept: FAMILY MEDICINE CLINIC | Facility: CLINIC | Age: 78
End: 2023-10-18
Payer: MEDICARE

## 2023-10-18 ENCOUNTER — TELEPHONE (OUTPATIENT)
Dept: FAMILY MEDICINE CLINIC | Facility: CLINIC | Age: 78
End: 2023-10-18

## 2023-10-18 VITALS
RESPIRATION RATE: 16 BRPM | BODY MASS INDEX: 24.64 KG/M2 | SYSTOLIC BLOOD PRESSURE: 148 MMHG | DIASTOLIC BLOOD PRESSURE: 86 MMHG | OXYGEN SATURATION: 96 % | TEMPERATURE: 98.5 F | HEART RATE: 103 BPM | HEIGHT: 67 IN | WEIGHT: 157 LBS

## 2023-10-18 DIAGNOSIS — I63.9 CEREBROVASCULAR ACCIDENT (CVA), UNSPECIFIED MECHANISM: Primary | ICD-10-CM

## 2023-10-18 DIAGNOSIS — Z72.0 TOBACCO USE: ICD-10-CM

## 2023-10-18 RX ORDER — BUPROPION HYDROCHLORIDE 75 MG/1
75 TABLET ORAL 2 TIMES DAILY
Qty: 60 TABLET | Refills: 2 | Status: SHIPPED | OUTPATIENT
Start: 2023-10-18

## 2023-10-18 RX ORDER — ATORVASTATIN CALCIUM 80 MG/1
80 TABLET, FILM COATED ORAL NIGHTLY
COMMUNITY
Start: 2023-10-13

## 2023-10-18 RX ORDER — CLOPIDOGREL BISULFATE 75 MG
75 TABLET ORAL DAILY
COMMUNITY

## 2023-10-18 NOTE — PROGRESS NOTES
"Subjective   Crys Sky is a 77 y.o. male.     Chief Complaint   Patient presents with    Hypertension        Hypertension         He was recently dx with stroke at [facility] and sent to [facility] in [place]..and was tx with meds--he thinks his only residual is with his speech  He was dx with plavix and asa    Current Outpatient Medications:     aspirin 81 MG EC tablet, Take 1 tablet by mouth., Disp: , Rfl:     atorvastatin (LIPITOR) 80 MG tablet, Take 1 tablet by mouth Every Night., Disp: , Rfl:     hydroCHLOROthiazide (HYDRODIURIL) 25 MG tablet, Take 1 tablet by mouth Daily., Disp: 90 tablet, Rfl: 1    lisinopril (PRINIVIL,ZESTRIL) 40 MG tablet, Take 1 tablet by mouth Daily., Disp: 90 tablet, Rfl: 1    multivitamin with minerals tablet tablet, Take 1 tablet by mouth., Disp: , Rfl:     PARoxetine (PAXIL) 20 MG tablet, Take 1 tablet by mouth Every Morning., Disp: 90 tablet, Rfl: 1    Plavix 75 MG tablet, Take 1 tablet by mouth Daily., Disp: , Rfl:     buPROPion (WELLBUTRIN) 75 MG tablet, Take 1 tablet by mouth 2 (Two) Times a Day., Disp: 60 tablet, Rfl: 2  Allergies   Allergen Reactions    Sulfa Antibiotics Rash     Childhood allergy    Beef-Derived Products Hives    Pork-Derived Products Hives       BMI is within normal parameters. No other follow-up for BMI required.      Facility age limit for growth %gabe is 20 years.    No past medical history on file.  No past surgical history on file.    Review of Systems   Constitutional: Negative.    HENT: Negative.     Eyes: Negative.    Respiratory: Negative.     Cardiovascular: Negative.    Gastrointestinal: Negative.    Endocrine: Negative.    Genitourinary: Negative.    Musculoskeletal: Negative.    Skin: Negative.    Allergic/Immunologic: Negative.    Neurological: Negative.    Hematological: Negative.    Psychiatric/Behavioral: Negative.         Objective /86   Pulse 103   Temp 98.5 °F (36.9 °C)   Resp 16   Ht 170.2 cm (67.01\")   Wt 71.2 kg (157 lb)   SpO2 " 96%   BMI 24.58 kg/m²    Physical Exam  Vitals and nursing note reviewed.   Constitutional:       Appearance: He is normal weight.   HENT:      Head: Normocephalic and atraumatic.      Nose: Nose normal.      Mouth/Throat:      Mouth: Mucous membranes are moist.   Eyes:      Extraocular Movements: Extraocular movements intact.      Conjunctiva/sclera: Conjunctivae normal.      Pupils: Pupils are equal, round, and reactive to light.   Cardiovascular:      Rate and Rhythm: Normal rate and regular rhythm.      Pulses: Normal pulses.      Heart sounds: Normal heart sounds.   Pulmonary:      Effort: Pulmonary effort is normal.      Breath sounds: Normal breath sounds.   Abdominal:      General: Abdomen is flat. Bowel sounds are normal.      Palpations: Abdomen is soft.   Musculoskeletal:         General: Normal range of motion.      Cervical back: Normal range of motion and neck supple.   Skin:     General: Skin is warm and dry.      Capillary Refill: Capillary refill takes less than 2 seconds.   Neurological:      General: No focal deficit present.      Mental Status: He is alert and oriented to person, place, and time. Mental status is at baseline.   Psychiatric:         Mood and Affect: Mood normal.         Assessment & Plan   Diagnoses and all orders for this visit:    1. Cerebrovascular accident (CVA), unspecified mechanism (Primary)  -     Ambulatory Referral to Neurology    2. Tobacco use    Other orders  -     buPROPion (WELLBUTRIN) 75 MG tablet; Take 1 tablet by mouth 2 (Two) Times a Day.  Dispense: 60 tablet; Refill: 2    He has home health     Crys Sky  reports that he has quit smoking. His smoking use included cigars. He has never used smokeless tobacco.. I have educated him on the risk of diseases from using tobacco products such as cancer, COPD, and heart disease.     I advised him to quit and he is willing to quit. We have discussed the following method/s for tobacco cessation:  Prescription  Yari.  Together we have set a quit date for  2mos .  He will follow up with me in 2 months or sooner to check on his progress.    I spent 5 minutes counseling the patient.               Orders Placed This Encounter   Procedures    Ambulatory Referral to Neurology     Referral Priority:   Routine     Referral Type:   Consultation     Referral Reason:   Specialty Services Required     Requested Specialty:   Neurology     Number of Visits Requested:   1       Follow up: 2 month(s)

## 2023-10-18 NOTE — TELEPHONE ENCOUNTER
Caller: [facility] - [place], [place] - [address] - [phone]  - [phone] FX    Relationship: Pharmacy  SPOKE WITH JIMMIE    Best call back number: [phone]     What is the best time to reach you: ANYTIME    Who are you requesting to speak with (clinical staff, provider,  specific staff member): CLINICAL    What was the call regarding: RECEIVED NEW PRESCRIPTION FOR PATIENT WELLBUTRIN 75MG BUT PATIENT ALSO TAKES PAXIL 20MG. IS PATIENT SUPPOSED TO TAKE BOTH?

## 2023-10-20 ENCOUNTER — TELEPHONE (OUTPATIENT)
Dept: FAMILY MEDICINE CLINIC | Facility: CLINIC | Age: 78
End: 2023-10-20

## 2023-10-20 NOTE — TELEPHONE ENCOUNTER
Caller:  MARIAM    Relationship:  HOME HEALTH    Best call back number:  [phone]    Who are you requesting to speak with     CLINICAL STAFF    Do you know the name of the person who called:     MARIAM    What was the call regarding:     EVALUATED PATIENT YESTERDAY 10.19.23, PATIENT ONLY WANTS NURSING FOR 1 MONTH     REFUSED PT, OT AND SPEECH THERAPY

## 2023-11-06 ENCOUNTER — TELEPHONE (OUTPATIENT)
Dept: NEUROLOGY | Age: 78
End: 2023-11-06

## 2023-11-06 NOTE — TELEPHONE ENCOUNTER
Daisy called to schedule a  new pt appt . Pt declined first available, too far out. Wants to check with referring provider. Please call pt if able to schedule sooner    Please be advised that the best time to call him to accommodate their needs is Anytime. Thank you.

## 2023-11-07 NOTE — TELEPHONE ENCOUNTER
Returned call, no answer.  I left a voicemail letting patient know we do not have a soon appointment available, I gave her the date of 1/3 as of right now this is the soonest. I advised patient to call the office back so we can schedule her, once she is scheduled she will be added to the wait list.

## 2023-11-10 ENCOUNTER — TELEPHONE (OUTPATIENT)
Dept: NEUROLOGY | Age: 78
End: 2023-11-10

## 2023-11-10 NOTE — TELEPHONE ENCOUNTER
Received a referral for this patient. Called and left patient a VM to call our office back to where we can get patient scheduled an appointment with Dr. An Mg.

## 2023-11-15 RX ORDER — CLOPIDOGREL BISULFATE 75 MG/1
75 TABLET ORAL DAILY
Qty: 90 TABLET | Refills: 1 | Status: SHIPPED | OUTPATIENT
Start: 2023-11-15

## 2023-11-15 RX ORDER — HYDROCHLOROTHIAZIDE 25 MG/1
25 TABLET ORAL DAILY
Qty: 90 TABLET | Refills: 1 | Status: SHIPPED | OUTPATIENT
Start: 2023-11-15

## 2023-11-15 RX ORDER — LISINOPRIL 40 MG/1
40 TABLET ORAL DAILY
Qty: 90 TABLET | Refills: 1 | Status: SHIPPED | OUTPATIENT
Start: 2023-11-15

## 2023-11-15 RX ORDER — PAROXETINE HYDROCHLORIDE 20 MG/1
20 TABLET, FILM COATED ORAL EVERY MORNING
Qty: 90 TABLET | Refills: 1 | Status: SHIPPED | OUTPATIENT
Start: 2023-11-15

## 2023-11-27 RX ORDER — ATORVASTATIN CALCIUM 80 MG/1
80 TABLET, FILM COATED ORAL NIGHTLY
Qty: 90 TABLET | Refills: 0 | Status: SHIPPED | OUTPATIENT
Start: 2023-11-27

## 2023-11-27 NOTE — TELEPHONE ENCOUNTER
Caller: Crys Sky    Relationship: Self    Best call back number: [phone]     Requested Prescriptions:   Requested Prescriptions     Pending Prescriptions Disp Refills    atorvastatin (LIPITOR) 80 MG tablet 90 tablet      Sig: Take 1 tablet by mouth Every Night.    ALSO REQUESTING CHANTIX FOR SMOKING CESSATION.  WELLBUTRIN DID NOT WORK FOR HIM.      Pharmacy where request should be sent:  [facility]    Last office visit with prescribing clinician: 10/18/2023   Last telemedicine visit with prescribing clinician: Visit date not found   Next office visit with prescribing clinician: Visit date not found     Additional details provided by patient:      Does the patient have less than a 3 day supply:  [x] Yes  [] No    Would you like a call back once the refill request has been completed: [] Yes [x] No    If the office needs to give you a call back, can they leave a voicemail: [] Yes [x] No    Mitch Bhatti Rep   11/27/23 15:06 CST

## 2024-02-20 RX ORDER — CLOPIDOGREL BISULFATE 75 MG/1
75 TABLET ORAL DAILY
Qty: 90 TABLET | Refills: 1 | Status: SHIPPED | OUTPATIENT
Start: 2024-02-20

## 2024-02-20 RX ORDER — LISINOPRIL 40 MG/1
40 TABLET ORAL DAILY
Qty: 90 TABLET | Refills: 1 | Status: SHIPPED | OUTPATIENT
Start: 2024-02-20

## 2024-02-20 NOTE — TELEPHONE ENCOUNTER
Caller: Crys Sky    Relationship: Self    Best call back number: [phone]    Requested Prescriptions:   Requested Prescriptions     Pending Prescriptions Disp Refills    clopidogrel (PLAVIX) 75 MG tablet 90 tablet 1     Sig: Take 1 tablet by mouth Daily.    lisinopril (PRINIVIL,ZESTRIL) 40 MG tablet 90 tablet 1     Sig: Take 1 tablet by mouth Daily.        Pharmacy where request should be sent: [facility] - [address] [phone] [facility] [phone]      Last office visit with prescribing clinician: 10/18/2023   Last telemedicine visit with prescribing clinician: Visit date not found   Next office visit with prescribing clinician: Visit date not found     Does the patient have less than a 3 day supply:  [x] Yes  [] No    Would you like a call back once the refill request has been completed: [] Yes [x] No    If the office needs to give you a call back, can they leave a voicemail: [] Yes [x] No    Mitch Wylie Rep   02/20/24 15:36 CST

## 2024-08-06 ENCOUNTER — OFFICE VISIT (OUTPATIENT)
Dept: FAMILY MEDICINE CLINIC | Facility: CLINIC | Age: 79
End: 2024-08-06
Payer: MEDICARE

## 2024-08-06 VITALS
OXYGEN SATURATION: 96 % | BODY MASS INDEX: 23.35 KG/M2 | HEIGHT: 67 IN | HEART RATE: 96 BPM | DIASTOLIC BLOOD PRESSURE: 90 MMHG | WEIGHT: 148.8 LBS | SYSTOLIC BLOOD PRESSURE: 134 MMHG | TEMPERATURE: 97.5 F

## 2024-08-06 DIAGNOSIS — E78.5 HYPERLIPIDEMIA, UNSPECIFIED HYPERLIPIDEMIA TYPE: ICD-10-CM

## 2024-08-06 DIAGNOSIS — Z86.73 HISTORY OF STROKE: ICD-10-CM

## 2024-08-06 DIAGNOSIS — F33.1 MODERATE EPISODE OF RECURRENT MAJOR DEPRESSIVE DISORDER: Chronic | ICD-10-CM

## 2024-08-06 DIAGNOSIS — I10 PRIMARY HYPERTENSION: Primary | Chronic | ICD-10-CM

## 2024-08-06 PROCEDURE — 99214 OFFICE O/P EST MOD 30 MIN: CPT | Performed by: NURSE PRACTITIONER

## 2024-08-06 PROCEDURE — 1126F AMNT PAIN NOTED NONE PRSNT: CPT | Performed by: NURSE PRACTITIONER

## 2024-08-06 PROCEDURE — 3080F DIAST BP >= 90 MM HG: CPT | Performed by: NURSE PRACTITIONER

## 2024-08-06 PROCEDURE — 3075F SYST BP GE 130 - 139MM HG: CPT | Performed by: NURSE PRACTITIONER

## 2024-08-06 RX ORDER — PAROXETINE HYDROCHLORIDE 20 MG/1
20 TABLET, FILM COATED ORAL EVERY MORNING
Qty: 90 TABLET | Refills: 1 | Status: SHIPPED | OUTPATIENT
Start: 2024-08-06

## 2024-08-06 RX ORDER — HYDROCHLOROTHIAZIDE 25 MG/1
25 TABLET ORAL DAILY
Qty: 90 TABLET | Refills: 1 | Status: SHIPPED | OUTPATIENT
Start: 2024-08-06

## 2024-08-06 RX ORDER — LISINOPRIL 40 MG/1
40 TABLET ORAL DAILY
Qty: 90 TABLET | Refills: 1 | Status: SHIPPED | OUTPATIENT
Start: 2024-08-06

## 2024-08-06 RX ORDER — ATORVASTATIN CALCIUM 80 MG/1
80 TABLET, FILM COATED ORAL NIGHTLY
Qty: 90 TABLET | Refills: 1 | Status: SHIPPED | OUTPATIENT
Start: 2024-08-06

## 2024-08-06 RX ORDER — CLOPIDOGREL BISULFATE 75 MG/1
75 TABLET ORAL DAILY
Qty: 90 TABLET | Refills: 1 | Status: SHIPPED | OUTPATIENT
Start: 2024-08-06

## 2024-08-06 NOTE — PROGRESS NOTES
"Subjective   Chief Complaint:  Regular checkup-evaluation of cholesterol blood pressure    History of Present Illness  This is a 78 y.o. male presenting today for his regular evaluation cholesterol blood pressure.  He did have a stroke within the last year-on aspirin Plavix, blood pressure to slightly elevated today-usually runs fine on current regimen he reports that he continues to have quit smoking-no longer an issue.    Past Medical, Surgical, Social, and Family History:  Allergies   Allergen Reactions    Sulfa Antibiotics Rash     Childhood allergy    Beef-Derived Products Hives    Pork-Derived Products Hives    History reviewed. No pertinent past medical history. No past surgical history on file.   Social History     Socioeconomic History    Marital status:    Tobacco Use    Smoking status: Former     Types: Cigars     Passive exposure: Past    Smokeless tobacco: Never   Vaping Use    Vaping status: Never Used   Substance and Sexual Activity    Alcohol use: Yes    Drug use: Yes     Types: Marijuana    Sexual activity: Defer    History reviewed. No pertinent family history.    Objective   Vital Signs  /90 (BP Location: Left arm, Patient Position: Sitting, Cuff Size: Large Adult)   Pulse 96   Temp 97.5 °F (36.4 °C) (Infrared)   Ht 170.2 cm (67.01\")   Wt 67.5 kg (148 lb 12.8 oz)   SpO2 96%   BMI 23.30 kg/m²    Physical Exam  Constitutional:       General: He is not in acute distress.  Neck:      Vascular: No carotid bruit.   Cardiovascular:      Rate and Rhythm: Normal rate and regular rhythm.      Pulses: Normal pulses.           Dorsalis pedis pulses are 2+ on the right side and 2+ on the left side.        Posterior tibial pulses are 2+ on the right side and 2+ on the left side.      Heart sounds: No murmur heard.     No friction rub. No gallop.   Pulmonary:      Effort: Pulmonary effort is normal. No respiratory distress.      Breath sounds: Normal breath sounds. No wheezing or rhonchi. "   Musculoskeletal:      Right lower leg: No edema.      Left lower leg: No edema.   Neurological:      Mental Status: He is alert.       Assessment & Plan   Diagnoses and all orders for this visit:    1. Primary hypertension (Primary)  Comments:  Chronic-fair control, continue HCTZ and lisinopril    2. Moderate episode of recurrent major depressive disorder  Comments:  Chronic/stable-continue paroxetine    3. History of stroke  Comments:  Continue aspirin and Plavix    4. Hyperlipidemia, unspecified hyperlipidemia type  Comments:  Chronic-due for lab-continue atorvastatin    Other orders  -     atorvastatin (LIPITOR) 80 MG tablet; Take 1 tablet by mouth Every Night.  Dispense: 90 tablet; Refill: 1  -     hydroCHLOROthiazide 25 MG tablet; Take 1 tablet by mouth Daily.  Dispense: 90 tablet; Refill: 1  -     clopidogrel (PLAVIX) 75 MG tablet; Take 1 tablet by mouth Daily.  Dispense: 90 tablet; Refill: 1  -     lisinopril (PRINIVIL,ZESTRIL) 40 MG tablet; Take 1 tablet by mouth Daily.  Dispense: 90 tablet; Refill: 1  -     PARoxetine (PAXIL) 20 MG tablet; Take 1 tablet by mouth Every Morning.  Dispense: 90 tablet; Refill: 1    Plan:  Advised and educated plan of care.  Offered lab, declined today.  Advised continue meds and we will revisit labs on the next visit.  Advised to follow-up in 6 months.    Follow-up:  The patient will Return in about 6 months (around 2/6/2025) for follow-Up.    Records and Results Reviewed:  I reviewed current medications as given by patient and allergy list    BMI is within normal parameters. No other follow-up for BMI required.    Electronically signed by EVANGELIST Coates, 08/06/24, 3:58 PM CDT.

## 2025-01-27 RX ORDER — LISINOPRIL 40 MG/1
40 TABLET ORAL DAILY
Qty: 90 TABLET | Refills: 1 | Status: SHIPPED | OUTPATIENT
Start: 2025-01-27

## 2025-01-27 RX ORDER — PAROXETINE 20 MG/1
20 TABLET, FILM COATED ORAL EVERY MORNING
Qty: 90 TABLET | Refills: 1 | Status: SHIPPED | OUTPATIENT
Start: 2025-01-27

## 2025-01-27 NOTE — TELEPHONE ENCOUNTER
Rx Refill Note  Requested Prescriptions     Pending Prescriptions Disp Refills    lisinopril (PRINIVIL,ZESTRIL) 40 MG tablet [Pharmacy Med Name: LISINOPRIL 40MG TABLET] 90 tablet 1     Sig: TAKE ONE TABLET  DAILY    PARoxetine (PAXIL) 20 MG tablet [Pharmacy Med Name: PAROXETINE HYDROCHLORIDE 20MG TABLET] 90 tablet 1     Sig: TAKE ONE TABLET  EVERY MORNING      Last office visit with prescribing clinician: 8/6/2024   Last telemedicine visit with prescribing clinician: Visit date not found   Next office visit with prescribing clinician: 2/6/2025                         Would you like a call back once the refill request has been completed: [] Yes [x] No    If the office needs to give you a call back, can they leave a voicemail: [] Yes [x] No    Shannon Farnsworth MA  01/27/25, 08:15 CST

## 2025-02-14 ENCOUNTER — OFFICE VISIT (OUTPATIENT)
Dept: FAMILY MEDICINE CLINIC | Facility: CLINIC | Age: 80
End: 2025-02-14
Payer: MEDICARE

## 2025-02-14 VITALS
WEIGHT: 145.4 LBS | SYSTOLIC BLOOD PRESSURE: 126 MMHG | OXYGEN SATURATION: 96 % | DIASTOLIC BLOOD PRESSURE: 100 MMHG | HEART RATE: 81 BPM | HEIGHT: 67 IN | TEMPERATURE: 97.1 F | BODY MASS INDEX: 22.82 KG/M2

## 2025-02-14 DIAGNOSIS — F90.0 ATTENTION DEFICIT HYPERACTIVITY DISORDER (ADHD), PREDOMINANTLY INATTENTIVE TYPE: ICD-10-CM

## 2025-02-14 DIAGNOSIS — I10 PRIMARY HYPERTENSION: ICD-10-CM

## 2025-02-14 DIAGNOSIS — G62.9 POLYNEUROPATHY: Primary | ICD-10-CM

## 2025-02-14 DIAGNOSIS — E78.5 HYPERLIPIDEMIA, UNSPECIFIED HYPERLIPIDEMIA TYPE: ICD-10-CM

## 2025-02-14 RX ORDER — GABAPENTIN 100 MG/1
100 CAPSULE ORAL 3 TIMES DAILY
Qty: 90 CAPSULE | Refills: 0 | Status: SHIPPED | OUTPATIENT
Start: 2025-02-14

## 2025-02-14 RX ORDER — DEXTROAMPHETAMINE SACCHARATE, AMPHETAMINE ASPARTATE MONOHYDRATE, DEXTROAMPHETAMINE SULFATE AND AMPHETAMINE SULFATE 2.5; 2.5; 2.5; 2.5 MG/1; MG/1; MG/1; MG/1
10 CAPSULE, EXTENDED RELEASE ORAL EVERY MORNING
Qty: 30 CAPSULE | Refills: 0 | Status: SHIPPED | OUTPATIENT
Start: 2025-02-14

## 2025-02-14 NOTE — PROGRESS NOTES
"Subjective   Chief Complaint:  Increased neuropathy symptoms    History of Present Illness  The patient is a 79-year-old male presenting for regular medication disease management.    He reports increased neuropathy symptoms and tingling in his lower extremities. He does not experience any associated pain but mentions intermittent numbness and tingling.    His blood pressure is slightly elevated in the diastolic number, but he notes that these readings are better than those he had in the past. He is on lisinopril 40 mg daily along with HCTZ 25 mg orally daily.    He has a history of high cholesterol and is currently on atorvastatin. He is due for labs.    He advises that he has taken a stimulant in the past, which improved his condition. This medication was obtained through psychiatry. However, he discontinued its use when he felt better. Currently, he reports increased issues with task completion and focus.    MEDICATIONS  Current: Lisinopril, HCTZ, atorvastatin, Adderall.    Past Medical, Surgical, Social, and Family History:  Allergies   Allergen Reactions    Sulfa Antibiotics Rash     Childhood allergy    Beef-Derived Drug Products Hives    Pork-Derived Products Hives    History reviewed. No pertinent past medical history. History reviewed. No pertinent surgical history.   Social History     Socioeconomic History    Marital status:    Tobacco Use    Smoking status: Former     Types: Cigars     Passive exposure: Past    Smokeless tobacco: Never   Vaping Use    Vaping status: Never Used   Substance and Sexual Activity    Alcohol use: Yes    Drug use: Yes     Types: Marijuana    Sexual activity: Defer    History reviewed. No pertinent family history.    Objective   Vital Signs  /100   Pulse 81   Temp 97.1 °F (36.2 °C) (Infrared)   Ht 170.2 cm (67.01\")   Wt 66 kg (145 lb 6.4 oz)   SpO2 96%   BMI 22.77 kg/m²    Physical Exam  Constitutional:       General: He is not in acute " distress.  Cardiovascular:      Rate and Rhythm: Normal rate and regular rhythm.      Pulses: Normal pulses.      Heart sounds: No murmur heard.     No friction rub. No gallop.   Pulmonary:      Effort: Pulmonary effort is normal. No respiratory distress.      Breath sounds: Normal breath sounds. No wheezing or rhonchi.   Neurological:      Mental Status: He is alert.       Assessment & Plan   Assessment & Plan  1. Polyneuropathy.  A discussion was held regarding the potential benefits of transitioning to duloxetine to manage both depression symptoms and neuropathy. However, the decision was made to initiate gabapentin therapy instead.    2. Primary hypertension, chronic, fair control.  He will maintain his current regimen of HCTZ and lisinopril. A series of tests including CBC, CMP, TSH, and lipids will be conducted today.    3. Hyperlipidemia, unspecified.  He will continue his atorvastatin therapy. A lipid panel will be performed today.    4. ADHD, predominantly inattentive.  He will be prescribed Adderall 10 mg daily. A strict follow-up appointment has been scheduled for 1 month from now, during which regulatory paperwork will be discussed.    Follow-up  The patient will follow up in 1 month.    Follow-up:  The patient will Return for 1 month reevaluation of neuropathy/adderall trial.    Records and Results Reviewed:  I reviewed current medications as given by patient and allergy list.    : [employer]- and Dragon Speech Recognition - No recording technology was used in the exam room during encounter.    Electronically signed by EVANGELIST Coates, 02/14/25, 4:52 PM CST.

## 2025-02-15 LAB
ALBUMIN SERPL-MCNC: 4.7 G/DL (ref 3.5–5.2)
ALBUMIN/GLOB SERPL: 2 G/DL
ALP SERPL-CCNC: 105 U/L (ref 39–117)
ALT SERPL-CCNC: 19 U/L (ref 1–41)
AST SERPL-CCNC: 24 U/L (ref 1–40)
BASOPHILS # BLD AUTO: 0.03 10*3/MM3 (ref 0–0.2)
BASOPHILS NFR BLD AUTO: 0.3 % (ref 0–1.5)
BILIRUB SERPL-MCNC: 0.3 MG/DL (ref 0–1.2)
BUN SERPL-MCNC: 13 MG/DL (ref 8–23)
BUN/CREAT SERPL: 12.9 (ref 7–25)
CALCIUM SERPL-MCNC: 10.1 MG/DL (ref 8.6–10.5)
CHLORIDE SERPL-SCNC: 99 MMOL/L (ref 98–107)
CHOLEST SERPL-MCNC: 162 MG/DL (ref 0–200)
CO2 SERPL-SCNC: 31 MMOL/L (ref 22–29)
CREAT SERPL-MCNC: 1.01 MG/DL (ref 0.76–1.27)
EGFRCR SERPLBLD CKD-EPI 2021: 76.6 ML/MIN/1.73
EOSINOPHIL # BLD AUTO: 0.11 10*3/MM3 (ref 0–0.4)
EOSINOPHIL NFR BLD AUTO: 1.2 % (ref 0.3–6.2)
ERYTHROCYTE [DISTWIDTH] IN BLOOD BY AUTOMATED COUNT: 12.4 % (ref 12.3–15.4)
FOLATE SERPL-MCNC: >20 NG/ML (ref 4.78–24.2)
GLOBULIN SER CALC-MCNC: 2.4 GM/DL
GLUCOSE SERPL-MCNC: 90 MG/DL (ref 65–99)
HCT VFR BLD AUTO: 47.1 % (ref 37.5–51)
HDLC SERPL-MCNC: 50 MG/DL (ref 40–60)
HGB BLD-MCNC: 16 G/DL (ref 13–17.7)
IMM GRANULOCYTES # BLD AUTO: 0.03 10*3/MM3 (ref 0–0.05)
IMM GRANULOCYTES NFR BLD AUTO: 0.3 % (ref 0–0.5)
LDLC SERPL CALC-MCNC: 91 MG/DL (ref 0–100)
LYMPHOCYTES # BLD AUTO: 2.51 10*3/MM3 (ref 0.7–3.1)
LYMPHOCYTES NFR BLD AUTO: 28.1 % (ref 19.6–45.3)
MCH RBC QN AUTO: 32.9 PG (ref 26.6–33)
MCHC RBC AUTO-ENTMCNC: 34 G/DL (ref 31.5–35.7)
MCV RBC AUTO: 96.7 FL (ref 79–97)
MONOCYTES # BLD AUTO: 0.81 10*3/MM3 (ref 0.1–0.9)
MONOCYTES NFR BLD AUTO: 9.1 % (ref 5–12)
NEUTROPHILS # BLD AUTO: 5.45 10*3/MM3 (ref 1.7–7)
NEUTROPHILS NFR BLD AUTO: 61 % (ref 42.7–76)
NRBC BLD AUTO-RTO: 0 /100 WBC (ref 0–0.2)
PLATELET # BLD AUTO: 242 10*3/MM3 (ref 140–450)
POTASSIUM SERPL-SCNC: 5.1 MMOL/L (ref 3.5–5.2)
PROT SERPL-MCNC: 7.1 G/DL (ref 6–8.5)
RBC # BLD AUTO: 4.87 10*6/MM3 (ref 4.14–5.8)
SODIUM SERPL-SCNC: 141 MMOL/L (ref 136–145)
TRIGL SERPL-MCNC: 117 MG/DL (ref 0–150)
TSH SERPL DL<=0.005 MIU/L-ACNC: 1.49 UIU/ML (ref 0.27–4.2)
VIT B12 SERPL-MCNC: 721 PG/ML (ref 211–946)
VLDLC SERPL CALC-MCNC: 21 MG/DL (ref 5–40)
WBC # BLD AUTO: 8.94 10*3/MM3 (ref 3.4–10.8)

## 2025-02-17 ENCOUNTER — TELEPHONE (OUTPATIENT)
Dept: FAMILY MEDICINE CLINIC | Facility: CLINIC | Age: 80
End: 2025-02-17
Payer: MEDICARE

## 2025-02-17 NOTE — PROGRESS NOTES
Please call result -kidney function is doing much better.  Cholesterol is down.  Overall labs look really good.    Electronically signed by EVANGELIST Coates, 02/17/25, 7:03 AM CST.

## 2025-03-14 ENCOUNTER — OFFICE VISIT (OUTPATIENT)
Dept: FAMILY MEDICINE CLINIC | Facility: CLINIC | Age: 80
End: 2025-03-14
Payer: MEDICARE

## 2025-03-14 VITALS
OXYGEN SATURATION: 99 % | WEIGHT: 144 LBS | SYSTOLIC BLOOD PRESSURE: 108 MMHG | HEART RATE: 73 BPM | BODY MASS INDEX: 22.55 KG/M2 | DIASTOLIC BLOOD PRESSURE: 62 MMHG | TEMPERATURE: 98.2 F

## 2025-03-14 DIAGNOSIS — F90.0 ATTENTION DEFICIT HYPERACTIVITY DISORDER (ADHD), PREDOMINANTLY INATTENTIVE TYPE: Primary | ICD-10-CM

## 2025-03-14 DIAGNOSIS — Z51.81 ENCOUNTER FOR THERAPEUTIC DRUG LEVEL MONITORING: ICD-10-CM

## 2025-03-14 RX ORDER — DEXTROAMPHETAMINE SACCHARATE, AMPHETAMINE ASPARTATE MONOHYDRATE, DEXTROAMPHETAMINE SULFATE AND AMPHETAMINE SULFATE 5; 5; 5; 5 MG/1; MG/1; MG/1; MG/1
20 CAPSULE, EXTENDED RELEASE ORAL EVERY MORNING
Qty: 30 CAPSULE | Refills: 0 | Status: SHIPPED | OUTPATIENT
Start: 2025-03-14

## 2025-03-14 NOTE — PROGRESS NOTES
Subjective   Chief Complaint:  Reevaluation of ADHD    History of Present Illness  The patient is a 79-year-old male presenting for a reevaluation of ADHD.    He has been on Adderall 10 mg for the past month, which he reports as effective, although with slightly diminished potency. He has previously been on a regimen of up to 30 mg of Adderall.    MEDICATIONS  Current: Adderall 10 mg    Past Medical, Surgical, Social, and Family History:  Allergies   Allergen Reactions    Sulfa Antibiotics Rash     Childhood allergy    Beef-Derived Drug Products Hives    Pork-Derived Products Hives    History reviewed. No pertinent past medical history. History reviewed. No pertinent surgical history.   Social History     Socioeconomic History    Marital status:    Tobacco Use    Smoking status: Former     Types: Cigars     Passive exposure: Past    Smokeless tobacco: Never   Vaping Use    Vaping status: Never Used   Substance and Sexual Activity    Alcohol use: Yes    Drug use: Yes     Types: Marijuana    Sexual activity: Defer    History reviewed. No pertinent family history.    Objective   Vital Signs  /62 (BP Location: Left arm, Patient Position: Sitting)   Pulse 73   Temp 98.2 °F (36.8 °C)   Wt 65.3 kg (144 lb)   SpO2 99%   BMI 22.55 kg/m²    Physical Exam  Constitutional:       General: He is not in acute distress.  Cardiovascular:      Rate and Rhythm: Normal rate and regular rhythm.      Pulses: Normal pulses.      Heart sounds: No murmur heard.     No friction rub. No gallop.   Pulmonary:      Effort: Pulmonary effort is normal. No respiratory distress.      Breath sounds: Normal breath sounds. No wheezing or rhonchi.   Neurological:      Mental Status: He is alert.   Psychiatric:         Behavior: Behavior normal.         Judgment: Judgment normal.       Assessment & Plan   Assessment & Plan  1. Attention Deficit Hyperactivity Disorder (ADHD), predominantly inattentive type.  The dosage of Adderall will  be titrated to 20 mg daily. A urine drug screen (UDS) was conducted today, and he has signed a new drug contract. If he wishes to increase the dosage back to 30 mg between now and the 4-month follow-up, he should inform the office. If he is satisfied with the 20 mg dose, he can request a refill when he has 3 tablets remaining.    Follow-up  The patient will follow up in 4 months.    Follow-up:  The patient will Return for 4 month CS medication management.    Records and Results Reviewed:  I reviewed current medications as given by patient and allergy list.    : [employer] and Dragon Speech Recognition - No recording technology was used in the exam room during encounter.    Electronically signed by EVANGELIST Coates, 03/14/25, 2:50 PM CDT.

## 2025-03-16 LAB
AMPHETAMINES UR QL SCN: NEGATIVE NG/ML
BARBITURATES UR QL SCN: NEGATIVE NG/ML
BENZODIAZ UR QL SCN: NEGATIVE NG/ML
BZE UR QL SCN: NEGATIVE NG/ML
CANNABINOIDS UR QL SCN: NEGATIVE NG/ML
CREAT UR-MCNC: 71.9 MG/DL (ref 20–300)
LABORATORY COMMENT REPORT: NORMAL
METHADONE UR QL SCN: NEGATIVE NG/ML
OPIATES UR QL SCN: NEGATIVE NG/ML
OXYCODONE+OXYMORPHONE UR QL SCN: NEGATIVE NG/ML
PCP UR QL: NEGATIVE NG/ML
PH UR: 7 [PH] (ref 4.5–8.9)
PROPOXYPH UR QL SCN: NEGATIVE NG/ML

## 2025-04-14 DIAGNOSIS — F90.0 ATTENTION DEFICIT HYPERACTIVITY DISORDER (ADHD), PREDOMINANTLY INATTENTIVE TYPE: ICD-10-CM

## 2025-04-14 RX ORDER — DEXTROAMPHETAMINE SACCHARATE, AMPHETAMINE ASPARTATE MONOHYDRATE, DEXTROAMPHETAMINE SULFATE AND AMPHETAMINE SULFATE 5; 5; 5; 5 MG/1; MG/1; MG/1; MG/1
20 CAPSULE, EXTENDED RELEASE ORAL EVERY MORNING
Qty: 30 CAPSULE | Refills: 0 | Status: SHIPPED | OUTPATIENT
Start: 2025-04-14

## 2025-04-14 NOTE — TELEPHONE ENCOUNTER
Caller: Crys Sky    Relationship: Self    Best call back number: [phone]    Requested Prescriptions:   Requested Prescriptions     Pending Prescriptions Disp Refills    amphetamine-dextroamphetamine XR (Adderall XR) 20 MG 24 hr capsule 30 capsule 0     Sig: Take 1 capsule by mouth Every Morning        Pharmacy where request should be sent: [facility] - [address] [phone] [facility] [phone]      Last office visit with prescribing clinician: 3/14/2025   Last telemedicine visit with prescribing clinician: Visit date not found   Next office visit with prescribing clinician: 7/14/2025     ADDITIONAL NOTES: PATIENT IS OUT OF MEDICATION COMPLETELY     Does the patient have less than a 3 day supply:  [] Yes  [x] No    Would you like a call back once the refill request has been completed: [] Yes [x] No       Mitch Knapp Rep   04/14/25 13:24 CDT

## 2025-04-14 NOTE — TELEPHONE ENCOUNTER
Rx Refill Note  Requested Prescriptions     Pending Prescriptions Disp Refills    amphetamine-dextroamphetamine XR (Adderall XR) 20 MG 24 hr capsule 30 capsule 0     Sig: Take 1 capsule by mouth Every Morning      Last office visit with office: 03/14/2025  Next office visit with office: 07/14/2025    UDS: 03/14/2025    DATE OF LAST REFILL: 03/14/2025    Controlled Substance Agreement: up to date    BEHZAD OR GISELEP: wnl         {TIP  Is Refill Pharmacy correct?:  Beatrice Welch MA  04/14/25, 13:43 CDT

## 2025-04-15 RX ORDER — CLOPIDOGREL BISULFATE 75 MG/1
75 TABLET ORAL DAILY
Qty: 90 TABLET | Refills: 1 | Status: SHIPPED | OUTPATIENT
Start: 2025-04-15

## 2025-04-15 RX ORDER — HYDROCHLOROTHIAZIDE 25 MG/1
25 TABLET ORAL DAILY
Qty: 90 TABLET | Refills: 1 | Status: SHIPPED | OUTPATIENT
Start: 2025-04-15

## 2025-04-15 RX ORDER — ATORVASTATIN CALCIUM 80 MG/1
80 TABLET, FILM COATED ORAL NIGHTLY
Qty: 90 TABLET | Refills: 1 | Status: SHIPPED | OUTPATIENT
Start: 2025-04-15

## 2025-05-16 DIAGNOSIS — F90.0 ATTENTION DEFICIT HYPERACTIVITY DISORDER (ADHD), PREDOMINANTLY INATTENTIVE TYPE: ICD-10-CM

## 2025-05-16 RX ORDER — DEXTROAMPHETAMINE SACCHARATE, AMPHETAMINE ASPARTATE MONOHYDRATE, DEXTROAMPHETAMINE SULFATE AND AMPHETAMINE SULFATE 5; 5; 5; 5 MG/1; MG/1; MG/1; MG/1
20 CAPSULE, EXTENDED RELEASE ORAL EVERY MORNING
Qty: 30 CAPSULE | Refills: 0 | Status: SHIPPED | OUTPATIENT
Start: 2025-05-16

## 2025-05-16 NOTE — TELEPHONE ENCOUNTER
Caller: Crys Sky    Relationship: Self    Best call back number: [phone]     Requested Prescriptions:   Requested Prescriptions     Pending Prescriptions Disp Refills    amphetamine-dextroamphetamine XR (Adderall XR) 20 MG 24 hr capsule 30 capsule 0     Sig: Take 1 capsule by mouth Every Morning        Pharmacy where request should be sent: [facility] - [address] [phone] [facility] [phone]      Last office visit with prescribing clinician: 3/14/2025   Last telemedicine visit with prescribing clinician: Visit date not found   Next office visit with prescribing clinician: 7/14/2025     Additional details provided by patient: COMPLETELY OUT    Does the patient have less than a 3 day supply:  [x] Yes  [] No    Would you like a call back once the refill request has been completed: [] Yes [] No    If the office needs to give you a call back, can they leave a voicemail: [] Yes [] No    Mitch Pace Rep   05/16/25 09:52 CDT

## 2025-05-16 NOTE — TELEPHONE ENCOUNTER
Rx Refill Note  Requested Prescriptions     Pending Prescriptions Disp Refills    amphetamine-dextroamphetamine XR (Adderall XR) 20 MG 24 hr capsule 30 capsule 0     Sig: Take 1 capsule by mouth Every Morning      Last office visit with prescribing clinician: 3/14/2025   Last telemedicine visit with prescribing clinician: Visit date not found   Next office visit with prescribing clinician: 7/14/2025     Verena Manning MA  05/16/25, 11:41 CDT

## 2025-06-16 DIAGNOSIS — F90.0 ATTENTION DEFICIT HYPERACTIVITY DISORDER (ADHD), PREDOMINANTLY INATTENTIVE TYPE: ICD-10-CM

## 2025-06-16 RX ORDER — DEXTROAMPHETAMINE SACCHARATE, AMPHETAMINE ASPARTATE MONOHYDRATE, DEXTROAMPHETAMINE SULFATE AND AMPHETAMINE SULFATE 5; 5; 5; 5 MG/1; MG/1; MG/1; MG/1
20 CAPSULE, EXTENDED RELEASE ORAL EVERY MORNING
Qty: 30 CAPSULE | Refills: 0 | Status: SHIPPED | OUTPATIENT
Start: 2025-06-16

## 2025-06-16 NOTE — TELEPHONE ENCOUNTER
Rx Refill Note  Requested Prescriptions     Pending Prescriptions Disp Refills    amphetamine-dextroamphetamine XR (Adderall XR) 20 MG 24 hr capsule 30 capsule 0     Sig: Take 1 capsule by mouth Every Morning      Last office visit with office: 03/14/2025  Next office visit with office: 07/14/2025    UDS: 03/14/2025    DATE OF LAST REFILL: 05/16/2025    Controlled Substance Agreement: up to date    BEHZAD OR GISELEP: wnl         {TIP  Is Refill Pharmacy correct?:  Beatrice Welch MA  06/16/25, 16:04 CDT

## 2025-06-16 NOTE — TELEPHONE ENCOUNTER
Caller: Crys Sky    Relationship: Self    Best call back number: [phone]     Requested Prescriptions:   Requested Prescriptions     Pending Prescriptions Disp Refills    amphetamine-dextroamphetamine XR (Adderall XR) 20 MG 24 hr capsule 30 capsule 0     Sig: Take 1 capsule by mouth Every Morning        Pharmacy where request should be sent: [facility] - [address] [phone] [facility] [phone]      Last office visit with prescribing clinician: 3/14/2025   Last telemedicine visit with prescribing clinician: Visit date not found   Next office visit with prescribing clinician: 7/14/2025     Additional details provided by patient: 2 DAYS LEFT    Does the patient have less than a 3 day supply:  [x] Yes  [] No      Mitch Nguyễn Rep   06/16/25 15:05 CDT         DELETE AFTER READING TO PATIENT: “Thank you for sharing this information with me. I will send a message to the clinical team. Please allow 48 hours for the clinical staff to follow up on this request.”

## 2025-07-14 ENCOUNTER — OFFICE VISIT (OUTPATIENT)
Dept: FAMILY MEDICINE CLINIC | Facility: CLINIC | Age: 80
End: 2025-07-14
Payer: MEDICARE

## 2025-07-14 VITALS
DIASTOLIC BLOOD PRESSURE: 82 MMHG | OXYGEN SATURATION: 94 % | HEART RATE: 91 BPM | SYSTOLIC BLOOD PRESSURE: 128 MMHG | WEIGHT: 144.8 LBS | TEMPERATURE: 96.3 F | HEIGHT: 67 IN | BODY MASS INDEX: 22.73 KG/M2

## 2025-07-14 DIAGNOSIS — E78.5 HYPERLIPIDEMIA, UNSPECIFIED HYPERLIPIDEMIA TYPE: ICD-10-CM

## 2025-07-14 DIAGNOSIS — F90.0 ATTENTION DEFICIT HYPERACTIVITY DISORDER (ADHD), PREDOMINANTLY INATTENTIVE TYPE: ICD-10-CM

## 2025-07-14 DIAGNOSIS — Z00.00 MEDICARE ANNUAL WELLNESS VISIT, SUBSEQUENT: ICD-10-CM

## 2025-07-14 DIAGNOSIS — I10 PRIMARY HYPERTENSION: Primary | ICD-10-CM

## 2025-07-14 PROCEDURE — 1126F AMNT PAIN NOTED NONE PRSNT: CPT | Performed by: NURSE PRACTITIONER

## 2025-07-14 PROCEDURE — 3074F SYST BP LT 130 MM HG: CPT | Performed by: NURSE PRACTITIONER

## 2025-07-14 PROCEDURE — G0439 PPPS, SUBSEQ VISIT: HCPCS | Performed by: NURSE PRACTITIONER

## 2025-07-14 PROCEDURE — 1170F FXNL STATUS ASSESSED: CPT | Performed by: NURSE PRACTITIONER

## 2025-07-14 PROCEDURE — 99214 OFFICE O/P EST MOD 30 MIN: CPT | Performed by: NURSE PRACTITIONER

## 2025-07-14 PROCEDURE — G2211 COMPLEX E/M VISIT ADD ON: HCPCS | Performed by: NURSE PRACTITIONER

## 2025-07-14 PROCEDURE — 3079F DIAST BP 80-89 MM HG: CPT | Performed by: NURSE PRACTITIONER

## 2025-07-14 PROCEDURE — 1159F MED LIST DOCD IN RCRD: CPT | Performed by: NURSE PRACTITIONER

## 2025-07-14 PROCEDURE — 1160F RVW MEDS BY RX/DR IN RCRD: CPT | Performed by: NURSE PRACTITIONER

## 2025-07-14 RX ORDER — DEXTROAMPHETAMINE SACCHARATE, AMPHETAMINE ASPARTATE MONOHYDRATE, DEXTROAMPHETAMINE SULFATE AND AMPHETAMINE SULFATE 5; 5; 5; 5 MG/1; MG/1; MG/1; MG/1
20 CAPSULE, EXTENDED RELEASE ORAL EVERY MORNING
Qty: 30 CAPSULE | Refills: 0 | Status: SHIPPED | OUTPATIENT
Start: 2025-07-14

## 2025-07-14 NOTE — PROGRESS NOTES
Subjective   The ABCs of the Annual Wellness Visit  Medicare Wellness Visit      Crys Sky is a 79 y.o. patient who presents for a Medicare Wellness Visit.    The following portions of the patient's history were reviewed and   updated as appropriate: allergies, current medications, past family history, past medical history, past social history, past surgical history, and problem list.    Compared to one year ago, the patient's physical   health is the same.  Compared to one year ago, the patient's mental   health is the same.    Recent Hospitalizations:  He was not admitted to the hospital during the last year.     Current Medical Providers:  Patient Care Team:  Hector Marquez APRN as PCP - General (Nurse Practitioner)    Outpatient Medications Prior to Visit   Medication Sig Dispense Refill    aspirin 81 MG EC tablet Take 1 tablet by mouth.      atorvastatin (LIPITOR) 80 MG tablet TAKE ONE TABLET EVERY NIGHT 90 tablet 1    clopidogrel (PLAVIX) 75 MG tablet TAKE ONE TABLET  DAILY 90 tablet 1    hydroCHLOROthiazide 25 MG tablet TAKE ONE TABLET  DAILY 90 tablet 1    lisinopril (PRINIVIL,ZESTRIL) 40 MG tablet TAKE ONE TABLET  DAILY 90 tablet 1    multivitamin with minerals tablet tablet Take 1 tablet by mouth.      PARoxetine (PAXIL) 20 MG tablet TAKE ONE TABLET  EVERY MORNING 90 tablet 1    amphetamine-dextroamphetamine XR (Adderall XR) 20 MG 24 hr capsule Take 1 capsule by mouth Every Morning 30 capsule 0    gabapentin (NEURONTIN) 100 MG capsule Take 1 capsule by mouth 3 (Three) Times a Day. (Patient not taking: Reported on 7/14/2025) 90 capsule 0     No facility-administered medications prior to visit.     No opioid medication identified on active medication list. I have reviewed chart for other potential  high risk medication/s and harmful drug interactions in the elderly.      Aspirin is on active medication list. Aspirin use is indicated based on review of current medical condition/s. Pros and cons of this  "therapy have been discussed today. Benefits of this medication outweigh potential harm.  Patient has been encouraged to continue taking this medication.  .      Patient Active Problem List   Diagnosis    Primary hypertension    Rash    Cerebrovascular accident (CVA)    Tobacco use     Advance Care Planning Advance Directive is not on file.  ACP discussion was declined by the patient. Patient does not have an advance directive, declines further assistance.            Objective   Vitals:    25 1411   BP: 128/82   Pulse: 91   Temp: 96.3 °F (35.7 °C)   TempSrc: Infrared   SpO2: 94%   Weight: 65.7 kg (144 lb 12.8 oz)   Height: 170.2 cm (67.01\")   PainSc: 0-No pain       Estimated body mass index is 22.67 kg/m² as calculated from the following:    Height as of this encounter: 170.2 cm (67.01\").    Weight as of this encounter: 65.7 kg (144 lb 12.8 oz).    BMI is within normal parameters. No other follow-up for BMI required.           Does the patient have evidence of cognitive impairment? No                                                                                                Health  Risk Assessment    Smoking Status:  Social History     Tobacco Use   Smoking Status Former    Types: Cigars    Passive exposure: Past   Smokeless Tobacco Never     Alcohol Consumption:  Social History     Substance and Sexual Activity   Alcohol Use Yes       Fall Risk Screen  STEADI Fall Risk Assessment was completed, and patient is at LOW risk for falls.Assessment completed on:2025    Depression Screening   Little interest or pleasure in doing things? Not at all   Feeling down, depressed, or hopeless? Not at all   PHQ-2 Total Score 0      Health Habits and Functional and Cognitive Screenin/14/2025     2:13 PM   Functional & Cognitive Status   Do you have difficulty preparing food and eating? No   Do you have difficulty bathing yourself, getting dressed or grooming yourself? No   Do you have difficulty using the " toilet? No   Do you have difficulty moving around from place to place? No   Do you have trouble with steps or getting out of a bed or a chair? Yes   Current Diet Well Balanced Diet   Dental Exam Up to date   Eye Exam Up to date   Exercise (times per week) 7 times per week   Current Exercises Include Yard Work   Do you need help using the phone?  No   Are you deaf or do you have serious difficulty hearing?  No   Do you need help to go to places out of walking distance? No   Do you need help shopping? No   Do you need help preparing meals?  No   Do you need help with housework?  No   Do you need help with laundry? No   Do you need help taking your medications? No   Do you need help managing money? No   Do you ever drive or ride in a car without wearing a seat belt? Yes   Have you felt unusual fatigue (could be tiredness), stress, anger or loneliness in the last month? No   Who do you live with? Alone   If you need help, do you have trouble finding someone available to you? No   Have you been bothered in the last four weeks by sexual problems? No   Do you have difficulty concentrating, remembering or making decisions? No           Age-appropriate Screening Schedule:  Refer to the list below for future screening recommendations based on patient's age, sex and/or medical conditions. Orders for these recommended tests are listed in the plan section. The patient has been provided with a written plan.    Health Maintenance List  Health Maintenance   Topic Date Due    TDAP/TD VACCINES (1 - Tdap) Never done    Pneumococcal Vaccine 50+ (1 of 1 - PCV) Never done    ZOSTER VACCINE (1 of 2) Never done    HEPATITIS C SCREENING  Never done    RSV Vaccine - Adults (1 - 1-dose 75+ series) Never done    COVID-19 Vaccine (3 - 2024-25 season) 09/01/2024    INFLUENZA VACCINE  10/01/2025    LIPID PANEL  02/14/2026    ANNUAL WELLNESS VISIT  07/14/2026    COLORECTAL CANCER SCREENING  Discontinued                                               "                                                                                                  CMS Preventative Services Quick Reference  Risk Factors Identified During Encounter  Immunizations Discussed/Encouraged: Tdap, Shingrix, RSV (Respiratory Syncytial Virus), and offered information on Tdap, Shingrix, RSV-declines today  Regular chronic care visit also completed today.    The above risks/problems have been discussed with the patient.  Pertinent information has been shared with the patient in the After Visit Summary.  An After Visit Summary and PPPS were made available to the patient.    Follow Up:   Next Medicare Wellness visit to be scheduled in 1 year.         Additional E&M Note during same encounter follows:  Patient has additional, significant, and separately identifiable condition(s)/problem(s) that require work above and beyond the Medicare Wellness Visit     Chief Complaint  Medicare Wellness-subsequent-medication management-ADHD    Subjective    HPI  Crys is also being seen today for an annual adult preventative physical exam.        The patient is a 79-year-old male presenting for a regular medication management visit for Adderall. He has a history of ADHD and is on Adderall XR 20 mg daily. His contract and drug screen are up to date. He also has a history of high blood pressure and high cholesterol. His labs are all up to date. He continues lisinopril 40 mg daily and HCTZ 25 mg daily. For cholesterol, he is on atorvastatin 80 mg daily.          Objective   Vital Signs:  /82   Pulse 91   Temp 96.3 °F (35.7 °C) (Infrared)   Ht 170.2 cm (67.01\")   Wt 65.7 kg (144 lb 12.8 oz)   SpO2 94%   BMI 22.67 kg/m²   Physical Exam  Constitutional:       General: He is not in acute distress.  Neck:      Vascular: No carotid bruit.   Cardiovascular:      Rate and Rhythm: Normal rate and regular rhythm.      Pulses: Normal pulses.           Dorsalis pedis pulses are 2+ on the right side and 2+ on the " left side.        Posterior tibial pulses are 2+ on the right side and 2+ on the left side.      Heart sounds: No murmur heard.     No friction rub. No gallop.   Pulmonary:      Effort: Pulmonary effort is normal. No respiratory distress.      Breath sounds: Normal breath sounds. No wheezing or rhonchi.   Musculoskeletal:      Right lower leg: No edema.      Left lower leg: No edema.   Neurological:      Mental Status: He is alert.           Vital Signs  Blood pressure is satisfactory.            Results             Assessment and Plan        1. ADHD, predominantly inattentive, chronic, stable.  He will continue with the current regimen of Adderall XR 20 mg daily.    2. Primary hypertension, chronic, stable.  He will continue with the current regimen of lisinopril 40 mg daily and HCTZ 25 mg daily.    3. Hyperlipidemia, unspecified.  He will continue with the current regimen of atorvastatin 80 mg nightly.         Follow Up   Return for 4 month CS medication management.  Patient was given instructions and counseling regarding his condition or for health maintenance advice. Please see specific information pulled into the AVS if appropriate.  DEXA not used in room-no consent needed

## (undated) DEVICE — STOPCOCK IV PRIMING 0.26ML 3 W W/ TWO FEM LUERLOK PRT 1

## (undated) DEVICE — LARYNGOSCOPE BLDE MAC HNDL M SZ 35 ST CURAPLEX CURAVIEW LED

## (undated) DEVICE — DRESSING FOAM SELF ADH 20X10 CM ABSORBENT MEPILEX BORDER

## (undated) DEVICE — C-ARM: Brand: UNBRANDED

## (undated) DEVICE — SUTURE VCRL SZ 3-0 L27IN ABSRB UD L26MM SH 1/2 CIR J416H

## (undated) DEVICE — SUTURE VCRL SZ 3-0 L18IN ABSRB UD W/O NDL POLYGLACTIN 910 J110T

## (undated) DEVICE — Device: Brand: JELCO

## (undated) DEVICE — SYRINGE MED 10ML LUERLOCK TIP W/O SFTY DISP

## (undated) DEVICE — SOLUTION IV 100ML 0.9% SOD CHL PLAS CONT USP VIAFLX 1 PER

## (undated) DEVICE — SOLUTION IV 1000ML 0.9% SOD CHL PH 5 INJ USP VIAFLX PLAS

## (undated) DEVICE — DRESSING FOAM W10XL10CM ABSRB ANTIMIC SAFETAC TECHNOLOGY

## (undated) DEVICE — SUTURE MCRYL SZ 4-0 L18IN ABSRB UD L19MM PS-2 3/8 CIR PRIM Y496G

## (undated) DEVICE — STERILE POLYISOPRENE POWDER-FREE SURGICAL GLOVES: Brand: PROTEXIS

## (undated) DEVICE — AGENT HEMSTAT 8ML FLX TIP MTRX + DISP SURGIFLO

## (undated) DEVICE — INFLATION DEVICE: Brand: ENCORE™ 26

## (undated) DEVICE — SURGICAL PROCEDURE PACK VASC LOURDES HOSP

## (undated) DEVICE — SUTURE PROL SZ 7-0 L24IN NONABSORBABLE BLU L9.3MM BV-1 3/8 M8702

## (undated) DEVICE — SET EXTN TBNG IV STD BOR 30IN NO STPCOCK

## (undated) DEVICE — STAPLER SKIN L39MM DIA0.53MM CRWN 5.7MM S STL FIX HD PROX

## (undated) DEVICE — NEEDLE HYPO 18GA L1.5IN PNK POLYPR HUB S STL REG BVL STR

## (undated) DEVICE — CLIP LIG M BLU TI HRT SHP WIRE HORZ 180 PER BX

## (undated) DEVICE — SUTURE PROL SZ 6-0 L24IN NONABSORBABLE BLU L9.3MM BV-1 3/8 8805H

## (undated) DEVICE — SUTURE NONABSORBABLE MONOFILAMENT 7-0 BV-1 1X24 IN PROLENE 8702H

## (undated) DEVICE — CANNULA PERF L1.3IN TIP L2MM S STL POLYUR TB ARTOTMY BLB

## (undated) DEVICE — SHUNT CV SIL EXT LOOP L30CM DIA3X4MM FULL SPR REINF SUNDT

## (undated) DEVICE — SUTURE VCRL SZ 4-0 L18IN ABSRB UD VCRL POLYGLACTIN 910 COAT J109T

## (undated) DEVICE — SUTURE VCRL SZ 2-0 L36IN ABSRB UD L36MM CT-1 1/2 CIR J945H

## (undated) DEVICE — PINNACLE INTRODUCER SHEATH: Brand: PINNACLE

## (undated) DEVICE — MEDIA CONTRAST INJ VISIPAQUE 150ML 320MG

## (undated) DEVICE — 3M™ STERI-DRAPE™ INSTRUMENT POUCH 1018: Brand: STERI-DRAPE™

## (undated) DEVICE — 3M™ IOBAN™ 2 ANTIMICROBIAL INCISE DRAPE 6650EZ: Brand: IOBAN™ 2

## (undated) DEVICE — NAVICROSS SUPPORT CATHETER: Brand: NAVICROSS

## (undated) DEVICE — GLOVE SURG SZ 7 L12IN FNGR THK79MIL GRN LTX FREE

## (undated) DEVICE — PTA BALLOON DILATATION CATHETER: Brand: CHARGER™

## (undated) DEVICE — SUTURE PROL SZ 6-0 L30IN NONABSORBABLE BLU L9.3MM BV-1 3/8 M8709

## (undated) DEVICE — SUTURE PERMA-HAND SZ 2-0 L30IN NONABSORBABLE BLK L26MM SH K833H

## (undated) DEVICE — JACKSON-PRATT 100CC BULB RESERVOIR: Brand: CARDINAL HEALTH

## (undated) DEVICE — SUTURE PERMAHAND SZ 2-0 L18IN NONABSORBABLE BLK L26MM FS 685G

## (undated) DEVICE — ANGIOGRAPHY PK

## (undated) DEVICE — SYRINGE MED 50ML LUERLOCK TIP

## (undated) DEVICE — MEDIA CONTRAST INJ VISAPAQUE 50ML 320MG

## (undated) DEVICE — SOLUTION IV 500ML 0.9% SOD CHL PH 5 INJ USP VIAFLX PLAS

## (undated) DEVICE — CLIP INT SM WIDE RED TI TRNSVRS GRV CHEVRON SHP W/ PRECIS

## (undated) DEVICE — SOLUTION IV IRRIG POUR BRL 0.9% SODIUM CHL 2F7124

## (undated) DEVICE — SUTURE VCRL CTRL REL 2-0 CTX 18IN ABSRB BRAID UD J723D

## (undated) DEVICE — MOLLRING CUTTER TRANSECTION DEVICE, 8MM: Brand: MOLLRING CUTTER TRANSECTION DEVICE

## (undated) DEVICE — TOWEL,OR,DSP,ST,BLUE,DLX,4/PK,20PK/CS: Brand: MEDLINE

## (undated) DEVICE — GOWN,PREVENTION PLUS,XL,ST,24/CS: Brand: MEDLINE

## (undated) DEVICE — BLANKET WRM W40.2XL55.9IN IORT LO BODY + MISTRAL AIR

## (undated) DEVICE — CATHETER ANGIO AD 5FR L65CM DIA0.049IN GWIRE 0.035IN SHEP

## (undated) DEVICE — TOTAL TRAY, 16FR 10ML SIL FOLEY, URN: Brand: MEDLINE

## (undated) DEVICE — RADIFOCUS GLIDEWIRE: Brand: GLIDEWIRE

## (undated) DEVICE — SOLUTION IRRIG 1000ML 09% SOD CHL USP PIC PLAS CONTAINER

## (undated) DEVICE — SUTURE VCRL SZ 2-0 L18IN ABSRB UD POLYGLACTIN 910 BRAID TIE J111T

## (undated) DEVICE — DRESSING FOAM W4XL4IN SIL FACE BORD ADH PD SUP ABSRB COR

## (undated) DEVICE — Device

## (undated) DEVICE — GLIDESHEATH BASIC HYDROPHILIC COATED INTRODUCER SHEATH: Brand: GLIDESHEATH

## (undated) DEVICE — TUBE ET 7.5MM NSL ORAL BASIC CUF INTMED MURPHY EYE RADPQ

## (undated) DEVICE — LOOP VES W1.3MM THK0.9MM MINI WHT SIL FLD REPELLENT

## (undated) DEVICE — TUBE ET 8MM NSL ORAL BASIC CUF INTMED MURPHY EYE RADPQ MRK

## (undated) DEVICE — SUTURE NONABSORBABLE MONOFILAMENT 4-0 RB-1 36 IN BLU PROLENE 8557H

## (undated) DEVICE — COVER LT HNDL PLAS RIG 2 PER PK

## (undated) DEVICE — DRAIN WND SIL FLAT RADIOPAQUE 10MM FULL FLUTED

## (undated) DEVICE — SUTURE ABSORBABLE MONOFILAMENT 4-0 PS1 12 IN UD MONOCRYL + SXMP1B114

## (undated) DEVICE — SUTURE 3-0 PDS 18 PLUS

## (undated) DEVICE — Z DISCONTINUED USE 2272117 DRAPE SURG 3 QTR N INVASIVE 2 LAYR DISP

## (undated) DEVICE — CONVERTORS STOCKINETTE: Brand: CONVERTORS

## (undated) DEVICE — DRESSING TRNSPAR W5XL4.5IN FLM SHT SEMIPERMEABLE WIND

## (undated) DEVICE — DEVICE TORQ DIA0.018-0.038IN GRN ERGO 1 HND FOR PTFE GWIRE

## (undated) DEVICE — SPONGE LAP W18XL18IN WHT COT 4 PLY FLD STRUNG RADPQ DISP ST

## (undated) DEVICE — GLOVE SURG SZ 75 L12IN FNGR THK94MIL TRNSLUC YEL LTX

## (undated) DEVICE — ADHESIVE SKIN CLSR 0.7ML TOP DERMBND ADV

## (undated) DEVICE — PROVE COVER: Brand: UNBRANDED

## (undated) DEVICE — SOLUTION INJ VISIPAQUE 50ML

## (undated) DEVICE — SUTURE PROLENE 7-0 BV-1 BL MONO 30L  (4P

## (undated) DEVICE — SUTURE VCRL SZ 3-0 L18IN ABSRB UD L26MM SH 1/2 CIR J864D

## (undated) DEVICE — 3M™ IOBAN™ 2 ANTIMICROBIAL INCISE DRAPE 6651EZ: Brand: IOBAN™ 2

## (undated) DEVICE — AMBU AURA-I U SIZE 4, DISPOSABLE LARYNGEAL MASK: Brand: AURA-I

## (undated) DEVICE — SYRINGE MED 30ML STD CLR PLAS LUERLOCK TIP N CTRL DISP

## (undated) DEVICE — DESTINATION RENAL GUIDING SHEATH: Brand: DESTINATION

## (undated) DEVICE — RADIFOCUS GLIDECATH: Brand: GLIDECATH

## (undated) DEVICE — SUTURE ABSORBABLE MONOFILAMENT 3-0 SH 27 IN UD PDS + PDP416H

## (undated) DEVICE — GLOVE SURG SZ 7 L12IN FNGR THK94MIL TRNSLUC YEL LTX HYDRGEL

## (undated) DEVICE — CATHETER KIT 5 FR 21 GAX7 CM MICROINTRODUCER GUIDEWIRE STIFF

## (undated) DEVICE — SUTURE 3-0 L18IN NONABSORBABLE SH BLK NDL BRAID SILK M13T